# Patient Record
Sex: MALE | Race: WHITE | NOT HISPANIC OR LATINO | Employment: OTHER | URBAN - METROPOLITAN AREA
[De-identification: names, ages, dates, MRNs, and addresses within clinical notes are randomized per-mention and may not be internally consistent; named-entity substitution may affect disease eponyms.]

---

## 2017-04-15 ENCOUNTER — TELEPHONE (OUTPATIENT)
Dept: NURSING | Facility: CLINIC | Age: 82
End: 2017-04-15

## 2017-04-15 NOTE — TELEPHONE ENCOUNTER
Call Type: Triage Call    Presenting Problem: Caller states that he  has arecurrent rash on his  lower legs and arms that been recurring for several years during the  winter but at the present time it is worse than ever appearing on  both calfs and his arms;described as dry itchy scaly and red; seems  to sprread slowly; rash usually goe away in the summertime but this  time he is concerned;  Denies signs of infection or drainage from  rash.  Advised to be seen in clinic in 72 hrs and transferred to  .  Triage Note:  Guideline Title: Rash  Recommended Disposition: See Provider within 72 Hours  Original Inclination:  Override Disposition:  Intended Action:  Physician Contacted: No  Rash lasting more than 2 weeks and not previously evaluated ?  YES  Rash with target-like appearance ? NO  Pregnant AND possible exposure to rubella ? NO  Severe breathing problems ? NO  Breathing problems ? NO  Known or suspected exposure to Poison [place], [place] OR [place] ? NO  Multiple red, fluid-filled lesions AND symptoms of upper respiratory infection  (URI) ? NO  New or worsening signs and symptoms that may indicate shock ? NO  Previously confirmed diagnosis of athlete's foot and similar symptoms ? NO  Reddened, raw skin caused by or made worse by scratching ? NO  Known herpes zoster or undiagnosed blister-like rash on or near eye or on tip of  nose ? NO  Evaluated by provider AND symptoms worsening when following recommended treatment  plan ? NO  New rash/eruptions AND any temperature elevation in an immunocompromised  individual or frail elderly ? NO  Mild to moderate itching AND not responding to home care ? NO  Itching, stinging or burning with small blisters or cracking of skin appearing as  moist lesions between toes; may also involve soles or sides of feet ? NO  Painful cold sore-like blister on tip of finger ? NO  Signs/symptoms of anaphylaxis ? NO  Possible exposure to chickenpox or has rash that looks like chickenpox ?  NO  Burning or tingling feeling, itchiness or stabbing pain in a localized area on one  side of body followed by reddened fluid-filled blisters that break and crust ? NO  New onset of joint swelling or pain ? NO  Pregnant and rash ? NO  Traveled out of country in past 2 months ? NO  Known insect bite or sting ? NO  Small blisters on soles of feet or palm of hands, or along edges of the toes or  fingers causing intense itching ? NO  Skin changes that looks like hives (itchy welts or wheals) ? NO  Severe or persistent itching that interferes with ability to carry out usual  activities or sleep ? NO  Persistence of symptoms (bumps, itching, blisters, ulceration, scaling, etc.)  after completion of prescribed treatment OR not responding to home care ? NO  Itchy, burning, red pinpoint, patchy rash occurring within 48 hours of swimming in  a lake or pond ? NO  Jaundice (yellowish tint to skin or whites of eyes) that is worsening or not  previously evaluated. ? NO  History of severe reaction after previous similar exposure to known allergen ? NO  First occurrence of mild allergic reaction (rash; hives; itching; nasal  congestion; watery, red eyes) that occurs within minutes to several hours after  exposure to an allergen AND without any other signs or symptoms of anaphylaxis. ?  NO  Generalized rash AND not responding to 8 hours of home care ? NO  New onset of rash after beginning new prescribed, nonprescribed, or  alternative/complementary medication within last 10 days ? NO  Unexplained blood-colored (purple or red) flat pinpoint dots, spots or patches on  the skin ? NO  Foot or toe signs or symptoms complicated by diagnosed diabetes mellitus. ? NO  Any new OR worsening signs and symptoms of soft tissue infection ? NO  Physician Instructions:  Care Advice: Do not scratch or rub lesions to decrease the chance of  secondary infection.  See provider within 4 hours if having signs and symptoms of local infection  such as  redness or red streaks, tenderness, warmth, swelling, or purulent  drainage).  Call provider immediately if symptoms worsen before seeing provider.  SYMPTOM / CONDITION MANAGEMENT

## 2017-04-17 ENCOUNTER — OFFICE VISIT (OUTPATIENT)
Dept: FAMILY MEDICINE | Facility: CLINIC | Age: 82
End: 2017-04-17
Payer: COMMERCIAL

## 2017-04-17 VITALS
HEIGHT: 72 IN | TEMPERATURE: 97.7 F | HEART RATE: 55 BPM | WEIGHT: 165.6 LBS | BODY MASS INDEX: 22.43 KG/M2 | OXYGEN SATURATION: 99 % | SYSTOLIC BLOOD PRESSURE: 142 MMHG | DIASTOLIC BLOOD PRESSURE: 70 MMHG

## 2017-04-17 DIAGNOSIS — L20.9 ATOPIC DERMATITIS, UNSPECIFIED TYPE: Primary | ICD-10-CM

## 2017-04-17 PROCEDURE — 99213 OFFICE O/P EST LOW 20 MIN: CPT | Performed by: NURSE PRACTITIONER

## 2017-04-17 RX ORDER — DIAPER,BRIEF,INFANT-TODD,DISP
EACH MISCELLANEOUS 2 TIMES DAILY
COMMUNITY
End: 2017-04-17

## 2017-04-17 RX ORDER — BETAMETHASONE DIPROPIONATE 0.5 MG/G
CREAM TOPICAL
Qty: 50 G | Refills: 2 | Status: SHIPPED | OUTPATIENT
Start: 2017-04-17 | End: 2017-11-09

## 2017-04-17 RX ORDER — TRIAMCINOLONE ACETONIDE 1 MG/G
CREAM TOPICAL
Status: CANCELLED | OUTPATIENT
Start: 2017-04-17

## 2017-04-17 ASSESSMENT — PAIN SCALES - GENERAL: PAINLEVEL: NO PAIN (0)

## 2017-04-17 NOTE — PATIENT INSTRUCTIONS
Try using Dove Sensitive Skin soap.    Make sure your laundry detergent is unscented.    Use over the counter claritin 10 mg daily for itching.    Apply diprolene cream twice daily.  Once dry, use an unscented lotion such as Aveeno, Eucerin, or Lubriderm over the area twice daily.    Schedule an appointment with dermatology if not improving in 2 weeks.      [facility]    If you have any questions regarding to your visit please contact your care team:     Team Pink:   Clinic Hours Telephone Number   Internal Medicine:  Dr. Priyanka Aragon, NP       7am-7pm  Monday - Thursday   7am-5pm  Fridays  [phone]  (Appointment scheduling available 24/7)    Questions about your visit?  Team Line  [phone]   Urgent Care - [place] and [facility] - 11am-9pm Monday-Friday Saturday-Sunday- 9am-5pm   [place] - 5pm-9pm Monday-Friday Saturday-Sunday- 9am-5pm  [phone] - [facility]  [phone] - [place]       What options do I have for visits at the clinic other than the traditional office visit?  To expand how we care for you, many of our providers are utilizing electronic visits (e-visits) and telephone visits, when medically appropriate, for interactions with their patients rather than a visit in the clinic.   We also offer nurse visits for many medical concerns. Just like any other service, we will bill your insurance company for this type of visit based on time spent on the phone with your provider. Not all insurance companies cover these visits. Please check with your medical insurance if this type of visit is covered. You will be responsible for any charges that are not paid by your insurance.      E-visits via [employer]:  generally incur a $35.00 fee.  Telephone visits:  Time spent on the phone: *charged based on time that is spent on the phone in increments of 10 minutes. Estimated cost:   5-10 mins $30.00   11-20 mins. $59.00   21-30 mins. $85.00    Use "[employer] (secure email communication and access to your chart) to send your primary care provider a message or make an appointment. Ask someone on your Team how to sign up for "[employer].    For a Price Quote for your services, please call our Consumer Price Line at [phone].    As always, Thank you for trusting us with your health care needs!    Earnestine Jimenez, CMA

## 2017-04-17 NOTE — PROGRESS NOTES
SUBJECTIVE:                                                    Parish Driscoll is a 89 year old male who presents to clinic today for the following health issues:      Rash     Onset: ongoing for years    Description:   Location: Bilateral arms and legs  Character: round, raised, burning, red  Itching (Pruritis): YES    Progression of Symptoms:  worsening    Accompanying Signs & Symptoms:  Fever: no   Body aches or joint pain: no   Sore throat symptoms: no   Recent cold symptoms: no    History:   Previous similar rash: YES    Precipitating factors:   Exposure to similar rash: no   New exposures: None   Recent travel: no     Alleviating factors:  Creams     Therapies Tried and outcome: Creams- helps somewhat for itching but doesn't clear up the rash.    Patient has tried hydrocortisone, gold bond anti-itch and triamcinolone recently.  He denies any systemic symptoms.  He does admit to frequent pruritis and itches rash frequently.    Problem list and histories reviewed & adjusted, as indicated.  Additional history: as documented    Patient Active Problem List   Diagnosis     Hypothyroidism     Advanced directives, counseling/discussion     Hypertension goal BP (blood pressure) < 140/90     Hyperlipidemia with target LDL less than 130     Prostate cancer (H)     Hyponatremia     Edema     Right thigh pain     Inflammation of joint of right knee     Past Surgical History:   Procedure Laterality Date     CYSTECTOMY BRANCHIAL CLEFT  11/2015     CYSTOSCOPY  06/2003     TONSILLECTOMY      at age 12       Social History   Substance Use Topics     Smoking status: Former Smoker     Years: 15.00     Types: Cigarettes     Quit date: 1/1/1960     Smokeless tobacco: Never Used     Alcohol use No     Family History   Problem Relation Age of Onset     Hypertension Mother      Hypertension Father      Depression Father      Asthma Sister      Depression Sister      DIABETES Maternal Grandmother      CANCER Maternal Grandfather       Stomach     HEART DISEASE No family hx of          Current Outpatient Prescriptions   Medication Sig Dispense Refill     augmented betamethasone dipropionate (DIPROLENE-AF) 0.05 % cream Apply sparingly to affected area twice daily as needed.  Do not apply to face. 50 g 2     lisinopril-hydrochlorothiazide (PRINZIDE,ZESTORETIC) 20-25 MG per tablet TAKE 1 TABLET EVERY DAY 90 tablet 3     levothyroxine (SYNTHROID, LEVOTHROID) 88 MCG tablet TAKE 1 TABLET EVERY DAY 90 tablet 3     metoprolol (LOPRESSOR) 50 MG tablet Take 1 tablet (50 mg) by mouth 2 times daily 180 tablet 3     amLODIPine (NORVASC) 5 MG tablet TAKE 1 TABLET EVERY DAY 90 tablet 3     lisinopril (PRINIVIL,ZESTRIL) 20 MG tablet TAKE 1 TABLET EVERY DAY 90 tablet 3     aspirin 325 MG EC tablet Take 162.5 mg by mouth daily.       Allergies   Allergen Reactions     Mold      Nkda [No Known Drug Allergies]      Seasonal Allergies Other (See Comments)     Runny nose     Recent Labs   Lab Test  11/23/16   1130  11/07/16   1528  08/20/15   1026  02/04/15   1108   09/04/13   1026  09/04/12   1021  06/27/11   1248   05/28/10   1022   LDL   --    --    --   99   --    --   123   --    --   143*   HDL   --    --    --   74   --    --   63   --    --   52   TRIG   --    --    --   88   --    --   63   --    --   87   ALT   --    --    --    --    --   18   --   16   --    --    CR  1.21  1.15  1.05  1.04   < >  0.98  1.02  1.09   --   1.04   GFRESTIMATED  56*  60*  67  67   < >  73  69  64   --   68   GFRESTBLACK  68  72  81  81   < >  88  84  78   --   83   POTASSIUM  4.1  4.4  4.5  4.5   < >  5.0  4.8  4.7   --   4.7   TSH   --   2.09  2.74  3.11   --   2.97   --   0.56   < >   --     < > = values in this interval not displayed.      BP Readings from Last 3 Encounters:   04/17/17 142/70   11/07/16 138/70   11/11/15 141/71    Wt Readings from Last 3 Encounters:   04/17/17 165 lb 9.6 oz (75.1 kg)   11/07/16 164 lb 12.8 oz (74.8 kg)   11/11/15 158 lb (71.7 kg)     "              Labs reviewed in EPIC    Reviewed and updated as needed this visit by clinical staff  Tobacco  Allergies  Meds  Med Hx  Surg Hx  Fam Hx  Soc Hx      Reviewed and updated as needed this visit by Provider         ROS:  Constitutional, HEENT, cardiovascular, pulmonary, gi and gu systems are negative, except as otherwise noted.    OBJECTIVE:                                                    /70  Pulse 55  Temp 97.7  F (36.5  C) (Oral)  Ht 5' 11.5\" (1.816 m)  Wt 165 lb 9.6 oz (75.1 kg)  SpO2 99%  BMI 22.77 kg/m2  Body mass index is 22.77 kg/(m^2).  GENERAL: healthy, alert and no distress  RESP: lungs clear to auscultation - no rales, rhonchi or wheezes  CV: regular rate and rhythm, normal S1 S2, no S3 or S4, no murmur, click or rub, no peripheral edema and peripheral pulses strong  MS: no gross musculoskeletal defects noted, no edema  SKIN: erythematous macular papular lesions to bilateral forearms, bilateral calves with some overlying scale.    Diagnostic Test Results:  none      ASSESSMENT/PLAN:                                                        1. Atopic dermatitis, unspecified type  See patient instructions below.    - augmented betamethasone dipropionate (DIPROLENE-AF) 0.05 % cream; Apply sparingly to affected area twice daily as needed.  Do not apply to face.  Dispense: 50 g; Refill: 2  - DERMATOLOGY REFERRAL    Patient Instructions     Try using Dove Sensitive Skin soap.    Make sure your laundry detergent is unscented.    Use over the counter claritin 10 mg daily for itching.    Apply diprolene cream twice daily.  Once dry, use an unscented lotion such as Aveeno, Eucerin, or Lubriderm over the area twice daily.    Schedule an appointment with dermatology if not improving in 2 weeks.      [facility]    If you have any questions regarding to your visit please contact your care team:     Team Pink:   Clinic Hours Telephone Number   Internal Medicine:  Dr. Mathew " Bert Aragon, NP       7am-7pm  Monday - Thursday   7am-5pm  Fridays  [phone]  (Appointment scheduling available 24/7)    Questions about your visit?  Team Line  [phone]   Urgent Care - [place] and [facility] - 11am-9pm Monday-Friday Saturday-Sunday- 9am-5pm   [place] - 5pm-9pm Monday-Friday Saturday-Sunday- 9am-5pm  [phone] - [place]   [phone] - [place]       What options do I have for visits at the clinic other than the traditional office visit?  To expand how we care for you, many of our providers are utilizing electronic visits (e-visits) and telephone visits, when medically appropriate, for interactions with their patients rather than a visit in the clinic.   We also offer nurse visits for many medical concerns. Just like any other service, we will bill your insurance company for this type of visit based on time spent on the phone with your provider. Not all insurance companies cover these visits. Please check with your medical insurance if this type of visit is covered. You will be responsible for any charges that are not paid by your insurance.      E-visits via [employer]:  generally incur a $35.00 fee.  Telephone visits:  Time spent on the phone: *charged based on time that is spent on the phone in increments of 10 minutes. Estimated cost:   5-10 mins $30.00   11-20 mins. $59.00   21-30 mins. $85.00   Use [employer] (secure email communication and access to your chart) to send your primary care provider a message or make an appointment. Ask someone on your Team how to sign up for [employer]    For a Price Quote for your services, please call our Consumer Price Line at [phone].    As always, Thank you for trusting us with your health care needs!    Earnestine Jimenez, FANNY Aragon, APRN [facility]

## 2017-04-17 NOTE — MR AVS SNAPSHOT
After Visit Summary   4/17/2017    Parish Driscoll    MRN: [id number]           Patient Information     Date Of Birth          10/10/1926        Visit Information        Provider Department      4/17/2017 11:00 AM Inés Aragon APRN [facility]        Today's Diagnoses     Atopic dermatitis, unspecified type    -  1      Care Instructions    Try using Dove Sensitive Skin soap.    Make sure your laundry detergent is unscented.    Use over the counter claritin 10 mg daily for itching.    Apply diprolene cream twice daily.  Once dry, use an unscented lotion such as Aveeno, Eucerin, or Lubriderm over the area twice daily.    Schedule an appointment with dermatology if not improving in 2 weeks.      [facility]    If you have any questions regarding to your visit please contact your care team:     Team Pink:   Clinic Hours Telephone Number   Internal Medicine:  Dr. Priyanka Aragon, NP       7am-7pm  Monday - Thursday   7am-5pm  Fridays  [phone]  (Appointment scheduling available 24/7)    Questions about your visit?  Team Line  [phone]   Urgent Care - [place] and [place] - 11am-9pm Monday-Friday Saturday-Sunday- 9am-5pm   [place] - 5pm-9pm Monday-Friday Saturday-Sunday- 9am-5pm  [phone] - [place]   [phone] - [place]       What options do I have for visits at the clinic other than the traditional office visit?  To expand how we care for you, many of our providers are utilizing electronic visits (e-visits) and telephone visits, when medically appropriate, for interactions with their patients rather than a visit in the clinic.   We also offer nurse visits for many medical concerns. Just like any other service, we will bill your insurance company for this type of visit based on time spent on the phone with your provider. Not all insurance companies cover these visits. Please check with  your medical insurance if this type of visit is covered. You will be responsible for any charges that are not paid by your insurance.      E-visits via [employer]:  generally incur a $35.00 fee.  Telephone visits:  Time spent on the phone: *charged based on time that is spent on the phone in increments of 10 minutes. Estimated cost:   5-10 mins $30.00   11-20 mins. $59.00   21-30 mins. $85.00   Use [employer] (secure email communication and access to your chart) to send your primary care provider a message or make an appointment. Ask someone on your Team how to sign up for [employer].    For a Price Quote for your services, please call our [employer] Line at [phone].    As always, Thank you for trusting us with your health care needs!    Earnestine Jimenez, FANNY          Follow-ups after your visit        Additional Services     DERMATOLOGY REFERRAL       Your provider has referred you to: [facility] [place] (2 locations) [phone]   [email or web address]    Please be aware that coverage of these services is subject to the terms and limitations of your health insurance plan.  Call member services at your health plan with any benefit or coverage questions.      Please bring the following with you to your appointment:    (1) Any X-Rays, CTs or MRIs which have been performed.  Contact the facility where they were done to arrange for  prior to your scheduled appointment.    (2) List of current medications  (3) This referral request   (4) Any documents/labs given to you for this referral                  Who to contact     If you have questions or need follow up information about today's clinic visit or your schedule please contact [place] [facility] directly at [phone].  Normal or non-critical lab and imaging results will be communicated to you by MyChart, letter or phone within 4 business days after the clinic has received the results. If you do not hear from us  "within 7 days, please contact the clinic through Section 101 or phone. If you have a critical or abnormal lab result, we will notify you by phone as soon as possible.  Submit refill requests through Section 101 or call your pharmacy and they will forward the refill request to us. Please allow 3 business days for your refill to be completed.          Additional Information About Your Visit        Section 101 Information     Section 101 lets you send messages to your doctor, view your test results, renew your prescriptions, schedule appointments and more. To sign up, go to [email or web address] . Click on \"Log in\" on the left side of the screen, which will take you to the Welcome page. Then click on \"Sign up Now\" on the right side of the page.     You will be asked to enter the access code listed below, as well as some personal information. Please follow the directions to create your username and password.     Your access code is: [email or web address]  Expires: 2017 11:35 AM     Your access code will  in 90 days. If you need help or a new code, please call your [place] clinic or [phone].        Care EveryWhere ID     This is your Care EveryWhere ID. This could be used by other organizations to access your [place] medical records  [id number]        Your Vitals Were     Pulse Temperature Height Pulse Oximetry BMI (Body Mass Index)       55 97.7  F (36.5  C) (Oral) 5' 11.5\" (1.816 m) 99% 22.77 kg/m2        Blood Pressure from Last 3 Encounters:   17 142/70   16 138/70   11/11/15 141/71    Weight from Last 3 Encounters:   17 165 lb 9.6 oz (75.1 kg)   16 164 lb 12.8 oz (74.8 kg)   11/11/15 158 lb (71.7 kg)              We Performed the Following     DERMATOLOGY REFERRAL          Today's Medication Changes          These changes are accurate as of: 17 11:35 AM.  If you have any questions, ask your nurse or doctor.               Start taking these medicines.        Dose/Directions    augmented " betamethasone dipropionate 0.05 % cream   Commonly known as:  DIPROLENE-AF   Used for:  Atopic dermatitis, unspecified type   Started by:  Inés Aragon APRN CNP        Apply sparingly to affected area twice daily as needed.  Do not apply to face.   Quantity:  50 g   Refills:  2            Where to get your medicines      These medications were sent to [id number] - [place], [place] - [address]  [address] [address]     Phone:  [phone]     augmented betamethasone dipropionate 0.05 % cream                Primary Care Provider Office Phone # Fax #    Priyanka Noel [phone]       [address] [address]        Thank you!     Thank you for choosing [facility]  for your care. Our goal is always to provide you with excellent care. Hearing back from our patients is one way we can continue to improve our services. Please take a few minutes to complete the written survey that you may receive in the mail after your visit with us. Thank you!             Your Updated Medication List - Protect others around you: Learn how to safely use, store and throw away your medicines at www.disposemymeds.org.          This list is accurate as of: 4/17/17 11:35 AM.  Always use your most recent med list.                   Brand Name Dispense Instructions for use    amLODIPine 5 MG tablet    NORVASC    90 tablet    TAKE 1 TABLET EVERY DAY       aspirin 325 MG EC tablet      Take 162.5 mg by mouth daily.       augmented betamethasone dipropionate 0.05 % cream    DIPROLENE-AF    50 g    Apply sparingly to affected area twice daily as needed.  Do not apply to face.       levothyroxine 88 MCG tablet    SYNTHROID/LEVOTHROID    90 tablet    TAKE 1 TABLET EVERY DAY       lisinopril 20 MG tablet    PRINIVIL/ZESTRIL    90 tablet    TAKE 1 TABLET EVERY DAY       lisinopril-hydrochlorothiazide 20-25 MG per tablet     PRINZIDE/ZESTORETIC    90 tablet    TAKE 1 TABLET EVERY DAY       metoprolol 50 MG tablet    LOPRESSOR    180 tablet    Take 1 tablet (50 mg) by mouth 2 times daily

## 2017-04-18 ENCOUNTER — TELEPHONE (OUTPATIENT)
Dept: FAMILY MEDICINE | Facility: CLINIC | Age: 82
End: 2017-04-18

## 2017-04-18 NOTE — TELEPHONE ENCOUNTER
Patient requesting prescription Diprolene for rash.    Please advise.    Thank you.    Candice FONSECA

## 2017-06-28 ENCOUNTER — TRANSFERRED RECORDS (OUTPATIENT)
Dept: HEALTH INFORMATION MANAGEMENT | Facility: CLINIC | Age: 82
End: 2017-06-28

## 2017-08-11 DIAGNOSIS — E03.9 HYPOTHYROIDISM, UNSPECIFIED TYPE: ICD-10-CM

## 2017-08-11 NOTE — TELEPHONE ENCOUNTER
levothyroxine (SYNTHROID, LEVOTHROID) 88 MCG tablet    Last Written Prescription Date: 11/07/2016  Last Quantity: 90, # refills: 3  Last Office Visit with [facility], [facility] or [facility] prescribing provider: 04/17/2017        TSH   Date Value Ref Range Status   11/07/2016 2.09 0.40 - 4.00 mU/L Final     Loren Diallo MA

## 2017-08-14 RX ORDER — LEVOTHYROXINE SODIUM 88 UG/1
TABLET ORAL
Qty: 90 TABLET | Refills: 0 | Status: SHIPPED | OUTPATIENT
Start: 2017-08-14 | End: 2017-10-16

## 2017-08-21 DIAGNOSIS — I10 ESSENTIAL HYPERTENSION WITH GOAL BLOOD PRESSURE LESS THAN 140/90: ICD-10-CM

## 2017-08-22 NOTE — TELEPHONE ENCOUNTER
Metoprolol      Last Written Prescription Date: 11/7/16  Last Fill Quantity: 180, # refills: 3    Last [place] Visit with [facility] [facility] or  Health prescribing provider:  4/7/17   Future Office Visit:        BP Readings from Last 3 Encounters:   04/17/17 142/70   11/07/16 138/70   11/11/15 141/71     Amlodipine      Last Written Prescription Date: 11/7/16  Last Fill Quantity: 90, # refills: 3    Last Office Visit [place] [facility] [place] [facility] [facility] or  Health prescribing provider:  4/7/17   Future Office Visit:        BP Readings from Last 3 Encounters:   04/17/17 142/70   11/07/16 138/70   11/11/15 141/71     Lisinopril/HCTZ      Last Written Prescription Date: 11/7/16  Last Fill Quantity: 90, # refills: 3  Last Office Visit [place] [facility] [place] [facility] or [facility] prescribing provider: 4/7/17       Potassium   Date Value Ref Range Status   11/23/2016 4.1 3.4 - 5.3 mmol/L Final     Creatinine   Date Value Ref Range Status   11/23/2016 1.21 0.66 - 1.25 mg/dL Final     BP Readings from Last 3 Encounters:   04/17/17 142/70   11/07/16 138/70   11/11/15 141/71     Lisinopril      Last Written Prescription Date: 11/7/16  Last Fill Quantity: 60, # refills: 3  Last Office Visit with [facility] [facility] or  Health prescribing provider: 4/7/17       Potassium   Date Value Ref Range Status   11/23/2016 4.1 3.4 - 5.3 mmol/L Final     Creatinine   Date Value Ref Range Status   11/23/2016 1.21 0.66 - 1.25 mg/dL Final     BP Readings from Last 3 Encounters:   04/17/17 142/70   11/07/16 138/70   11/11/15 141/71

## 2017-08-23 RX ORDER — LISINOPRIL AND HYDROCHLOROTHIAZIDE 20; 25 MG/1; MG/1
TABLET ORAL
Qty: 90 TABLET | Refills: 0 | Status: SHIPPED | OUTPATIENT
Start: 2017-08-23 | End: 2017-11-02

## 2017-08-23 RX ORDER — AMLODIPINE BESYLATE 5 MG/1
TABLET ORAL
Qty: 90 TABLET | Refills: 0 | Status: SHIPPED | OUTPATIENT
Start: 2017-08-23 | End: 2017-11-02

## 2017-08-23 RX ORDER — METOPROLOL TARTRATE 50 MG
TABLET ORAL
Qty: 180 TABLET | Refills: 0 | Status: SHIPPED | OUTPATIENT
Start: 2017-08-23 | End: 2017-11-02

## 2017-08-23 RX ORDER — LISINOPRIL 20 MG/1
TABLET ORAL
Qty: 90 TABLET | Refills: 0 | Status: SHIPPED | OUTPATIENT
Start: 2017-08-23 | End: 2017-11-02

## 2017-08-23 NOTE — TELEPHONE ENCOUNTER
Medication is being filled for 1 time refill only due to:  Patient needs to be seen because last BP reading not within goal .     Signed Prescriptions:                        Disp   Refills    metoprolol (LOPRESSOR) 50 MG tablet        180 ta*0        Sig: TAKE 1 TABLET TWICE DAILY  Authorizing Provider: SIMEON JOSEPH  Ordering User: LEX HILL    amLODIPine (NORVASC) 5 MG tablet           90 tab*0        Sig: TAKE 1 TABLET EVERY DAY  Authorizing Provider: SIMEON JOSEPH  Ordering User: LEX HILL    lisinopril (PRINIVIL/ZESTRIL) 20 MG tablet 90 tab*0        Sig: TAKE 1 TABLET EVERY DAY  Authorizing Provider: SIMEON JOSEPH  Ordering User: LEX HILL    lisinopril-hydrochlorothiazide (PRINZIDE/Z*90 tab*0        Sig: TAKE 1 TABLET EVERY DAY  Authorizing Provider: SIMEON JOSEPH  Ordering User: LEX HILL RN, BSN

## 2017-10-16 DIAGNOSIS — E03.9 HYPOTHYROIDISM, UNSPECIFIED TYPE: ICD-10-CM

## 2017-10-16 RX ORDER — LEVOTHYROXINE SODIUM 88 UG/1
TABLET ORAL
Qty: 30 TABLET | Refills: 0 | Status: SHIPPED | OUTPATIENT
Start: 2017-10-16 | End: 2017-11-09

## 2017-10-16 NOTE — TELEPHONE ENCOUNTER
Medication is being filled for 1 time refill only due to:  Patient needs labs TSH.     Signed Prescriptions:                        Disp   Refills    levothyroxine (SYNTHROID/LEVOTHROID) 88 MC*30 tab*0        Sig: TAKE 1 TABLET EVERY DAY  Authorizing Provider: SIMEON JOSEPH  Ordering User: YNES SHIPMAN RN

## 2017-10-16 NOTE — TELEPHONE ENCOUNTER
levothyroxine (SYNTHROID/LEVOTHROID) 88 MCG tablet   Last Written Prescription Date: 08/14/2017  Last Quantity: 90, # refills: 0  Last Office Visit with [facility], [facility] or [facility] prescribing provider: 04/17/2017        TSH   Date Value Ref Range Status   11/07/2016 2.09 0.40 - 4.00 mU/L Final     Loren Diallo MA

## 2017-11-02 DIAGNOSIS — I10 ESSENTIAL HYPERTENSION WITH GOAL BLOOD PRESSURE LESS THAN 140/90: ICD-10-CM

## 2017-11-03 NOTE — TELEPHONE ENCOUNTER
Routing refill request to provider for review/approval because:  Dana given x1 and patient did not follow up, please advise  Elevated BP      Samantha Odom RN - BC

## 2017-11-06 NOTE — TELEPHONE ENCOUNTER
Okay for one 30 day refill once follow-up is scheduled for hypertension and annual blood work.    Inés Aragon, CNP

## 2017-11-08 RX ORDER — AMLODIPINE BESYLATE 5 MG/1
TABLET ORAL
Qty: 30 TABLET | Refills: 0 | Status: SHIPPED | OUTPATIENT
Start: 2017-11-08 | End: 2017-11-09

## 2017-11-08 RX ORDER — METOPROLOL TARTRATE 50 MG
TABLET ORAL
Qty: 60 TABLET | Refills: 0 | Status: SHIPPED | OUTPATIENT
Start: 2017-11-08 | End: 2017-11-09

## 2017-11-08 RX ORDER — LISINOPRIL 20 MG/1
TABLET ORAL
Qty: 30 TABLET | Refills: 0 | Status: SHIPPED | OUTPATIENT
Start: 2017-11-08 | End: 2017-11-09

## 2017-11-08 RX ORDER — LISINOPRIL AND HYDROCHLOROTHIAZIDE 20; 25 MG/1; MG/1
TABLET ORAL
Qty: 30 TABLET | Refills: 0 | Status: SHIPPED | OUTPATIENT
Start: 2017-11-08 | End: 2017-11-09

## 2017-11-08 NOTE — TELEPHONE ENCOUNTER
Signed Prescriptions:                        Disp   Refills    lisinopril (PRINIVIL/ZESTRIL) 20 MG tablet 30 tab*0        Sig: TAKE 1 TABLET EVERY DAY  (NEED FOLLOW UP IN CLINIC           BEFORE ANY FURTHER REFILLS)  Authorizing Provider: SIMEON JOSEPH User: DANIELLE FRAZIER    lisinopril-hydrochlorothiazide (PRINZIDE/Z*30 tab*0        Sig: TAKE 1 TABLET EVERY DAY  (NEED FOLLOW UP IN CLINIC           BEFORE ANY FURTHER REFILLS)  Authorizing Provider: SIMEON JOSEPH User: DANIELLE FRAZIER    metoprolol (LOPRESSOR) 50 MG tablet        60 tab*0        Sig: TAKE 1 TABLET TWICE DAILY  (NEED FOLLOW UP IN CLINIC           BEFORE ANY FURTHER REFILLS)  Authorizing Provider: SIMEON JOSEPH User: DANIELLE FRAZIER    amLODIPine (NORVASC) 5 MG tablet           30 tab*0        Sig: TAKE 1 TABLET EVERY DAY (NEED FOLLOW UP IN CLINIC           BEFORE ANY FURTHER REFILLS)  Authorizing Provider: SIMEON JOSEPH User: DANIELLE FRAZIER    Refilled per provider written order. Danielle Frazier RN ............   11/8/2017...1:03 PM

## 2017-11-08 NOTE — TELEPHONE ENCOUNTER
Informed patient of below message. Patient has appointment 11/9/2017.  Glenda LAU CMA ([facility])

## 2017-11-08 NOTE — PROGRESS NOTES
SUBJECTIVE:   Parish Driscoll is a 89 year old male who presents to clinic today for the following health issues:      Hypertension Follow-up      Outpatient blood pressures are not being checked.    Low Salt Diet: low salt        Amount of exercise or physical activity: None    Problems taking medications regularly: No    Medication side effects: none    Diet: regular (no restrictions)    Patient denies chest pain, palpitations.  He notes some dizziness on occasion with standing too quickly.    Patient denies any change in urinary symptoms or urinary retention.  He continues to have nocturia, but it is stable.    Patient notes some difficulty falling asleep at night.  He feels he can't shut his mind off.  He denies depression or anxiety.  He wonders what he can do.  He does not nap during the day.    Problem list and histories reviewed & adjusted, as indicated.  Additional history: as documented    Patient Active Problem List   Diagnosis     Hypothyroidism     Advanced directives, counseling/discussion     Hypertension goal BP (blood pressure) < 140/90     Hyperlipidemia with target LDL less than 130     Prostate cancer (H)     Hyponatremia     Edema     Right thigh pain     Inflammation of joint of right knee     Past Surgical History:   Procedure Laterality Date     CYSTECTOMY BRANCHIAL CLEFT  11/2015     CYSTOSCOPY  06/2003     TONSILLECTOMY      at age 12       Social History   Substance Use Topics     Smoking status: Former Smoker     Years: 15.00     Types: Cigarettes     Quit date: 1/1/1960     Smokeless tobacco: Never Used     Alcohol use No     Family History   Problem Relation Age of Onset     Hypertension Mother      Hypertension Father      Depression Father      Asthma Sister      Depression Sister      DIABETES Maternal Grandmother      CANCER Maternal Grandfather      Stomach     HEART DISEASE No family hx of          Current Outpatient Prescriptions   Medication Sig Dispense Refill      lisinopril-hydrochlorothiazide (PRINZIDE/ZESTORETIC) 20-25 MG per tablet TAKE 1 TABLET EVERY DAY 90 tablet 3     metoprolol (LOPRESSOR) 25 MG tablet TAKE 1 TABLET TWICE DAILY 180 tablet 3     amLODIPine (NORVASC) 5 MG tablet TAKE 1 TABLET EVERY DAY 90 tablet 3     levothyroxine (SYNTHROID/LEVOTHROID) 88 MCG tablet TAKE 1 TABLET EVERY DAY 90 tablet 3     augmented betamethasone dipropionate (DIPROLENE-AF) 0.05 % cream Apply sparingly to affected area twice daily as needed.  Do not apply to face. 50 g 2     aspirin 325 MG EC tablet Take 162.5 mg by mouth daily.       [DISCONTINUED] lisinopril-hydrochlorothiazide (PRINZIDE/ZESTORETIC) 20-25 MG per tablet TAKE 1 TABLET EVERY DAY  (NEED FOLLOW UP IN CLINIC BEFORE ANY FURTHER REFILLS) 30 tablet 0     [DISCONTINUED] metoprolol (LOPRESSOR) 50 MG tablet TAKE 1 TABLET TWICE DAILY  (NEED FOLLOW UP IN CLINIC BEFORE ANY FURTHER REFILLS) 60 tablet 0     [DISCONTINUED] amLODIPine (NORVASC) 5 MG tablet TAKE 1 TABLET EVERY DAY (NEED FOLLOW UP IN CLINIC BEFORE ANY FURTHER REFILLS) 30 tablet 0     [DISCONTINUED] levothyroxine (SYNTHROID/LEVOTHROID) 88 MCG tablet TAKE 1 TABLET EVERY DAY 30 tablet 0     Allergies   Allergen Reactions     Mold      Nkda [No Known Drug Allergies]      Seasonal Allergies Other (See Comments)     Runny nose     BP Readings from Last 3 Encounters:   11/09/17 130/60   04/17/17 142/70   11/07/16 138/70    Wt Readings from Last 3 Encounters:   11/09/17 160 lb (72.6 kg)   04/17/17 165 lb 9.6 oz (75.1 kg)   11/07/16 164 lb 12.8 oz (74.8 kg)                  Labs reviewed in EPIC        Reviewed and updated as needed this visit by clinical staff     Reviewed and updated as needed this visit by Provider         ROS:  Constitutional, HEENT, cardiovascular, pulmonary, gi and gu systems are negative, except as otherwise noted.      OBJECTIVE:   /60  Pulse (!) 45  Temp 97.5  F (36.4  C) (Oral)  Wt 160 lb (72.6 kg)  SpO2 99%  BMI 22 kg/m2  Body mass index is  22 kg/(m^2).  GENERAL: healthy, alert and no distress  EYES: Eyes grossly normal to inspection, PERRL and conjunctivae and sclerae normal  HENT: ear canals and TM's normal, nose and mouth without ulcers or lesions  NECK: no adenopathy, no asymmetry, masses, or scars and thyroid normal to palpation  RESP: lungs clear to auscultation - no rales, rhonchi or wheezes  CV: bradycardia, normal S1 S2, no S3 or S4, no murmur, click or rub, peripheral pulses strong and no peripheral edema  MS: no gross musculoskeletal defects noted, no edema    Diagnostic Test Results:  pending    ASSESSMENT/PLAN:     1. Essential hypertension with goal blood pressure less than 140/90  Stable.  Continue current treatment plan and medications.    - lisinopril-hydrochlorothiazide (PRINZIDE/ZESTORETIC) 20-25 MG per tablet; TAKE 1 TABLET EVERY DAY  Dispense: 90 tablet; Refill: 3  - metoprolol (LOPRESSOR) 25 MG tablet; TAKE 1 TABLET TWICE DAILY  Dispense: 180 tablet; Refill: 3  - amLODIPine (NORVASC) 5 MG tablet; TAKE 1 TABLET EVERY DAY  Dispense: 90 tablet; Refill: 3  - Basic metabolic panel  - Hemoglobin    2. Hypothyroidism, unspecified type  Stable.  Continue current treatment plan and medications.    - TSH WITH FREE T4 REFLEX  - levothyroxine (SYNTHROID/LEVOTHROID) 88 MCG tablet; TAKE 1 TABLET EVERY DAY  Dispense: 90 tablet; Refill: 3    3. Prostate cancer (H)  No new or worsening symptoms.  - PSA, tumor marker    4. Bradycardia  Decrease metoprolol as above.  Patient to follow-up for ancillary blood pressure check and pulse check in 2 weeks.    5. Atopic dermatitis, unspecified type    - augmented betamethasone dipropionate (DIPROLENE-AF) 0.05 % cream; Apply sparingly to affected area twice daily as needed.  Do not apply to face.  Dispense: 50 g; Refill: 2    6. At risk for falling      7. Persistent disorder of initiating or maintaining sleep  Patient to try over the counter melatonin.        FUTURE APPOINTMENTS:       - Follow-up for  annual visit or as needed    CHELI Hardwick CNP  [facility]

## 2017-11-09 ENCOUNTER — OFFICE VISIT (OUTPATIENT)
Dept: FAMILY MEDICINE | Facility: CLINIC | Age: 82
End: 2017-11-09
Payer: COMMERCIAL

## 2017-11-09 VITALS
HEART RATE: 45 BPM | WEIGHT: 160 LBS | SYSTOLIC BLOOD PRESSURE: 130 MMHG | TEMPERATURE: 97.5 F | DIASTOLIC BLOOD PRESSURE: 60 MMHG | BODY MASS INDEX: 22 KG/M2 | OXYGEN SATURATION: 99 %

## 2017-11-09 DIAGNOSIS — E03.9 HYPOTHYROIDISM, UNSPECIFIED TYPE: ICD-10-CM

## 2017-11-09 DIAGNOSIS — Z91.81 AT RISK FOR FALLING: ICD-10-CM

## 2017-11-09 DIAGNOSIS — L20.9 ATOPIC DERMATITIS, UNSPECIFIED TYPE: ICD-10-CM

## 2017-11-09 DIAGNOSIS — G47.00 PERSISTENT DISORDER OF INITIATING OR MAINTAINING SLEEP: ICD-10-CM

## 2017-11-09 DIAGNOSIS — E87.1 HYPONATREMIA: ICD-10-CM

## 2017-11-09 DIAGNOSIS — C61 PROSTATE CANCER (H): ICD-10-CM

## 2017-11-09 DIAGNOSIS — I10 ESSENTIAL HYPERTENSION WITH GOAL BLOOD PRESSURE LESS THAN 140/90: Primary | ICD-10-CM

## 2017-11-09 DIAGNOSIS — R00.1 BRADYCARDIA: ICD-10-CM

## 2017-11-09 LAB
ANION GAP SERPL CALCULATED.3IONS-SCNC: 9 MMOL/L (ref 3–14)
BUN SERPL-MCNC: 29 MG/DL (ref 7–30)
CALCIUM SERPL-MCNC: 9.2 MG/DL (ref 8.5–10.1)
CHLORIDE SERPL-SCNC: 92 MMOL/L (ref 94–109)
CO2 SERPL-SCNC: 29 MMOL/L (ref 20–32)
CREAT SERPL-MCNC: 1.26 MG/DL (ref 0.66–1.25)
GFR SERPL CREATININE-BSD FRML MDRD: 54 ML/MIN/1.7M2
GLUCOSE SERPL-MCNC: 88 MG/DL (ref 70–99)
HGB BLD-MCNC: 12.5 G/DL (ref 13.3–17.7)
POTASSIUM SERPL-SCNC: 4.2 MMOL/L (ref 3.4–5.3)
PSA SERPL-MCNC: 1.85 UG/L (ref 0–4)
SODIUM SERPL-SCNC: 130 MMOL/L (ref 133–144)
TSH SERPL DL<=0.005 MIU/L-ACNC: 3.72 MU/L (ref 0.4–4)

## 2017-11-09 PROCEDURE — 84443 ASSAY THYROID STIM HORMONE: CPT | Performed by: NURSE PRACTITIONER

## 2017-11-09 PROCEDURE — 99214 OFFICE O/P EST MOD 30 MIN: CPT | Performed by: NURSE PRACTITIONER

## 2017-11-09 PROCEDURE — 84153 ASSAY OF PSA TOTAL: CPT | Performed by: NURSE PRACTITIONER

## 2017-11-09 PROCEDURE — 36415 COLL VENOUS BLD VENIPUNCTURE: CPT | Performed by: NURSE PRACTITIONER

## 2017-11-09 PROCEDURE — 85018 HEMOGLOBIN: CPT | Performed by: NURSE PRACTITIONER

## 2017-11-09 PROCEDURE — 80048 BASIC METABOLIC PNL TOTAL CA: CPT | Performed by: NURSE PRACTITIONER

## 2017-11-09 RX ORDER — LEVOTHYROXINE SODIUM 88 UG/1
TABLET ORAL
Qty: 90 TABLET | Refills: 3 | Status: SHIPPED | OUTPATIENT
Start: 2017-11-09 | End: 2018-08-27

## 2017-11-09 RX ORDER — METOPROLOL TARTRATE 25 MG/1
TABLET, FILM COATED ORAL
Qty: 180 TABLET | Refills: 3 | Status: SHIPPED | OUTPATIENT
Start: 2017-11-09 | End: 2018-11-30

## 2017-11-09 RX ORDER — LISINOPRIL AND HYDROCHLOROTHIAZIDE 20; 25 MG/1; MG/1
TABLET ORAL
Qty: 90 TABLET | Refills: 3 | Status: SHIPPED | OUTPATIENT
Start: 2017-11-09 | End: 2017-11-13

## 2017-11-09 RX ORDER — AMLODIPINE BESYLATE 5 MG/1
TABLET ORAL
Qty: 90 TABLET | Refills: 3 | Status: SHIPPED | OUTPATIENT
Start: 2017-11-09 | End: 2018-08-27

## 2017-11-09 RX ORDER — BETAMETHASONE DIPROPIONATE 0.5 MG/G
CREAM TOPICAL
Qty: 50 G | Refills: 2 | Status: CANCELLED | OUTPATIENT
Start: 2017-11-09

## 2017-11-09 RX ORDER — BETAMETHASONE DIPROPIONATE 0.5 MG/G
CREAM TOPICAL
Qty: 50 G | Refills: 2 | Status: SHIPPED | OUTPATIENT
Start: 2017-11-09 | End: 2018-12-05

## 2017-11-09 ASSESSMENT — PATIENT HEALTH QUESTIONNAIRE - PHQ9
5. POOR APPETITE OR OVEREATING: NOT AT ALL
SUM OF ALL RESPONSES TO PHQ QUESTIONS 1-9: 4

## 2017-11-09 ASSESSMENT — ANXIETY QUESTIONNAIRES
7. FEELING AFRAID AS IF SOMETHING AWFUL MIGHT HAPPEN: NOT AT ALL
6. BECOMING EASILY ANNOYED OR IRRITABLE: NOT AT ALL
IF YOU CHECKED OFF ANY PROBLEMS ON THIS QUESTIONNAIRE, HOW DIFFICULT HAVE THESE PROBLEMS MADE IT FOR YOU TO DO YOUR WORK, TAKE CARE OF THINGS AT HOME, OR GET ALONG WITH OTHER PEOPLE: NOT DIFFICULT AT ALL
1. FEELING NERVOUS, ANXIOUS, OR ON EDGE: NOT AT ALL
5. BEING SO RESTLESS THAT IT IS HARD TO SIT STILL: NOT AT ALL
2. NOT BEING ABLE TO STOP OR CONTROL WORRYING: NOT AT ALL
3. WORRYING TOO MUCH ABOUT DIFFERENT THINGS: NOT AT ALL
GAD7 TOTAL SCORE: 0

## 2017-11-09 ASSESSMENT — PAIN SCALES - GENERAL: PAINLEVEL: NO PAIN (0)

## 2017-11-09 NOTE — NURSING NOTE
"Chief Complaint   Patient presents with     Hypertension     BP check and labs. Due for PHQ2     Flu Shot       Initial /60  Pulse (!) 45  Temp 97.5  F (36.4  C) (Oral)  Wt 160 lb (72.6 kg)  SpO2 99%  BMI 22 kg/m2 Estimated body mass index is 22 kg/(m^2) as calculated from the following:    Height as of 4/17/17: 5' 11.5\" (1.816 m).    Weight as of this encounter: 160 lb (72.6 kg).  Medication Reconciliation: complete   Caridad Emerson MA    "

## 2017-11-09 NOTE — PATIENT INSTRUCTIONS
Decrease metoprolol to 25 mg twice daily (1/2 50 mg pill).  Follow-up in 1-2 weeks for ancillary blood pressure and pulse check.  Consider trying melatonin 3 mg over the counter 2-3 hours before bedtime to help with sleep.    [facility]    If you have any questions regarding to your visit please contact your care team:     Team Pink:   Clinic Hours Telephone Number   Internal Medicine:  Dr. Priyanka Aragon, NP       7am-7pm  Monday - Thursday   7am-5pm  Fridays  [phone]  (Appointment scheduling available 24/7)    Questions about your visit?  Team Line  [phone]   Urgent Care - [place] and [place] - 11am-9pm Monday-Friday Saturday-Sunday- 9am-5pm   [place] - 5pm-9pm Monday-Friday Saturday-Sunday- 9am-5pm  [phone] - [place]   [phone] - [place]       What options do I have for visits at the clinic other than the traditional office visit?  To expand how we care for you, many of our providers are utilizing electronic visits (e-visits) and telephone visits, when medically appropriate, for interactions with their patients rather than a visit in the clinic.   We also offer nurse visits for many medical concerns. Just like any other service, we will bill your insurance company for this type of visit based on time spent on the phone with your provider. Not all insurance companies cover these visits. Please check with your medical insurance if this type of visit is covered. You will be responsible for any charges that are not paid by your insurance.      E-visits via [employer]:  generally incur a $35.00 fee.  Telephone visits:  Time spent on the phone: *charged based on time that is spent on the phone in increments of 10 minutes. Estimated cost:   5-10 mins $30.00   11-20 mins. $59.00   21-30 mins. $85.00   Use [employer] (secure email communication and access to your chart) to send your primary care provider a message or make an  appointment. Ask someone on your Team how to sign up for [employer].    For a Price Quote for your services, please call our Consumer Price Line at [phone].    As always, Thank you for trusting us with your health care needs!    Discharged By:  CHOCO SANCHEZ

## 2017-11-09 NOTE — MR AVS SNAPSHOT
After Visit Summary   11/9/2017    Parish Driscoll    MRN: [id number]           Patient Information     Date Of Birth          10/10/1926        Visit Information        Provider Department      11/9/2017 10:20 AM Inés Aragon APRN [facility]        Today's Diagnoses     Essential hypertension with goal blood pressure less than 140/90    -  1    Hypothyroidism, unspecified type        Prostate cancer (H)        Bradycardia        Atopic dermatitis, unspecified type        At risk for falling        Need for prophylactic vaccination and inoculation against influenza          Care Instructions    Decrease metoprolol to 25 mg twice daily (1/2 50 mg pill).  Follow-up in 1-2 weeks for ancillary blood pressure and pulse check.  Consider trying melatonin 3 mg over the counter 2-3 hours before bedtime to help with sleep.    [facility]    If you have any questions regarding to your visit please contact your care team:     Team Pink:   Clinic Hours Telephone Number   Internal Medicine:  Dr. Priyanka Aragon, NP       7am-7pm  Monday - Thursday   7am-5pm  Fridays  [phone]  (Appointment scheduling available 24/7)    Questions about your visit?  Team Line  [phone]   Urgent Care - [place] and [place] - 11am-9pm Monday-Friday Saturday-Sunday- 9am-5pm   [place] - 5pm-9pm Monday-Friday Saturday-Sunday- 9am-5pm  [phone] - [place]   [phone] - [place]       What options do I have for visits at the clinic other than the traditional office visit?  To expand how we care for you, many of our providers are utilizing electronic visits (e-visits) and telephone visits, when medically appropriate, for interactions with their patients rather than a visit in the clinic.   We also offer nurse visits for many medical concerns. Just like any other service, we will bill your insurance company for this type of  "visit based on time spent on the phone with your provider. Not all insurance companies cover these visits. Please check with your medical insurance if this type of visit is covered. You will be responsible for any charges that are not paid by your insurance.      E-visits via salgomedhart:  generally incur a $35.00 fee.  Telephone visits:  Time spent on the phone: *charged based on time that is spent on the phone in increments of 10 minutes. Estimated cost:   5-10 mins $30.00   11-20 mins. $59.00   21-30 mins. $85.00   Use [employer] (secure email communication and access to your chart) to send your primary care provider a message or make an appointment. Ask someone on your Team how to sign up for [employer].    For a Price Quote for your services, please call our [employer] Line at [phone].    As always, Thank you for trusting us with your health care needs!    Discharged By:  CHOCO SANCHEZ            Follow-ups after your visit        Who to contact     If you have questions or need follow up information about today's clinic visit or your schedule please contact [facility] directly at [phone].  Normal or non-critical lab and imaging results will be communicated to you by MyChart, letter or phone within 4 business days after the clinic has received the results. If you do not hear from us within 7 days, please contact the clinic through salgomedhart or phone. If you have a critical or abnormal lab result, we will notify you by phone as soon as possible.  Submit refill requests through [employer] or call your pharmacy and they will forward the refill request to us. Please allow 3 business days for your refill to be completed.          Additional Information About Your Visit        [employer] Information     [employer] lets you send messages to your doctor, view your test results, renew your prescriptions, schedule appointments and more. To sign up, go to [email or web address] . Click on \"Log in\" on the left side of " "the screen, which will take you to the Welcome page. Then click on \"Sign up Now\" on the right side of the page.     You will be asked to enter the access code listed below, as well as some personal information. Please follow the directions to create your username and password.     Your access code is: [email or web address]  Expires: 2018 10:52 AM     Your access code will  in 90 days. If you need help or a new code, please call your [facility] clinic or [phone].        Care EveryWhere ID     This is your Care EveryWhere ID. This could be used by other organizations to access your [facility] medical records  [id number]        Your Vitals Were     Pulse Temperature Pulse Oximetry BMI (Body Mass Index)          45 97.5  F (36.4  C) (Oral) 99% 22 kg/m2         Blood Pressure from Last 3 Encounters:   17 130/60   17 142/70   16 138/70    Weight from Last 3 Encounters:   17 160 lb (72.6 kg)   17 165 lb 9.6 oz (75.1 kg)   16 164 lb 12.8 oz (74.8 kg)              We Performed the Following     Basic metabolic panel     Hemoglobin     PSA, tumor marker     TSH WITH FREE T4 REFLEX          Today's Medication Changes          These changes are accurate as of: 17 10:52 AM.  If you have any questions, ask your nurse or doctor.               These medicines have changed or have updated prescriptions.        Dose/Directions    amLODIPine 5 MG tablet   Commonly known as:  NORVASC   This may have changed:  See the new instructions.   Used for:  Essential hypertension with goal blood pressure less than 140/90   Changed by:  Inés Aragon APRN CNP        TAKE 1 TABLET EVERY DAY   Quantity:  90 tablet   Refills:  3       levothyroxine 88 MCG tablet   Commonly known as:  SYNTHROID/LEVOTHROID   This may have changed:  See the new instructions.   Used for:  Hypothyroidism, unspecified type   Changed by:  Inés Aragon APRN CNP        TAKE 1 TABLET EVERY DAY   Quantity:  " 90 tablet   Refills:  3       lisinopril-hydrochlorothiazide 20-25 MG per tablet   Commonly known as:  PRINZIDE/ZESTORETIC   This may have changed:  See the new instructions.   Used for:  Essential hypertension with goal blood pressure less than 140/90   Changed by:  Inés Aragon APRN CNP        TAKE 1 TABLET EVERY DAY   Quantity:  90 tablet   Refills:  3       metoprolol 25 MG tablet   Commonly known as:  LOPRESSOR   This may have changed:  See the new instructions.   Used for:  Essential hypertension with goal blood pressure less than 140/90   Changed by:  Inés Aragon APRN CNP        TAKE 1 TABLET TWICE DAILY   Quantity:  180 tablet   Refills:  3            Where to get your medicines      These medications were sent to [facility] - [facility] [address]  [address], [facility] [address]     Phone:  [phone]     amLODIPine 5 MG tablet    levothyroxine 88 MCG tablet    lisinopril-hydrochlorothiazide 20-25 MG per tablet    metoprolol 25 MG tablet                Primary Care Provider Office Phone # Fax #    Priyanka Noel [phone] [address] [address]        Equal Access to Services     [facility] HospitalMARTHA AH: Hadii calin ku hadasho Soomaali, waaxda luqadaha, qaybta kaalmada adeegyada, franklyn elam. So [facility] [phone].    ATENCIÓN: Si habla español, tiene a ruiz disposición servicios gratuitos de asistencia lingüística. [facility] [phone].    We comply with applicable federal civil rights laws and [place] laws. We do not discriminate on the basis of race, color, national origin, age, disability, sex, sexual orientation, or gender identity.            Thank you!     Thank you for choosing [facility]  for your care. Our goal is always to provide you with excellent care. Hearing back from our patients is one way we can continue to improve our services. Please take a  few minutes to complete the written survey that you may receive in the mail after your visit with us. Thank you!             Your Updated Medication List - Protect others around you: Learn how to safely use, store and throw away your medicines at www.disposemymeds.org.          This list is accurate as of: 11/9/17 10:52 AM.  Always use your most recent med list.                   Brand Name Dispense Instructions for use Diagnosis    amLODIPine 5 MG tablet    NORVASC    90 tablet    TAKE 1 TABLET EVERY DAY    Essential hypertension with goal blood pressure less than 140/90       aspirin 325 MG EC tablet      Take 162.5 mg by mouth daily.        augmented betamethasone dipropionate 0.05 % cream    DIPROLENE-AF    50 g    Apply sparingly to affected area twice daily as needed.  Do not apply to face.    Atopic dermatitis, unspecified type       levothyroxine 88 MCG tablet    SYNTHROID/LEVOTHROID    90 tablet    TAKE 1 TABLET EVERY DAY    Hypothyroidism, unspecified type       lisinopril-hydrochlorothiazide 20-25 MG per tablet    PRINZIDE/ZESTORETIC    90 tablet    TAKE 1 TABLET EVERY DAY    Essential hypertension with goal blood pressure less than 140/90       metoprolol 25 MG tablet    LOPRESSOR    180 tablet    TAKE 1 TABLET TWICE DAILY    Essential hypertension with goal blood pressure less than 140/90

## 2017-11-10 ASSESSMENT — ANXIETY QUESTIONNAIRES: GAD7 TOTAL SCORE: 0

## 2017-11-13 RX ORDER — LISINOPRIL AND HYDROCHLOROTHIAZIDE 12.5; 2 MG/1; MG/1
1 TABLET ORAL DAILY
Qty: 90 TABLET | Refills: 0 | Status: SHIPPED | OUTPATIENT
Start: 2017-11-13 | End: 2018-01-30

## 2017-11-16 ENCOUNTER — TELEPHONE (OUTPATIENT)
Dept: INTERNAL MEDICINE | Facility: CLINIC | Age: 82
End: 2017-11-16

## 2017-11-16 NOTE — TELEPHONE ENCOUNTER
Per Pharmacy:  RX for Metoprolol Tart 50 mg tab, and patient also has a active RX for Metoprolol Tart 25 mg tab.  Please clarify if current therapy is Metoprolol Tart 50 mg tab or Metoprolol 25 mg Tab    Caller spoke with patient:  He states he has a 50 mg Tablet and has been cutting them in half and taking 1/2 in the Morning and 1/2 in the Evening    SIG: for 50 mg reads:  Take 1 tablet twice daily         Caridad Emerson MA

## 2017-11-24 ENCOUNTER — OFFICE VISIT (OUTPATIENT)
Dept: FAMILY MEDICINE | Facility: CLINIC | Age: 82
End: 2017-11-24
Payer: COMMERCIAL

## 2017-11-24 VITALS
TEMPERATURE: 98.6 F | DIASTOLIC BLOOD PRESSURE: 70 MMHG | HEART RATE: 61 BPM | SYSTOLIC BLOOD PRESSURE: 118 MMHG | BODY MASS INDEX: 21.67 KG/M2 | WEIGHT: 157.6 LBS | OXYGEN SATURATION: 97 %

## 2017-11-24 DIAGNOSIS — Z91.81 AT RISK FOR FALLING: ICD-10-CM

## 2017-11-24 DIAGNOSIS — R00.1 BRADYCARDIA: ICD-10-CM

## 2017-11-24 DIAGNOSIS — I10 ESSENTIAL HYPERTENSION WITH GOAL BLOOD PRESSURE LESS THAN 140/90: Primary | ICD-10-CM

## 2017-11-24 DIAGNOSIS — E87.1 HYPONATREMIA: ICD-10-CM

## 2017-11-24 LAB
ANION GAP SERPL CALCULATED.3IONS-SCNC: 9 MMOL/L (ref 3–14)
BUN SERPL-MCNC: 27 MG/DL (ref 7–30)
CALCIUM SERPL-MCNC: 8.8 MG/DL (ref 8.5–10.1)
CHLORIDE SERPL-SCNC: 94 MMOL/L (ref 94–109)
CO2 SERPL-SCNC: 28 MMOL/L (ref 20–32)
CREAT SERPL-MCNC: 1.21 MG/DL (ref 0.66–1.25)
GFR SERPL CREATININE-BSD FRML MDRD: 56 ML/MIN/1.7M2
GLUCOSE SERPL-MCNC: 65 MG/DL (ref 70–99)
POTASSIUM SERPL-SCNC: 4.2 MMOL/L (ref 3.4–5.3)
SODIUM SERPL-SCNC: 131 MMOL/L (ref 133–144)

## 2017-11-24 PROCEDURE — 36415 COLL VENOUS BLD VENIPUNCTURE: CPT | Performed by: NURSE PRACTITIONER

## 2017-11-24 PROCEDURE — 80048 BASIC METABOLIC PNL TOTAL CA: CPT | Performed by: NURSE PRACTITIONER

## 2017-11-24 PROCEDURE — 99213 OFFICE O/P EST LOW 20 MIN: CPT | Performed by: NURSE PRACTITIONER

## 2017-11-24 RX ORDER — LISINOPRIL AND HYDROCHLOROTHIAZIDE 12.5; 2 MG/1; MG/1
1 TABLET ORAL DAILY
Qty: 90 TABLET | Refills: 0 | Status: CANCELLED | OUTPATIENT
Start: 2017-11-24

## 2017-11-24 ASSESSMENT — PAIN SCALES - GENERAL: PAINLEVEL: NO PAIN (0)

## 2017-11-24 NOTE — NURSING NOTE
"Chief Complaint   Patient presents with     Health Maintenance     Fall Risk      Recheck Medication     due to pulse being low       Initial /70 (BP Location: Right arm, Cuff Size: Adult Regular)  Pulse 61  Temp 98.6  F (37  C) (Oral)  Wt 157 lb 9.6 oz (71.5 kg)  SpO2 97%  BMI 21.67 kg/m2 Estimated body mass index is 21.67 kg/(m^2) as calculated from the following:    Height as of 4/17/17: 5' 11.5\" (1.816 m).    Weight as of this encounter: 157 lb 9.6 oz (71.5 kg).  Medication Reconciliation: complete       Earnestine Jimenez CMA    "

## 2017-11-24 NOTE — PROGRESS NOTES
"SUBJECTIVE:   Parish Driscoll is a 89 year old male who presents for Preventive Visit.  Are you in the first 12 months of your Medicare coverage?  No    HPI    {AWV Cognitive Screenin}    Reviewed and updated as needed this visit by clinical staff         Reviewed and updated as needed this visit by Provider        Social History   Substance Use Topics     Smoking status: Former Smoker     Years: 15.00     Types: Cigarettes     Quit date: 1960     Smokeless tobacco: Never Used     Alcohol use No       {ETOH AUDIT:644539}    {Outside tests to abstract? :259847}    {additional problems to add (Optional):235858}    Today's PHQ-2 Score:   PHQ-2 (  [employer]) 2016   Q1: Little interest or pleasure in doing things 0   Q2: Feeling down, depressed or hopeless 1   PHQ-2 Score 1       Do you feel safe in your environment - {YES/NO/NA:023397}    Do you have a Health Care Directive?: {HEALTHCARE DIRECTIVE STATUS:397626}    Current providers sharing in care for this patient include:   Patient Care Team:  Priyanka Noel MD as PCP - General (Internal Medicine)      Hearing impairment: {NO/YES:982413}    Ability to successfully perform activities of daily living: {YES/NO (MEDICARE):726987::\"Yes, no assistance needed\"}     Fall risk:  {Document Fall Risk in the Assessments Section of the Navigator:228345}    Home safety:  {IPPE SAFETY CONCERNS:636820::\"none identified\"}  {If any of the above assessments are answered yes, consider ordering appropriate referrals (Optional):027001::\"click delete button to remove this line now\"}    The following health maintenance items are reviewed in Epic and correct as of today:  Health Maintenance   Topic Date Due     FALL RISK ASSESSMENT  05/15/2016     ADVANCE DIRECTIVE PLANNING Q5 YRS  2016     INFLUENZA VACCINE (SYSTEM ASSIGNED)  2017     TSH Q1 YEAR  2018     TETANUS IMMUNIZATION (SYSTEM ASSIGNED)  2021     PNEUMOCOCCAL  Completed " "    {Chronicprobdata (Optional):243778}    {Decision Support (Optional):523225}    Review of Systems  {ROS COMP:044508}    OBJECTIVE:   There were no vitals taken for this visit. Estimated body mass index is 22 kg/(m^2) as calculated from the following:    Height as of 17: 5' 11.5\" (1.816 m).    Weight as of 17: 160 lb (72.6 kg).  Physical Exam  {Exam :686101}    ASSESSMENT / PLAN:   {Diag Picklist:822761}    End of Life Planning:  Patient currently has an advanced directive: { :815171}    COUNSELING:  {Medicare Counselin}    {BP Counseling- Complete if BP >= 120/80  (Optional):844705}    Estimated body mass index is 22 kg/(m^2) as calculated from the following:    Height as of 17: 5' 11.5\" (1.816 m).    Weight as of 17: 160 lb (72.6 kg).  {Weight Management Plan -- Complete if patient has an abnormal BMI (Optional):440589}   reports that he quit smoking about 57 years ago. His smoking use included Cigarettes. He quit after 15.00 years of use. He has never used smokeless tobacco.  {Tobacco Cessation -- Complete if patient is a smoker (Optional):196879}    Appropriate preventive services were discussed with this patient, including applicable screening as appropriate for cardiovascular disease, diabetes, osteopenia/osteoporosis, and glaucoma.  As appropriate for age/gender, discussed screening for colorectal cancer, prostate cancer, breast cancer, and cervical cancer. Checklist reviewing preventive services available has been given to the patient.    Reviewed patients plan of care and provided an AVS. The {CarePlan:043701} for Parish meets the Care Plan requirement. This Care Plan has been established and reviewed with the {PATIENT, FAMILY MEMBER, CAREGIVER:423626}.    Counseling Resources:  ATP IV Guidelines  Pooled Cohorts Equation Calculator  Breast Cancer Risk Calculator  FRAX Risk Assessment  ICSI Preventive Guidelines  Dietary Guidelines for Americans,   USDA's MyPlate  ASA " Prophylaxis  Lung CA Screening    CHELI Hardwick CNP  [facility]

## 2017-11-24 NOTE — PROGRESS NOTES
SUBJECTIVE:   Parish Driscoll is a 89 year old male who presents to clinic today for the following health issues:      Medication Followup of BP meds    Taking Medication as prescribed: yes    Side Effects:  None    Medication Helping Symptoms:  yes     Patient has not yet gotten the reduced dose of lisinopril/hctz from his pharmacy.  He did reduce his metoprolol as instructed and his pulse has increased slightly.  He feels well overall.          Problem list and histories reviewed & adjusted, as indicated.  Additional history: as documented    Patient Active Problem List   Diagnosis     Hypothyroidism     Advanced directives, counseling/discussion     Hypertension goal BP (blood pressure) < 140/90     Hyperlipidemia with target LDL less than 130     Prostate cancer (H)     Hyponatremia     Edema     Right thigh pain     Inflammation of joint of right knee     Past Surgical History:   Procedure Laterality Date     CYSTECTOMY BRANCHIAL CLEFT  11/2015     CYSTOSCOPY  06/2003     TONSILLECTOMY      at age 12       Social History   Substance Use Topics     Smoking status: Former Smoker     Years: 15.00     Types: Cigarettes     Quit date: 1/1/1960     Smokeless tobacco: Never Used     Alcohol use No     Family History   Problem Relation Age of Onset     Hypertension Mother      Hypertension Father      Depression Father      Asthma Sister      Depression Sister      DIABETES Maternal Grandmother      CANCER Maternal Grandfather      Stomach     HEART DISEASE No family hx of          Current Outpatient Prescriptions   Medication Sig Dispense Refill     lisinopril-hydrochlorothiazide (PRINZIDE/ZESTORETIC) 20-12.5 MG per tablet Take 1 tablet by mouth daily 90 tablet 0     metoprolol (LOPRESSOR) 25 MG tablet TAKE 1 TABLET TWICE DAILY 180 tablet 3     amLODIPine (NORVASC) 5 MG tablet TAKE 1 TABLET EVERY DAY 90 tablet 3     levothyroxine (SYNTHROID/LEVOTHROID) 88 MCG tablet TAKE 1 TABLET EVERY DAY 90 tablet 3      augmented betamethasone dipropionate (DIPROLENE-AF) 0.05 % cream Apply sparingly to affected area twice daily as needed.  Do not apply to face. 50 g 2     aspirin 325 MG EC tablet Take 162.5 mg by mouth daily.       Allergies   Allergen Reactions     Mold      Nkda [No Known Drug Allergies]      Seasonal Allergies Other (See Comments)     Runny nose     BP Readings from Last 3 Encounters:   11/24/17 118/70   11/09/17 130/60   04/17/17 142/70    Wt Readings from Last 3 Encounters:   11/24/17 157 lb 9.6 oz (71.5 kg)   11/09/17 160 lb (72.6 kg)   04/17/17 165 lb 9.6 oz (75.1 kg)                  Labs reviewed in EPIC        Reviewed and updated as needed this visit by clinical staff       Reviewed and updated as needed this visit by Provider         ROS:  Constitutional, HEENT, cardiovascular, pulmonary, gi and gu systems are negative, except as otherwise noted.      OBJECTIVE:   /70 (BP Location: Right arm, Cuff Size: Adult Regular)  Pulse 61  Temp 98.6  F (37  C) (Oral)  Wt 157 lb 9.6 oz (71.5 kg)  SpO2 97%  BMI 21.67 kg/m2  Body mass index is 21.67 kg/(m^2).   GENERAL: healthy, alert and no distress  RESP: lungs clear to auscultation - no rales, rhonchi or wheezes  CV: regular rate and rhythm, normal S1 S2, no S3 or S4, no murmur, click or rub, no peripheral edema and peripheral pulses strong  MS: no gross musculoskeletal defects noted, no edema    Diagnostic Test Results:  pending    ASSESSMENT:       PLAN:   1. Essential hypertension with goal blood pressure less than 140/90  Blood pressure at goal.  Will await lab results for hyponatremia and may have patient return for blood pressure recheck for reduced dose of lisinopril/hctz pending lab results.    2. At risk for falling      3. Bradycardia  Improved with decreased dose of metoprolol.        FUTURE APPOINTMENTS:       - Follow-up for annual visit or as needed    CHELI Hardwick [facility]

## 2017-11-24 NOTE — MR AVS SNAPSHOT
After Visit Summary   11/24/2017    Parish Driscoll    MRN: [id number]           Patient Information     Date Of Birth          10/10/1926        Visit Information        Provider Department      11/24/2017 10:40 AM Inés Aragon APRN [facility]        Today's Diagnoses     Essential hypertension with goal blood pressure less than 140/90    -  1    At risk for falling        Bradycardia          Care Instructions    [facility]    If you have any questions regarding to your visit please contact your care team:     Team Pink:   Clinic Hours Telephone Number   Internal Medicine:  Dr. Priyanka Aragon, NP       7am-7pm  Monday - Thursday   7am-5pm  Fridays  [phone]  (Appointment scheduling available 24/7)    Questions about your visit?  Team Line  [phone]   Urgent Care - [place] [facility] - 11am-9pm Monday-Friday Saturday-Sunday- 9am-5pm   [place] - 5pm-9pm Monday-Friday Saturday-Sunday- 9am-5pm  [phone] - [facility]  [phone] - [place]       What options do I have for visits at the clinic other than the traditional office visit?  To expand how we care for you, many of our providers are utilizing electronic visits (e-visits) and telephone visits, when medically appropriate, for interactions with their patients rather than a visit in the clinic.   We also offer nurse visits for many medical concerns. Just like any other service, we will bill your insurance company for this type of visit based on time spent on the phone with your provider. Not all insurance companies cover these visits. Please check with your medical insurance if this type of visit is covered. You will be responsible for any charges that are not paid by your insurance.      E-visits via [employer]:  generally incur a $35.00 fee.  Telephone visits:  Time spent on the phone: *charged based on time that is spent on the phone  "in increments of 10 minutes. Estimated cost:   5-10 mins $30.00   11-20 mins. $59.00   21-30 mins. $85.00   Use [employer] V[employer] (secure email communication and access to your chart) to send your primary care provider a message or make an appointment. Ask someone on your Team how to sign up for [employer].    For a Price Quote for your services, please call our [employer] Line at [phone].    As always, Thank you for trusting us with your health care needs!    Earnestine Jimenez, [facility]          Follow-ups after your visit        Who to contact     If you have questions or need follow up information about today's clinic visit or your schedule please contact [facility] directly at [phone].  Normal or non-critical lab and imaging results will be communicated to you by V[employer] Viohart, letter or phone within 4 business days after the clinic has received the results. If you do not hear from us within 7 days, please contact the clinic through [employer] or phone. If you have a critical or abnormal lab result, we will notify you by phone as soon as possible.  Submit refill requests through [employer] or call your pharmacy and they will forward the refill request to us. Please allow 3 business days for your refill to be completed.          Additional Information About Your Visit        Vital VioharWibiData Information     [employer] lets you send messages to your doctor, view your test results, renew your prescriptions, schedule appointments and more. To sign up, go to [email or web address] . Click on \"Log in\" on the left side of the screen, which will take you to the Welcome page. Then click on \"Sign up Now\" on the right side of the page.     You will be asked to enter the access code listed below, as well as some personal information. Please follow the directions to create your username and password.     Your access code is: [email or web address]  Expires: 2018 10:52 AM     Your access code will  in 90 days. If you need help or a new code, " please call your [facility] clinic or [phone].        Care EveryWhere ID     This is your Care EveryWhere ID. This could be used by other organizations to access your [facility] medical records  [id number]        Your Vitals Were     Pulse Temperature Pulse Oximetry BMI (Body Mass Index)          61 98.6  F (37  C) (Oral) 97% 21.67 kg/m2         Blood Pressure from Last 3 Encounters:   11/24/17 118/70   11/09/17 130/60   04/17/17 142/70    Weight from Last 3 Encounters:   11/24/17 157 lb 9.6 oz (71.5 kg)   11/09/17 160 lb (72.6 kg)   04/17/17 165 lb 9.6 oz (75.1 kg)              Today, you had the following     No orders found for display       Primary Care Provider Office Phone # Fax #    Priyanka Belgica Noel [phone]       [address] [facility] [address]        Equal Access to Services     [facility] : Hadii aad ku hadasho Soomaali, waaxda luqadaha, qaybta kaalmada adeegyada, waxay idiin hayaan deanneg kharabarron la'amayan . So [facility] [phone].    ATENCIÓN: Si habla español, tiene a ruiz disposición servicios gratuitos de asistencia lingüística. Llame al [phone].    We comply with applicable federal civil rights laws and [place] laws. We do not discriminate on the basis of race, color, national origin, age, disability, sex, sexual orientation, or gender identity.            Thank you!     Thank you for choosing [facility]  for your care. Our goal is always to provide you with excellent care. Hearing back from our patients is one way we can continue to improve our services. Please take a few minutes to complete the written survey that you may receive in the mail after your visit with us. Thank you!             Your Updated Medication List - Protect others around you: Learn how to safely use, store and throw away your medicines at www.disposemymeds.org.          This list is accurate as of: 11/24/17 11:04 AM.  Always use your most recent med list.                   Brand  Name Dispense Instructions for use Diagnosis    amLODIPine 5 MG tablet    NORVASC    90 tablet    TAKE 1 TABLET EVERY DAY    Essential hypertension with goal blood pressure less than 140/90       aspirin 325 MG EC tablet      Take 162.5 mg by mouth daily.        augmented betamethasone dipropionate 0.05 % cream    DIPROLENE-AF    50 g    Apply sparingly to affected area twice daily as needed.  Do not apply to face.    Atopic dermatitis, unspecified type       levothyroxine 88 MCG tablet    SYNTHROID/LEVOTHROID    90 tablet    TAKE 1 TABLET EVERY DAY    Hypothyroidism, unspecified type       lisinopril-hydrochlorothiazide 20-12.5 MG per tablet    PRINZIDE/ZESTORETIC    90 tablet    Take 1 tablet by mouth daily    Essential hypertension with goal blood pressure less than 140/90       metoprolol 25 MG tablet    LOPRESSOR    180 tablet    TAKE 1 TABLET TWICE DAILY    Essential hypertension with goal blood pressure less than 140/90

## 2017-11-24 NOTE — PATIENT INSTRUCTIONS
[facility]    If you have any questions regarding to your visit please contact your care team:     Team Pink:   Clinic Hours Telephone Number   Internal Medicine:  Dr. Priyanka Aragon NP       7am-7pm  Monday - Thursday   7am-5pm  Fridays  [phone]  (Appointment scheduling available 24/7)    Questions about your visit?  Team Line  [phone]   Ur[facility] - [place] [facility] - 11am-9pm Monday-Friday Saturday-Sunday- 9am-5pm   [place] - 5pm-9pm Monday-Friday Saturday-Sunday- 9am-5pm  [phone] - [place]   [phone] - [place]       What options do I have for visits at the clinic other than the traditional office visit?  To expand how we care for you, many of our providers are utilizing electronic visits (e-visits) and telephone visits, when medically appropriate, for interactions with their patients rather than a visit in the clinic.   We also offer nurse visits for many medical concerns. Just like any other service, we will bill your insurance company for this type of visit based on time spent on the phone with your provider. Not all insurance companies cover these visits. Please check with your medical insurance if this type of visit is covered. You will be responsible for any charges that are not paid by your insurance.      E-visits via [employer]:  generally incur a $35.00 fee.  Telephone visits:  Time spent on the phone: *charged based on time that is spent on the phone in increments of 10 minutes. Estimated cost:   5-10 mins $30.00   11-20 mins. $59.00   21-30 mins. $85.00   Use [employer] (secure email communication and access to your chart) to send your primary care provider a message or make an appointment. Ask someone on your Team how to sign up for [employer].    For a Price Quote for your services, please call our Consumer Price Line at [phone].    As always, Thank you for trusting us with your health care  needs!    Earnestine Jimenez, CMA

## 2017-11-29 DIAGNOSIS — I10 ESSENTIAL HYPERTENSION WITH GOAL BLOOD PRESSURE LESS THAN 140/90: ICD-10-CM

## 2017-11-29 RX ORDER — LISINOPRIL 20 MG/1
TABLET ORAL
Qty: 30 TABLET | Refills: 0 | COMMUNITY
Start: 2017-11-29 | End: 2018-06-04

## 2017-12-06 ENCOUNTER — ALLIED HEALTH/NURSE VISIT (OUTPATIENT)
Dept: NURSING | Facility: CLINIC | Age: 82
End: 2017-12-06
Payer: COMMERCIAL

## 2017-12-06 VITALS — SYSTOLIC BLOOD PRESSURE: 134 MMHG | DIASTOLIC BLOOD PRESSURE: 62 MMHG

## 2017-12-06 DIAGNOSIS — I10 HYPERTENSION GOAL BP (BLOOD PRESSURE) < 140/90: Primary | ICD-10-CM

## 2017-12-06 DIAGNOSIS — E87.1 HYPONATREMIA: ICD-10-CM

## 2017-12-06 LAB
ANION GAP SERPL CALCULATED.3IONS-SCNC: 7 MMOL/L (ref 3–14)
BUN SERPL-MCNC: 27 MG/DL (ref 7–30)
CALCIUM SERPL-MCNC: 9.1 MG/DL (ref 8.5–10.1)
CHLORIDE SERPL-SCNC: 95 MMOL/L (ref 94–109)
CO2 SERPL-SCNC: 30 MMOL/L (ref 20–32)
CREAT SERPL-MCNC: 1.23 MG/DL (ref 0.66–1.25)
GFR SERPL CREATININE-BSD FRML MDRD: 55 ML/MIN/1.7M2
GLUCOSE SERPL-MCNC: 84 MG/DL (ref 70–99)
POTASSIUM SERPL-SCNC: 4.4 MMOL/L (ref 3.4–5.3)
SODIUM SERPL-SCNC: 132 MMOL/L (ref 133–144)

## 2017-12-06 PROCEDURE — 36415 COLL VENOUS BLD VENIPUNCTURE: CPT | Performed by: NURSE PRACTITIONER

## 2017-12-06 PROCEDURE — 80048 BASIC METABOLIC PNL TOTAL CA: CPT | Performed by: NURSE PRACTITIONER

## 2017-12-06 PROCEDURE — 99207 ZZC NO CHARGE NURSE ONLY: CPT

## 2017-12-06 NOTE — NURSING NOTE
Parish Driscoll is a 89 year old male who comes in today for a Blood Pressure check because of medication change.    *Document pulse and BP  *Use new set of vitals button for multiple readings.  *Use extended vitals for orthostatic    Vitals as recorded, a regular cuff was used.    Patient is taking medication as prescribed  Patient is tolerating medications well.  Patient is not monitoring Blood Pressure at home.      Current complaints: none    Disposition: patient to continue with the same medication    Diana Casarez MA

## 2017-12-06 NOTE — MR AVS SNAPSHOT
"              After Visit Summary   2017    Parish Driscoll    MRN: [id number]           Patient Information     Date Of Birth          10/10/1926        Visit Information        Provider Department      2017 10:30 AM [facility]        Today's Diagnoses     Hypertension goal BP (blood pressure) < 140/90    -  1       Follow-ups after your visit        Who to contact     If you have questions or need follow up information about today's clinic visit or your schedule please contact [facility] directly at [phone].  Normal or non-critical lab and imaging results will be communicated to you by [employer], letter or phone within 4 business days after the clinic has received the results. If you do not hear from us within 7 days, please contact the clinic through [employer] or phone. If you have a critical or abnormal lab result, we will notify you by phone as soon as possible.  Submit refill requests through [email or web address] or call your pharmacy and they will forward the refill request to us. Please allow 3 business days for your refill to be completed.          Additional Information About Your Visit        MyChart Information     [email or web address] lets you send messages to your doctor, view your test results, renew your prescriptions, schedule appointments and more. To sign up, go to [email or web address] . Click on \"Log in\" on the left side of the screen, which will take you to the Welcome page. Then click on \"Sign up Now\" on the right side of the page.     You will be asked to enter the access code listed below, as well as some personal information. Please follow the directions to create your username and password.     Your access code is: [email or web address]  Expires: 2018 10:52 AM     Your access code will  in 90 days. If you need help or a new code, please call your [facility] or [phone].        Care EveryWhere ID     This is your Care EveryWhere ID. This could be used " by other organizations to access your [place] medical records  [id number]         Blood Pressure from Last 3 Encounters:   12/06/17 134/62   11/24/17 118/70   11/09/17 130/60    Weight from Last 3 Encounters:   11/24/17 157 lb 9.6 oz (71.5 kg)   11/09/17 160 lb (72.6 kg)   04/17/17 165 lb 9.6 oz (75.1 kg)              Today, you had the following     No orders found for display       Primary Care Provider Office Phone # Fax #    Priyanka Noel [phone]       [address] [facility] [address]        Equal Access to Services     KAE BROWN : Hadii calin johnson Socarlos, waaxda biju, qaybta kaalmada dana, franklyn elam. So [facility] [phone].    ATENCIÓN: Si habla español, tiene a ruiz disposición servicios gratuitos de asistencia lingüística. Llame al [phone].    We comply with applicable federal civil rights laws and [place] laws. We do not discriminate on the basis of race, color, national origin, age, disability, sex, sexual orientation, or gender identity.            Thank you!     Thank you for choosing [facility]  for your care. Our goal is always to provide you with excellent care. Hearing back from our patients is one way we can continue to improve our services. Please take a few minutes to complete the written survey that you may receive in the mail after your visit with us. Thank you!             Your Updated Medication List - Protect others around you: Learn how to safely use, store and throw away your medicines at www.disposemymeds.org.          This list is accurate as of: 12/6/17 11:47 AM.  Always use your most recent med list.                   Brand Name Dispense Instructions for use Diagnosis    amLODIPine 5 MG tablet    NORVASC    90 tablet    TAKE 1 TABLET EVERY DAY    Essential hypertension with goal blood pressure less than 140/90       aspirin 325 MG EC tablet      Take 162.5 mg by mouth daily.         augmented betamethasone dipropionate 0.05 % cream    DIPROLENE-AF    50 g    Apply sparingly to affected area twice daily as needed.  Do not apply to face.    Atopic dermatitis, unspecified type       levothyroxine 88 MCG tablet    SYNTHROID/LEVOTHROID    90 tablet    TAKE 1 TABLET EVERY DAY    Hypothyroidism, unspecified type       lisinopril 20 MG tablet    PRINIVIL/ZESTRIL    30 tablet    TAKE 1 TABLET EVERY DAY  (NEED FOLLOW UP IN CLINIC BEFORE ANY FURTHER REFILLS)    Essential hypertension with goal blood pressure less than 140/90       lisinopril-hydrochlorothiazide 20-12.5 MG per tablet    PRINZIDE/ZESTORETIC    90 tablet    Take 1 tablet by mouth daily    Essential hypertension with goal blood pressure less than 140/90       metoprolol 25 MG tablet    LOPRESSOR    180 tablet    TAKE 1 TABLET TWICE DAILY    Essential hypertension with goal blood pressure less than 140/90

## 2018-01-30 DIAGNOSIS — I10 ESSENTIAL HYPERTENSION WITH GOAL BLOOD PRESSURE LESS THAN 140/90: ICD-10-CM

## 2018-01-31 RX ORDER — LISINOPRIL AND HYDROCHLOROTHIAZIDE 12.5; 2 MG/1; MG/1
1 TABLET ORAL DAILY
Qty: 90 TABLET | Refills: 0 | Status: SHIPPED | OUTPATIENT
Start: 2018-01-31 | End: 2018-04-04

## 2018-05-29 NOTE — PROGRESS NOTES
SUBJECTIVE:   Parish Driscoll is a 89 year old male who presents to clinic today for the following health issues:      Hypertension Follow-up      Outpatient blood pressures are not being checked.    Low Salt Diet: no added salt      Amount of exercise or physical activity: None    Problems taking medications regularly: No    Medication side effects: none    Diet: regular (no restrictions)    Patient denies any significant dizziness, chest pain, shortness of breath, edema.  He feels well overall.      Problem list and histories reviewed & adjusted, as indicated.  Additional history: as documented    Patient Active Problem List   Diagnosis     Hypothyroidism     Advanced directives, counseling/discussion     Hypertension goal BP (blood pressure) < 140/90     Hyperlipidemia with target LDL less than 130     Prostate cancer (H)     Hyponatremia     Edema     Right thigh pain     Inflammation of joint of right knee     Past Surgical History:   Procedure Laterality Date     CYSTECTOMY BRANCHIAL CLEFT  11/2015     CYSTOSCOPY  06/2003     TONSILLECTOMY      at age 12       Social History   Substance Use Topics     Smoking status: Former Smoker     Years: 15.00     Types: Cigarettes     Quit date: 1/1/1960     Smokeless tobacco: Never Used     Alcohol use No     Family History   Problem Relation Age of Onset     Hypertension Mother      Hypertension Father      Depression Father      Asthma Sister      Depression Sister      DIABETES Maternal Grandmother      CANCER Maternal Grandfather      Stomach     HEART DISEASE No family hx of          Current Outpatient Prescriptions   Medication Sig Dispense Refill     amLODIPine (NORVASC) 5 MG tablet TAKE 1 TABLET EVERY DAY 90 tablet 3     aspirin 81 MG EC tablet Take 1 tablet (81 mg) by mouth daily 90 tablet 3     augmented betamethasone dipropionate (DIPROLENE-AF) 0.05 % cream Apply sparingly to affected area twice daily as needed.  Do not apply to face. 50 g 2      "levothyroxine (SYNTHROID/LEVOTHROID) 88 MCG tablet TAKE 1 TABLET EVERY DAY 90 tablet 3     lisinopril (PRINIVIL/ZESTRIL) 20 MG tablet TAKE 1 TABLET EVERY DAY IN ADDITION TO LISINOPRIL/HCTZ 20-12.5 MG. 90 tablet 1     lisinopril-hydrochlorothiazide (PRINZIDE/ZESTORETIC) 20-12.5 MG per tablet TAKE 1 TABLET EVERY DAY IN ADDITION TO LISINOPRIL 20 MG DAILY. 90 tablet 1     metoprolol (LOPRESSOR) 25 MG tablet TAKE 1 TABLET TWICE DAILY 180 tablet 3     [DISCONTINUED] lisinopril (PRINIVIL/ZESTRIL) 20 MG tablet TAKE 1 TABLET EVERY DAY  (NEED FOLLOW UP IN CLINIC BEFORE ANY FURTHER REFILLS) 30 tablet 0     [DISCONTINUED] lisinopril-hydrochlorothiazide (PRINZIDE/ZESTORETIC) 20-12.5 MG per tablet TAKE 1 TABLET EVERY DAY 90 tablet 0     Allergies   Allergen Reactions     Mold      Nkda [No Known Drug Allergies]      Seasonal Allergies Other (See Comments)     Runny nose     BP Readings from Last 3 Encounters:   06/04/18 120/60   12/06/17 134/62   11/24/17 118/70    Wt Readings from Last 3 Encounters:   06/04/18 159 lb 6.4 oz (72.3 kg)   11/24/17 157 lb 9.6 oz (71.5 kg)   11/09/17 160 lb (72.6 kg)                  Labs reviewed in EPIC    Reviewed and updated as needed this visit by clinical staff       Reviewed and updated as needed this visit by Provider         ROS:  Constitutional, HEENT, cardiovascular, pulmonary, gi and gu systems are negative, except as otherwise noted.    OBJECTIVE:     /60  Pulse 59  Temp 97  F (36.1  C) (Oral)  Resp 16  Ht 5' 11\" (1.803 m)  Wt 159 lb 6.4 oz (72.3 kg)  SpO2 97%  BMI 22.23 kg/m2  Body mass index is 22.23 kg/(m^2).  GENERAL: healthy, alert and no distress  RESP: lungs clear to auscultation - no rales, rhonchi or wheezes  CV: regular rate and rhythm, normal S1 S2, no S3 or S4, no murmur, click or rub, no peripheral edema and peripheral pulses strong  MS: no gross musculoskeletal defects noted, no edema    Diagnostic Test Results:  pending     ASSESSMENT/PLAN:     1. Essential " hypertension with goal blood pressure less than 140/90  Patient to decrease aspirin to 81 mg daily.  Consider stopping aspirin completely in the future.  Blood pressure is stable.  Continue current dose of medications.  - lisinopril (PRINIVIL/ZESTRIL) 20 MG tablet; TAKE 1 TABLET EVERY DAY IN ADDITION TO LISINOPRIL/HCTZ 20-12.5 MG.  Dispense: 90 tablet; Refill: 1  - lisinopril-hydrochlorothiazide (PRINZIDE/ZESTORETIC) 20-12.5 MG per tablet; TAKE 1 TABLET EVERY DAY IN ADDITION TO LISINOPRIL 20 MG DAILY.  Dispense: 90 tablet; Refill: 1  - Basic metabolic panel  - Hemoglobin  - aspirin 81 MG EC tablet; Take 1 tablet (81 mg) by mouth daily  Dispense: 90 tablet; Refill: 3    FUTURE APPOINTMENTS:       - Follow-up visit in 6 months.    CHELI Hardwick [facility]

## 2018-06-04 ENCOUNTER — OFFICE VISIT (OUTPATIENT)
Dept: FAMILY MEDICINE | Facility: CLINIC | Age: 83
End: 2018-06-04
Payer: COMMERCIAL

## 2018-06-04 VITALS
WEIGHT: 159.4 LBS | RESPIRATION RATE: 16 BRPM | DIASTOLIC BLOOD PRESSURE: 60 MMHG | SYSTOLIC BLOOD PRESSURE: 120 MMHG | HEIGHT: 71 IN | TEMPERATURE: 97 F | BODY MASS INDEX: 22.31 KG/M2 | OXYGEN SATURATION: 97 % | HEART RATE: 59 BPM

## 2018-06-04 DIAGNOSIS — I10 ESSENTIAL HYPERTENSION WITH GOAL BLOOD PRESSURE LESS THAN 140/90: Primary | ICD-10-CM

## 2018-06-04 LAB
ANION GAP SERPL CALCULATED.3IONS-SCNC: 9 MMOL/L (ref 3–14)
BUN SERPL-MCNC: 25 MG/DL (ref 7–30)
CALCIUM SERPL-MCNC: 9.2 MG/DL (ref 8.5–10.1)
CHLORIDE SERPL-SCNC: 96 MMOL/L (ref 94–109)
CO2 SERPL-SCNC: 29 MMOL/L (ref 20–32)
CREAT SERPL-MCNC: 1.06 MG/DL (ref 0.66–1.25)
GFR SERPL CREATININE-BSD FRML MDRD: 65 ML/MIN/1.7M2
GLUCOSE SERPL-MCNC: 82 MG/DL (ref 70–99)
HGB BLD-MCNC: 12.4 G/DL (ref 13.3–17.7)
POTASSIUM SERPL-SCNC: 4.4 MMOL/L (ref 3.4–5.3)
SODIUM SERPL-SCNC: 134 MMOL/L (ref 133–144)

## 2018-06-04 PROCEDURE — 36415 COLL VENOUS BLD VENIPUNCTURE: CPT | Performed by: NURSE PRACTITIONER

## 2018-06-04 PROCEDURE — 85018 HEMOGLOBIN: CPT | Performed by: NURSE PRACTITIONER

## 2018-06-04 PROCEDURE — 99213 OFFICE O/P EST LOW 20 MIN: CPT | Performed by: NURSE PRACTITIONER

## 2018-06-04 PROCEDURE — 99207 C PAF COMPLETED  NO CHARGE: CPT | Performed by: NURSE PRACTITIONER

## 2018-06-04 PROCEDURE — 80048 BASIC METABOLIC PNL TOTAL CA: CPT | Performed by: NURSE PRACTITIONER

## 2018-06-04 RX ORDER — LISINOPRIL AND HYDROCHLOROTHIAZIDE 12.5; 2 MG/1; MG/1
TABLET ORAL
Qty: 90 TABLET | Refills: 1 | Status: SHIPPED | OUTPATIENT
Start: 2018-06-04 | End: 2018-10-24

## 2018-06-04 RX ORDER — BETAMETHASONE DIPROPIONATE 0.5 MG/G
CREAM TOPICAL
Qty: 50 G | Refills: 2 | Status: CANCELLED | OUTPATIENT
Start: 2018-06-04

## 2018-06-04 RX ORDER — LISINOPRIL 20 MG/1
TABLET ORAL
Qty: 90 TABLET | Refills: 1 | Status: SHIPPED | OUTPATIENT
Start: 2018-06-04 | End: 2018-10-24

## 2018-06-04 ASSESSMENT — PAIN SCALES - GENERAL: PAINLEVEL: NO PAIN (0)

## 2018-06-04 NOTE — PATIENT INSTRUCTIONS
Check with insurance to see if Shingrix (shingles) vaccine is better covered at the clinic or at the pharmacy.  If it's better covered at the pharmacy you may walk in at any time and get vaccine.  If it's better covered at clinic, schedule a nurse visit for injection.    Decrease aspirin to 81 mg daily.    [facility]    If you have any questions regarding to your visit please contact your care team:     Team Pink:   Clinic Hours Telephone Number   Internal Medicine:  Dr. Priyanka Aragon, NP       7am-7pm  Monday - Thursday   7am-5pm  Fridays  [phone]  (Appointment scheduling available 24/7)    Questions about your recent visit?  Team Line  [phone]   Urgent Care - [place] and [facility] - 11am-9pm Monday-Friday Saturday-Sunday- 9am-5pm   [place] - 5pm-9pm Monday-Friday Saturday-Sunday- 9am-5pm  [phone] - [place]  [phone] - [place]       What options do I have for a visit other than an office visit? We offer electronic visits (e-visits) and telephone visits, when medically appropriate.  Please check with your medical insurance to see if these types of visits are covered, as you will be responsible for any charges that are not paid by your insurance.      You can use Don[employer] (secure electronic communication) to access to your chart, send your primary care provider a message, or make an appointment. Ask a team member how to get started.     For a price quote for your services, please call our Consumer Price Line at [phone] or our Imaging Cost estimation line at [phone] (for imaging tests).    Earnestine Jimenez, CMA

## 2018-06-04 NOTE — PROGRESS NOTES
Dear Parish,    Your recent test results are attached.      Stable anemia.  Normal kidney function and electrolytes.      If you have any questions please feel free to contact [phone] or myself via [employer].    Sincerely,  Inés Aragon, CNP

## 2018-06-04 NOTE — LETTER
[address] [place] [address]    June 5, 2018    Parish Driscoll  [address] [place] [facility] [place] [address]          Dear Parish,  Stable anemia.   Normal kidney function and electrolytes.   Enclosed is a copy of your results.     Results for orders placed or performed in visit on 06/04/18   Basic metabolic panel   Result Value Ref Range    Sodium 134 133 - 144 mmol/L    Potassium 4.4 3.4 - 5.3 mmol/L    Chloride 96 94 - 109 mmol/L    Carbon Dioxide 29 20 - 32 mmol/L    Anion Gap 9 3 - 14 mmol/L    Glucose 82 70 - 99 mg/dL    Urea Nitrogen 25 7 - 30 mg/dL    Creatinine 1.06 0.66 - 1.25 mg/dL    GFR Estimate 65 >60 mL/min/1.7m2    GFR Estimate If Black 79 >60 mL/min/1.7m2    Calcium 9.2 8.5 - 10.1 mg/dL   Hemoglobin   Result Value Ref Range    Hemoglobin 12.4 (L) 13.3 - 17.7 g/dL       If you have any questions or concerns, please call myself or my nurse at [phone].    Sincerely,    Inés Aragon CNP/pb

## 2018-06-04 NOTE — MR AVS SNAPSHOT
After Visit Summary   6/4/2018    Parish Driscoll    MRN: [id number]           Patient Information     Date Of Birth          10/10/1926        Visit Information        Provider Department      6/4/2018 10:00 AM Inés Aragon APRN [facility]        Today's Diagnoses     Essential hypertension with goal blood pressure less than 140/90    -  1      Care Instructions    Check with insurance to see if Shingrix (shingles) vaccine is better covered at the clinic or at the pharmacy.  If it's better covered at the pharmacy you may walk in at any time and get vaccine.  If it's better covered at clinic, schedule a nurse visit for injection.    Decrease aspirin to 81 mg daily.    [facility]    If you have any questions regarding to your visit please contact your care team:     Team Pink:   Clinic Hours Telephone Number   Internal Medicine:  Dr. Priyanka Aragon, NP       7am-7pm  Monday - Thursday   7am-5pm  Fridays  [phone]  (Appointment scheduling available 24/7)    Questions about your recent visit?  Team Line  [phone]   Urgent Care - [place] and [facility] - 11am-9pm Monday-Friday Saturday-Sunday- 9am-5pm   [place] - 5pm-9pm Monday-Friday Saturday-Sunday- 9am-5pm  [phone] - [place]  [phone] - [place]       What options do I have for a visit other than an office visit? We offer electronic visits (e-visits) and telephone visits, when medically appropriate.  Please check with your medical insurance to see if these types of visits are covered, as you will be responsible for any charges that are not paid by your insurance.      You can use CellScape (secure electronic communication) to access to your chart, send your primary care provider a message, or make an appointment. Ask a team member how to get started.     For a price quote for your services, please call our Consumer Price Line at  [phone] or our Imaging Cost estimation line at [phone] (for imaging tests).    Earnestine Jimenez [facility]          Follow-ups after your visit        Follow-up notes from your care team     Return in about 6 months (around 12/4/2018) for Medication Recheck.      Who to contact     If you have questions or need follow up information about today's clinic visit or your schedule please contact [facility] directly at [phone].  Normal or non-critical lab and imaging results will be communicated to you by MyChart, letter or phone within 4 business days after the clinic has received the results. If you do not hear from us within 7 days, please contact the clinic through MyChart or phone. If you have a critical or abnormal lab result, we will notify you by phone as soon as possible.  Submit refill requests through [employer] or call your pharmacy and they will forward the refill request to us. Please allow 3 business days for your refill to be completed.          Additional Information About Your Visit        Care EveryWhere ID     This is your Care EveryWhere ID. This could be used by other organizations to access your [place] medical records  [id number]        Your Vitals Were     Pulse Temperature Respirations Height Pulse Oximetry BMI (Body Mass Index)    59 97  F (36.1  C) (Oral) 16 5' 11\" (1.803 m) 97% 22.23 kg/m2       Blood Pressure from Last 3 Encounters:   06/04/18 120/60   12/06/17 134/62   11/24/17 118/70    Weight from Last 3 Encounters:   06/04/18 159 lb 6.4 oz (72.3 kg)   11/24/17 157 lb 9.6 oz (71.5 kg)   11/09/17 160 lb (72.6 kg)              We Performed the Following     Basic metabolic panel     Hemoglobin     PAF COMPLETED          Today's Medication Changes          These changes are accurate as of 6/4/18 10:41 AM.  If you have any questions, ask your nurse or doctor.               These medicines have changed or have updated prescriptions.        Dose/Directions    aspirin 81 MG EC " tablet   This may have changed:    - medication strength  - See the new instructions.   Used for:  Essential hypertension with goal blood pressure less than 140/90   Changed by:  Inés Aragon APRN CNP        Dose:  81 mg   Take 1 tablet (81 mg) by mouth daily   Quantity:  90 tablet   Refills:  3       lisinopril 20 MG tablet   Commonly known as:  PRINIVIL/ZESTRIL   This may have changed:  additional instructions   Used for:  Essential hypertension with goal blood pressure less than 140/90   Changed by:  Inés Aragon APRN CNP        TAKE 1 TABLET EVERY DAY IN ADDITION TO LISINOPRIL/HCTZ 20-12.5 MG.   Quantity:  90 tablet   Refills:  1       lisinopril-hydrochlorothiazide 20-12.5 MG per tablet   Commonly known as:  PRINZIDE/ZESTORETIC   This may have changed:  See the new instructions.   Used for:  Essential hypertension with goal blood pressure less than 140/90   Changed by:  Inés Aragon APRN CNP        TAKE 1 TABLET EVERY DAY IN ADDITION TO LISINOPRIL 20 MG DAILY.   Quantity:  90 tablet   Refills:  1            Where to get your medicines      These medications were sent to [facility] - [facility] [address]  [address] [facility] [address]     Phone:  [phone]     aspirin 81 MG EC tablet    lisinopril 20 MG tablet    lisinopril-hydrochlorothiazide 20-12.5 MG per tablet                Primary Care Provider Office Phone # Fax #    Priyanka Noel [phone] [address] [address]        Equal Access to Services     [facility]: Hadii aad ku hadasho Soomaali, waaxda luqadaha, qaybta kaalmada adeegyada, waxay saurav elam. So [facility] [phone].    ATENCIÓN: Si habla español, tiene a ruiz disposición servicios gratuitos de asistencia lingüística. Llame al [phone].    We comply with applicable federal civil rights laws and [place] laws. We do not discriminate on the  basis of race, color, national origin, age, disability, sex, sexual orientation, or gender identity.            Thank you!     Thank you for choosing [facility]  for your care. Our goal is always to provide you with excellent care. Hearing back from our patients is one way we can continue to improve our services. Please take a few minutes to complete the written survey that you may receive in the mail after your visit with us. Thank you!             Your Updated Medication List - Protect others around you: Learn how to safely use, store and throw away your medicines at www.disposemymeds.org.          This list is accurate as of 6/4/18 10:41 AM.  Always use your most recent med list.                   Brand Name Dispense Instructions for use Diagnosis    amLODIPine 5 MG tablet    NORVASC    90 tablet    TAKE 1 TABLET EVERY DAY    Essential hypertension with goal blood pressure less than 140/90       aspirin 81 MG EC tablet     90 tablet    Take 1 tablet (81 mg) by mouth daily    Essential hypertension with goal blood pressure less than 140/90       augmented betamethasone dipropionate 0.05 % cream    DIPROLENE-AF    50 g    Apply sparingly to affected area twice daily as needed.  Do not apply to face.    Atopic dermatitis, unspecified type       levothyroxine 88 MCG tablet    SYNTHROID/LEVOTHROID    90 tablet    TAKE 1 TABLET EVERY DAY    Hypothyroidism, unspecified type       lisinopril 20 MG tablet    PRINIVIL/ZESTRIL    90 tablet    TAKE 1 TABLET EVERY DAY IN ADDITION TO LISINOPRIL/HCTZ 20-12.5 MG.    Essential hypertension with goal blood pressure less than 140/90       lisinopril-hydrochlorothiazide 20-12.5 MG per tablet    PRINZIDE/ZESTORETIC    90 tablet    TAKE 1 TABLET EVERY DAY IN ADDITION TO LISINOPRIL 20 MG DAILY.    Essential hypertension with goal blood pressure less than 140/90       metoprolol tartrate 25 MG tablet    LOPRESSOR    180 tablet    TAKE 1 TABLET TWICE DAILY    Essential  hypertension with goal blood pressure less than 140/90

## 2018-07-18 NOTE — TELEPHONE ENCOUNTER
Updated patient that this was sent yesterday.  Patient explained that he went to the pharmacy and asked them to help him find it OTC and was told that he needed a prescription.  He did not provider the pharmacy with his information so they were not aware that he had a prescription there.  Advised him to contact the pharmacy for his medication and provide his name/info so they can look him up in their system   He agreed    Checked with the pharmacy and they did confirm that they have the prescription there    Sharri Botello RN     Hypercholesteremia    Hypertension    Obesity (BMI 30-39.9)    Primary osteoarthritis of left knee

## 2018-08-27 DIAGNOSIS — I10 ESSENTIAL HYPERTENSION WITH GOAL BLOOD PRESSURE LESS THAN 140/90: ICD-10-CM

## 2018-08-27 DIAGNOSIS — E03.9 HYPOTHYROIDISM, UNSPECIFIED TYPE: ICD-10-CM

## 2018-08-29 RX ORDER — LEVOTHYROXINE SODIUM 88 UG/1
TABLET ORAL
Qty: 90 TABLET | Refills: 0 | Status: SHIPPED | OUTPATIENT
Start: 2018-08-29 | End: 2018-12-05

## 2018-08-29 RX ORDER — AMLODIPINE BESYLATE 5 MG/1
TABLET ORAL
Qty: 90 TABLET | Refills: 2 | Status: SHIPPED | OUTPATIENT
Start: 2018-08-29 | End: 2019-06-05

## 2018-10-24 DIAGNOSIS — I10 ESSENTIAL HYPERTENSION WITH GOAL BLOOD PRESSURE LESS THAN 140/90: ICD-10-CM

## 2018-10-25 RX ORDER — LISINOPRIL AND HYDROCHLOROTHIAZIDE 12.5; 2 MG/1; MG/1
TABLET ORAL
Qty: 90 TABLET | Refills: 1 | Status: SHIPPED | OUTPATIENT
Start: 2018-10-25 | End: 2019-03-13

## 2018-10-25 RX ORDER — LISINOPRIL 20 MG/1
TABLET ORAL
Qty: 90 TABLET | Refills: 1 | Status: SHIPPED | OUTPATIENT
Start: 2018-10-25 | End: 2019-03-13

## 2018-11-30 DIAGNOSIS — I10 ESSENTIAL HYPERTENSION WITH GOAL BLOOD PRESSURE LESS THAN 140/90: ICD-10-CM

## 2018-11-30 RX ORDER — METOPROLOL TARTRATE 25 MG/1
TABLET, FILM COATED ORAL
Qty: 180 TABLET | Refills: 1 | Status: SHIPPED | OUTPATIENT
Start: 2018-11-30 | End: 2019-06-05

## 2018-12-05 ENCOUNTER — OFFICE VISIT (OUTPATIENT)
Dept: FAMILY MEDICINE | Facility: CLINIC | Age: 83
End: 2018-12-05
Payer: COMMERCIAL

## 2018-12-05 VITALS
BODY MASS INDEX: 22.12 KG/M2 | WEIGHT: 158 LBS | HEIGHT: 71 IN | SYSTOLIC BLOOD PRESSURE: 126 MMHG | RESPIRATION RATE: 22 BRPM | HEART RATE: 85 BPM | TEMPERATURE: 96.7 F | OXYGEN SATURATION: 94 % | DIASTOLIC BLOOD PRESSURE: 72 MMHG

## 2018-12-05 DIAGNOSIS — C61 PROSTATE CANCER (H): ICD-10-CM

## 2018-12-05 DIAGNOSIS — E03.9 HYPOTHYROIDISM, UNSPECIFIED TYPE: ICD-10-CM

## 2018-12-05 DIAGNOSIS — Z23 NEED FOR PROPHYLACTIC VACCINATION AND INOCULATION AGAINST INFLUENZA: ICD-10-CM

## 2018-12-05 DIAGNOSIS — I10 HYPERTENSION GOAL BP (BLOOD PRESSURE) < 140/90: ICD-10-CM

## 2018-12-05 DIAGNOSIS — E87.1 HYPONATREMIA: ICD-10-CM

## 2018-12-05 DIAGNOSIS — L20.9 ATOPIC DERMATITIS, UNSPECIFIED TYPE: Primary | ICD-10-CM

## 2018-12-05 LAB
ANION GAP SERPL CALCULATED.3IONS-SCNC: 9 MMOL/L (ref 3–14)
BUN SERPL-MCNC: 26 MG/DL (ref 7–30)
CALCIUM SERPL-MCNC: 8.9 MG/DL (ref 8.5–10.1)
CHLORIDE SERPL-SCNC: 95 MMOL/L (ref 94–109)
CO2 SERPL-SCNC: 29 MMOL/L (ref 20–32)
CREAT SERPL-MCNC: 1.09 MG/DL (ref 0.66–1.25)
GFR SERPL CREATININE-BSD FRML MDRD: 63 ML/MIN/1.7M2
GLUCOSE SERPL-MCNC: 95 MG/DL (ref 70–99)
HGB BLD-MCNC: 12.6 G/DL (ref 13.3–17.7)
POTASSIUM SERPL-SCNC: 4.1 MMOL/L (ref 3.4–5.3)
PSA SERPL-MCNC: 3.23 UG/L (ref 0–4)
SODIUM SERPL-SCNC: 133 MMOL/L (ref 133–144)
TSH SERPL DL<=0.005 MIU/L-ACNC: 3.64 MU/L (ref 0.4–4)

## 2018-12-05 PROCEDURE — 99214 OFFICE O/P EST MOD 30 MIN: CPT | Mod: 25 | Performed by: NURSE PRACTITIONER

## 2018-12-05 PROCEDURE — 84443 ASSAY THYROID STIM HORMONE: CPT | Performed by: NURSE PRACTITIONER

## 2018-12-05 PROCEDURE — 85018 HEMOGLOBIN: CPT | Performed by: NURSE PRACTITIONER

## 2018-12-05 PROCEDURE — 90662 IIV NO PRSV INCREASED AG IM: CPT | Performed by: NURSE PRACTITIONER

## 2018-12-05 PROCEDURE — G0008 ADMIN INFLUENZA VIRUS VAC: HCPCS | Performed by: NURSE PRACTITIONER

## 2018-12-05 PROCEDURE — 80048 BASIC METABOLIC PNL TOTAL CA: CPT | Performed by: NURSE PRACTITIONER

## 2018-12-05 PROCEDURE — 36415 COLL VENOUS BLD VENIPUNCTURE: CPT | Performed by: NURSE PRACTITIONER

## 2018-12-05 PROCEDURE — 84153 ASSAY OF PSA TOTAL: CPT | Performed by: NURSE PRACTITIONER

## 2018-12-05 RX ORDER — LEVOTHYROXINE SODIUM 88 UG/1
TABLET ORAL
Qty: 90 TABLET | Refills: 3 | Status: SHIPPED | OUTPATIENT
Start: 2018-12-05 | End: 2019-06-05

## 2018-12-05 RX ORDER — BETAMETHASONE DIPROPIONATE 0.5 MG/G
CREAM TOPICAL
Qty: 50 G | Refills: 2 | Status: SHIPPED | OUTPATIENT
Start: 2018-12-05 | End: 2020-01-06

## 2018-12-05 ASSESSMENT — PAIN SCALES - GENERAL: PAINLEVEL: NO PAIN (0)

## 2018-12-05 NOTE — MR AVS SNAPSHOT
After Visit Summary   12/5/2018    Parish Driscoll    MRN: [id number]           Patient Information     Date Of Birth          10/10/1926        Visit Information        Provider Department      12/5/2018 10:00 AM Inés Aragon APRN [facility]        Today's Diagnoses     Atopic dermatitis, unspecified type    -  1    Hypothyroidism, unspecified type        Hyponatremia        Prostate cancer (H)        Hypertension goal BP (blood pressure) < 140/90        Need for prophylactic vaccination and inoculation against influenza          Care Instructions    Consider getting the Shingrix vaccine to prevent shingles.  Please check with your insurance to see if this is best covered at the pharmacy or at a clinic visit.  You can walk into any pharmacy and get the shot without a prescription.  You may also schedule a nurse only visit to have the shot given at the clinic.    [facility]    If you have any questions regarding to your visit please contact your care team:     Team Pink:   Clinic Hours Telephone Number   Internal Medicine:  Dr. Priyanka Aragon, NP 7am-7pm  Monday - Thursday   7am-5pm  Fridays  [phone]  (Appointment scheduling available 24/7)   Urgent Care - [place] and [place] - 11am-9pm Monday-Friday Saturday-Sunday- 9am-5pm   [place] - 5pm-9pm Monday-Friday Saturday-Sunday- 9am-5pm  [phone] - [place]  [phone] - [place]       What options do I have for a visit other than an office visit? We offer electronic visits (e-visits) and telephone visits, when medically appropriate.  Please check with your medical insurance to see if these types of visits are covered, as you will be responsible for any charges that are not paid by your insurance.      You can use Clean[employer]ate (secure electronic communication) to access to your chart, send your primary care provider a message, or make an  "appointment. Ask a team member how to get started.     For a price quote for your services, please call our Consumer Price Line at [phone] or our Imaging Cost estimation line at [phone] (for imaging tests).            Follow-ups after your visit        Follow-up notes from your care team     Return in about 6 months (around 6/5/2019) for Medication Recheck.      Who to contact     If you have questions or need follow up information about today's clinic visit or your schedule please contact [facility] directly at [phone].  Normal or non-critical lab and imaging results will be communicated to you by MyChart, letter or phone within 4 business days after the clinic has received the results. If you do not hear from us within 7 days, please contact the clinic through MyChart or phone. If you have a critical or abnormal lab result, we will notify you by phone as soon as possible.  Submit refill requests through [employer] or call your pharmacy and they will forward the refill request to us. Please allow 3 business days for your refill to be completed.          Additional Information About Your Visit        Care EveryWhere ID     This is your Care EveryWhere ID. This could be used by other organizations to access your [place] medical records  [id number]        Your Vitals Were     Pulse Temperature Respirations Height Pulse Oximetry BMI (Body Mass Index)    85 96.7  F (35.9  C) (Oral) 22 5' 11\" (1.803 m) 94% 22.04 kg/m2       Blood Pressure from Last 3 Encounters:   12/05/18 126/72   06/04/18 120/60   12/06/17 134/62    Weight from Last 3 Encounters:   12/05/18 158 lb (71.7 kg)   06/04/18 159 lb 6.4 oz (72.3 kg)   11/24/17 157 lb 9.6 oz (71.5 kg)              We Performed the Following     Basic metabolic panel     Hemoglobin     PSA, tumor marker     TSH WITH FREE T4 REFLEX          Where to get your medicines      These medications were sent to [id number] - [place], [place] - [address]  [address] [address]     Phone:  [phone]     augmented betamethasone dipropionate 0.05 % external cream    levothyroxine 88 MCG tablet          Primary Care Provider Office Phone # Fax #    Priyanka Noel [phone] [address] [address]        Equal Access to Services     [facility] : Hadii aad ku hadasho Soomaali, waaxda luqadaha, qaybta kaalmada adeegyada, waxay idiin hayaan adeeg kharash la'aan . So [facility] [phone].    ATENCIÓN: Si habla español, tiene a ruiz disposición servicios gratuitos de asistencia lingüística. Llame al [phone].    We comply with applicable federal civil rights laws and [place] laws. We do not discriminate on the basis of race, color, national origin, age, disability, sex, sexual orientation, or gender identity.            Thank you!     Thank you for choosing [facility]  for your care. Our goal is always to provide you with excellent care. Hearing back from our patients is one way we can continue to improve our services. Please take a few minutes to complete the written survey that you may receive in the mail after your visit with us. Thank you!             Your Updated Medication List - Protect others around you: Learn how to safely use, store and throw away your medicines at www.disposemymeds.org.          This list is accurate as of 12/5/18 10:24 AM.  Always use your most recent med list.                   Brand Name Dispense Instructions for use Diagnosis    amLODIPine 5 MG tablet    NORVASC    90 tablet    TAKE 1 TABLET EVERY DAY    Essential hypertension with goal blood pressure less than 140/90       aspirin 81 MG EC tablet    ASA    90 tablet    Take 1 tablet (81 mg) by mouth daily    Essential hypertension with goal blood pressure less than 140/90       augmented betamethasone dipropionate 0.05 % external cream    DIPROLENE-AF    50 g    Apply sparingly to  affected area twice daily as needed.  Do not apply to face.    Atopic dermatitis, unspecified type       levothyroxine 88 MCG tablet    SYNTHROID/LEVOTHROID    90 tablet    TAKE 1 TABLET EVERY DAY    Hypothyroidism, unspecified type       lisinopril 20 MG tablet    PRINIVIL/ZESTRIL    90 tablet    TAKE 1 TABLET EVERY DAY IN ADDITION TO LISINOPRIL/HCTZ 20-12.5 MG.    Essential hypertension with goal blood pressure less than 140/90       lisinopril-hydrochlorothiazide 20-12.5 MG tablet    PRINZIDE/ZESTORETIC    90 tablet    TAKE 1 TABLET EVERY DAY IN ADDITION TO LISINOPRIL 20 MG DAILY.    Essential hypertension with goal blood pressure less than 140/90       metoprolol tartrate 25 MG tablet    LOPRESSOR    180 tablet    TAKE 1 TABLET TWICE DAILY    Essential hypertension with goal blood pressure less than 140/90

## 2018-12-05 NOTE — PROGRESS NOTES
SUBJECTIVE:   Parish Driscoll is a 89 year old male who presents to clinic today for the following health issues:      Medication Followup     Taking Medication as prescribed: yes    Side Effects:  None    Medication Helping Symptoms:  yes       Hypertension Follow-up      Outpatient blood pressures are not being checked.    Low Salt Diet: no added salt    Hypothyroidism Follow-up      Since last visit, patient describes the following symptoms: Weight stable, no hair loss, no skin changes, no constipation, no loose stools      Problem list and histories reviewed & adjusted, as indicated.  Additional history: as documented    Patient Active Problem List   Diagnosis     Hypothyroidism     Advanced directives, counseling/discussion     Hypertension goal BP (blood pressure) < 140/90     Hyperlipidemia with target LDL less than 130     Prostate cancer (H)     Hyponatremia     Edema     Right thigh pain     Inflammation of joint of right knee     Past Surgical History:   Procedure Laterality Date     CYSTECTOMY BRANCHIAL CLEFT  11/2015     CYSTOSCOPY  06/2003     TONSILLECTOMY      at age 12       Social History   Substance Use Topics     Smoking status: Former Smoker     Years: 15.00     Types: Cigarettes     Quit date: 1/1/1960     Smokeless tobacco: Never Used     Alcohol use No     Family History   Problem Relation Age of Onset     Hypertension Mother      Hypertension Father      Depression Father      Asthma Sister      Depression Sister      Diabetes Maternal Grandmother      Cancer Maternal Grandfather      Stomach     Heart Disease No family hx of          Current Outpatient Prescriptions   Medication Sig Dispense Refill     amLODIPine (NORVASC) 5 MG tablet TAKE 1 TABLET EVERY DAY 90 tablet 2     aspirin 81 MG EC tablet Take 1 tablet (81 mg) by mouth daily 90 tablet 3     augmented betamethasone dipropionate (DIPROLENE-AF) 0.05 % external cream Apply sparingly to affected area twice daily as needed.  Do not  "apply to face. 50 g 2     levothyroxine (SYNTHROID/LEVOTHROID) 88 MCG tablet TAKE 1 TABLET EVERY DAY 90 tablet 3     lisinopril (PRINIVIL/ZESTRIL) 20 MG tablet TAKE 1 TABLET EVERY DAY IN ADDITION TO LISINOPRIL/HCTZ 20-12.5 MG. 90 tablet 1     lisinopril-hydrochlorothiazide (PRINZIDE/ZESTORETIC) 20-12.5 MG per tablet TAKE 1 TABLET EVERY DAY IN ADDITION TO LISINOPRIL 20 MG DAILY. 90 tablet 1     metoprolol tartrate (LOPRESSOR) 25 MG tablet TAKE 1 TABLET TWICE DAILY 180 tablet 1     [DISCONTINUED] levothyroxine (SYNTHROID/LEVOTHROID) 88 MCG tablet TAKE 1 TABLET EVERY DAY 90 tablet 0     Allergies   Allergen Reactions     Mold      Nkda [No Known Drug Allergies]      Seasonal Allergies Other (See Comments)     Runny nose     BP Readings from Last 3 Encounters:   12/05/18 126/72   06/04/18 120/60   12/06/17 134/62    Wt Readings from Last 3 Encounters:   12/05/18 158 lb (71.7 kg)   06/04/18 159 lb 6.4 oz (72.3 kg)   11/24/17 157 lb 9.6 oz (71.5 kg)                  Labs reviewed in EPIC    Reviewed and updated as needed this visit by clinical staff       Reviewed and updated as needed this visit by Provider         ROS:  Constitutional, HEENT, cardiovascular, pulmonary, gi and gu systems are negative, except as otherwise noted.    OBJECTIVE:     /72  Pulse 85  Temp 96.7  F (35.9  C) (Oral)  Resp 22  Ht 5' 11\" (1.803 m)  Wt 158 lb (71.7 kg)  SpO2 94%  BMI 22.04 kg/m2  Body mass index is 22.04 kg/(m^2).  GENERAL: healthy, alert and no distress  EYES: Eyes grossly normal to inspection, PERRL and conjunctivae and sclerae normal  HENT: ear canals and TM's normal, nose and mouth without ulcers or lesions  NECK: no adenopathy, no asymmetry, masses, or scars and thyroid normal to palpation  RESP: lungs clear to auscultation - no rales, rhonchi or wheezes  CV: regular rate and rhythm, normal S1 S2, no S3 or S4, no murmur, click or rub, no peripheral edema and peripheral pulses strong  MS: no gross musculoskeletal " defects noted, no edema    Diagnostic Test Results:  pending    ASSESSMENT/PLAN:     1. Atopic dermatitis, unspecified type  Refill requested.  - augmented betamethasone dipropionate (DIPROLENE-AF) 0.05 % external cream; Apply sparingly to affected area twice daily as needed.  Do not apply to face.  Dispense: 50 g; Refill: 2    2. Hypothyroidism, unspecified type  Stable.  Continue current treatment plan and medications.    - TSH WITH FREE T4 REFLEX  - levothyroxine (SYNTHROID/LEVOTHROID) 88 MCG tablet; TAKE 1 TABLET EVERY DAY  Dispense: 90 tablet; Refill: 3    3. Hyponatremia  Recheck today.    4. Prostate cancer (H)  No new symptoms.  - PSA, tumor marker    5. Hypertension goal BP (blood pressure) < 140/90  Stable.  Continue current treatment plan and medications.    - Basic metabolic panel  - Hemoglobin    6. Need for prophylactic vaccination and inoculation against influenza    - FLU VACCINE, INCREASED ANTIGEN, PRESV FREE, AGE 65+ [18253]  - Vaccine Administration, Initial [82261]        FUTURE APPOINTMENTS:       - Follow-up visit in 6 months.    CHELI Hardwick [facility]

## 2018-12-05 NOTE — LETTER
[facility]  [address] [place] [address]. [place] [address]    December 6, 2018    Parish Driscoll  [address] [address]    Dear Parish,    Your labs are stable.     If you have any questions please feel free to contact [phone] or myself via [email or web address].     Enclosed is a copy of your results.     Results for orders placed or performed in visit on 12/05/18   TSH WITH FREE T4 REFLEX   Result Value Ref Range    TSH 3.64 0.40 - 4.00 mU/L   Basic metabolic panel   Result Value Ref Range    Sodium 133 133 - 144 mmol/L    Potassium 4.1 3.4 - 5.3 mmol/L    Chloride 95 94 - 109 mmol/L    Carbon Dioxide 29 20 - 32 mmol/L    Anion Gap 9 3 - 14 mmol/L    Glucose 95 70 - 99 mg/dL    Urea Nitrogen 26 7 - 30 mg/dL    Creatinine 1.09 0.66 - 1.25 mg/dL    GFR Estimate 63 >60 mL/min/1.7m2    GFR Estimate If Black 77 >60 mL/min/1.7m2    Calcium 8.9 8.5 - 10.1 mg/dL   Hemoglobin   Result Value Ref Range    Hemoglobin 12.6 (L) 13.3 - 17.7 g/dL   PSA, tumor marker   Result Value Ref Range    PSA 3.23 0 - 4 ug/L   If you have any questions or concerns, please call myself or my nurse at [phone].      Sincerely,      Inés Aragon CNP/garcia

## 2018-12-05 NOTE — PROGRESS NOTES
Dear Parish,    Your recent test results are attached.      Your labs are stable.    If you have any questions please feel free to contact [phone] or myself via [email or web address].    Sincerely,  Inés Aragon, CNP

## 2018-12-05 NOTE — PROGRESS NOTES

## 2018-12-05 NOTE — PATIENT INSTRUCTIONS
Consider getting the Shingrix vaccine to prevent shingles.  Please check with your insurance to see if this is best covered at the pharmacy or at a clinic visit.  You can walk into any pharmacy and get the shot without a prescription.  You may also schedule a nurse only visit to have the shot given at the clinic.    [facility]    If you have any questions regarding to your visit please contact your care team:     Team Pink:   Clinic Hours Telephone Number   Internal Medicine:  Dr. Priyanka Aragon, NP 7am-7pm  Monday - Thursday   7am-5pm  Fridays  [phone]  (Appointment scheduling available 24/7)   Urgent Care - [place] and [facility] - 11am-9pm Monday-Friday Saturday-Sunday- 9am-5pm   [place] - 5pm-9pm Monday-Friday Saturday-Sunday- 9am-5pm  [phone] - [place]  [phone] - [place]       What options do I have for a visit other than an office visit? We offer electronic visits (e-visits) and telephone visits, when medically appropriate.  Please check with your medical insurance to see if these types of visits are covered, as you will be responsible for any charges that are not paid by your insurance.      You can use Gnzo (secure electronic communication) to access to your chart, send your primary care provider a message, or make an appointment. Ask a team member how to get started.     For a price quote for your services, please call our Consumer Price Line at [phone] or our Imaging Cost estimation line at [phone] (for imaging tests).     [facility]

## 2019-01-04 ENCOUNTER — TRANSFERRED RECORDS (OUTPATIENT)
Dept: HEALTH INFORMATION MANAGEMENT | Facility: CLINIC | Age: 84
End: 2019-01-04

## 2019-03-13 DIAGNOSIS — I10 ESSENTIAL HYPERTENSION WITH GOAL BLOOD PRESSURE LESS THAN 140/90: ICD-10-CM

## 2019-03-15 RX ORDER — LISINOPRIL 20 MG/1
TABLET ORAL
Qty: 90 TABLET | Refills: 0 | Status: SHIPPED | OUTPATIENT
Start: 2019-03-15 | End: 2019-06-05

## 2019-03-15 RX ORDER — LISINOPRIL AND HYDROCHLOROTHIAZIDE 12.5; 2 MG/1; MG/1
TABLET ORAL
Qty: 90 TABLET | Refills: 0 | Status: SHIPPED | OUTPATIENT
Start: 2019-03-15 | End: 2019-06-05

## 2019-04-02 ENCOUNTER — OFFICE VISIT (OUTPATIENT)
Dept: FAMILY MEDICINE | Facility: CLINIC | Age: 84
End: 2019-04-02
Payer: COMMERCIAL

## 2019-04-02 ENCOUNTER — ANCILLARY PROCEDURE (OUTPATIENT)
Dept: GENERAL RADIOLOGY | Facility: CLINIC | Age: 84
End: 2019-04-02
Attending: NURSE PRACTITIONER
Payer: COMMERCIAL

## 2019-04-02 VITALS
SYSTOLIC BLOOD PRESSURE: 118 MMHG | OXYGEN SATURATION: 97 % | TEMPERATURE: 97 F | WEIGHT: 157 LBS | BODY MASS INDEX: 21.9 KG/M2 | RESPIRATION RATE: 14 BRPM | DIASTOLIC BLOOD PRESSURE: 70 MMHG | HEART RATE: 63 BPM
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
## 2019-04-02 DIAGNOSIS — M54.50 ACUTE MIDLINE LOW BACK PAIN WITHOUT SCIATICA: ICD-10-CM

## 2019-04-02 PROCEDURE — 72100 X-RAY EXAM L-S SPINE 2/3 VWS: CPT

## 2019-04-02 PROCEDURE — 99213 OFFICE O/P EST LOW 20 MIN: CPT | Performed by: NURSE PRACTITIONER

## 2019-04-02 ASSESSMENT — PAIN SCALES - GENERAL: PAINLEVEL: NO PAIN (0)

## 2019-04-02 NOTE — PROGRESS NOTES
SUBJECTIVE:   Parish Driscoll is a 89 year old male who presents to clinic today for the following health issues:      Musculoskeletal problem/pain      Duration: 2 weeks    Description  Location: lower back pain    Intensity:  moderate    Accompanying signs and symptoms: none    History  Previous similar problem: no   Previous evaluation:  none    Precipitating or alleviating factors:  Trauma or overuse: YES- slipped on ice two weeks ago  Aggravating factors include: getting up sitting or laying position    Therapies tried and outcome: pain cream but did not help    Patient did not hit his head.  He notes pain with position changes.  Patient denies saddle anesthesia, fever, bowel/bladder dysfunction, leg weakness.      Problem list and histories reviewed & adjusted, as indicated.  Additional history: as documented    Patient Active Problem List   Diagnosis     Hypothyroidism     Advanced directives, counseling/discussion     Hypertension goal BP (blood pressure) < 140/90     Hyperlipidemia with target LDL less than 130     Prostate cancer (H)     Hyponatremia     Edema     Right thigh pain     Inflammation of joint of right knee     Past Surgical History:   Procedure Laterality Date     CYSTECTOMY BRANCHIAL CLEFT  2015     CYSTOSCOPY  2003     TONSILLECTOMY      at age 12       Social History     Tobacco Use     Smoking status: Former Smoker     Years: 15.00     Types: Cigarettes     Last attempt to quit: 1960     Years since quittin.2     Smokeless tobacco: Never Used   Substance Use Topics     Alcohol use: No     Family History   Problem Relation Age of Onset     Hypertension Mother      Hypertension Father      Depression Father      Asthma Sister      Depression Sister      Diabetes Maternal Grandmother      Cancer Maternal Grandfather         Stomach     Heart Disease No family hx of          Current Outpatient Medications   Medication Sig Dispense Refill     amLODIPine (NORVASC) 5 MG tablet  TAKE 1 TABLET EVERY DAY 90 tablet 2     aspirin 81 MG EC tablet Take 1 tablet (81 mg) by mouth daily 90 tablet 3     augmented betamethasone dipropionate (DIPROLENE-AF) 0.05 % external cream Apply sparingly to affected area twice daily as needed.  Do not apply to face. 50 g 2     levothyroxine (SYNTHROID/LEVOTHROID) 88 MCG tablet TAKE 1 TABLET EVERY DAY 90 tablet 3     lisinopril (PRINIVIL/ZESTRIL) 20 MG tablet TAKE 1 TABLET EVERY DAY IN ADDITION TO    LISINOPRIL HCTZ 20/12.5 MG. 90 tablet 0     lisinopril-hydrochlorothiazide (PRINZIDE/ZESTORETIC) 20-12.5 MG tablet TAKE 1 TABLET EVERY DAY IN ADDITION TO LISINOPRIL 20 MG DAILY. 90 tablet 0     metoprolol tartrate (LOPRESSOR) 25 MG tablet TAKE 1 TABLET TWICE DAILY 180 tablet 1     Allergies   Allergen Reactions     Mold      Nkda [No Known Drug Allergies]      Seasonal Allergies Other (See Comments)     Runny nose     BP Readings from Last 3 Encounters:   04/02/19 118/70   12/05/18 126/72   06/04/18 120/60    Wt Readings from Last 3 Encounters:   04/02/19 71.2 kg (157 lb)   12/05/18 71.7 kg (158 lb)   06/04/18 72.3 kg (159 lb 6.4 oz)                  Labs reviewed in EPIC    Reviewed and updated as needed this visit by clinical staff  Allergies  Meds       Reviewed and updated as needed this visit by Provider         ROS:  Constitutional, HEENT, cardiovascular, pulmonary, gi and gu systems are negative, except as otherwise noted.    OBJECTIVE:     /70   Pulse 63   Temp 97  F (36.1  C) (Oral)   Resp 14   Wt 71.2 kg (157 lb)   SpO2 97%   BMI 21.90 kg/m    Body mass index is 21.9 kg/m .  GENERAL: healthy, alert and no distress  RESP: lungs clear to auscultation - no rales, rhonchi or wheezes  CV: regular rate and rhythm, normal S1 S2, no S3 or S4, no murmur, click or rub, no peripheral edema and peripheral pulses strong  MS: no gross musculoskeletal defects noted, no edema  Comprehensive back pain exam:  No tenderness, Pain limits the following motions:  extension, Lower extremity strength functional and equal on both sides, Lower extremity sensation normal and equal on both sides and Straight leg raise negative bilaterally    Diagnostic Test Results:  none     ASSESSMENT/PLAN:     1. Fall due to slipping on ice or snow, initial encounter      2. Acute midline low back pain without sciatica  Patient to use tylenol for pain.  Consider physical therapy pending x-ray results.  - XR Lumbar Spine 2/3 Views; Future    FUTURE APPOINTMENTS:       - Follow-up visit in 2 months.    CHELI Hardwick CNP  [facility]

## 2019-06-05 ENCOUNTER — OFFICE VISIT (OUTPATIENT)
Dept: INTERNAL MEDICINE | Facility: CLINIC | Age: 84
End: 2019-06-05
Payer: COMMERCIAL

## 2019-06-05 VITALS
RESPIRATION RATE: 14 BRPM | SYSTOLIC BLOOD PRESSURE: 118 MMHG | HEIGHT: 69 IN | BODY MASS INDEX: 21.89 KG/M2 | OXYGEN SATURATION: 98 % | DIASTOLIC BLOOD PRESSURE: 74 MMHG | WEIGHT: 147.8 LBS | TEMPERATURE: 98.2 F | HEART RATE: 59 BPM

## 2019-06-05 DIAGNOSIS — E43 SEVERE PROTEIN-CALORIE MALNUTRITION (H): ICD-10-CM

## 2019-06-05 DIAGNOSIS — S32.030A CLOSED COMPRESSION FRACTURE OF THIRD LUMBAR VERTEBRA, INITIAL ENCOUNTER: ICD-10-CM

## 2019-06-05 DIAGNOSIS — C61 PROSTATE CANCER (H): ICD-10-CM

## 2019-06-05 DIAGNOSIS — M54.50 LUMBAR PAIN: Primary | ICD-10-CM

## 2019-06-05 DIAGNOSIS — R63.4 UNINTENTIONAL WEIGHT LOSS: ICD-10-CM

## 2019-06-05 DIAGNOSIS — K59.04 CHRONIC IDIOPATHIC CONSTIPATION: ICD-10-CM

## 2019-06-05 DIAGNOSIS — R39.15 URINARY URGENCY: ICD-10-CM

## 2019-06-05 DIAGNOSIS — E03.9 HYPOTHYROIDISM, UNSPECIFIED TYPE: ICD-10-CM

## 2019-06-05 DIAGNOSIS — I10 ESSENTIAL HYPERTENSION WITH GOAL BLOOD PRESSURE LESS THAN 140/90: ICD-10-CM

## 2019-06-05 DIAGNOSIS — S32.040G CLOSED COMPRESSION FRACTURE OF L4 LUMBAR VERTEBRA WITH DELAYED HEALING, SUBSEQUENT ENCOUNTER: ICD-10-CM

## 2019-06-05 LAB
ALBUMIN UR-MCNC: NEGATIVE MG/DL
APPEARANCE UR: CLEAR
BILIRUB UR QL STRIP: NEGATIVE
COLOR UR AUTO: YELLOW
GLUCOSE UR STRIP-MCNC: NEGATIVE MG/DL
HGB UR QL STRIP: NEGATIVE
KETONES UR STRIP-MCNC: NEGATIVE MG/DL
LEUKOCYTE ESTERASE UR QL STRIP: NEGATIVE
NITRATE UR QL: NEGATIVE
PH UR STRIP: 7.5 PH (ref 5–7)
SOURCE: ABNORMAL
SP GR UR STRIP: 1.01 (ref 1–1.03)
UROBILINOGEN UR STRIP-ACNC: 1 EU/DL (ref 0.2–1)

## 2019-06-05 PROCEDURE — 99214 OFFICE O/P EST MOD 30 MIN: CPT | Performed by: INTERNAL MEDICINE

## 2019-06-05 PROCEDURE — 81003 URINALYSIS AUTO W/O SCOPE: CPT | Performed by: INTERNAL MEDICINE

## 2019-06-05 RX ORDER — AMLODIPINE BESYLATE 5 MG/1
TABLET ORAL
Qty: 90 TABLET | Refills: 3 | Status: SHIPPED | OUTPATIENT
Start: 2019-06-05 | End: 2020-04-19

## 2019-06-05 RX ORDER — LEVOTHYROXINE SODIUM 88 UG/1
TABLET ORAL
Qty: 90 TABLET | Refills: 3 | Status: SHIPPED | OUTPATIENT
Start: 2019-06-05 | End: 2020-03-10 | Stop reason: DRUGHIGH

## 2019-06-05 RX ORDER — LISINOPRIL AND HYDROCHLOROTHIAZIDE 12.5; 2 MG/1; MG/1
TABLET ORAL
Qty: 90 TABLET | Refills: 3 | Status: SHIPPED | OUTPATIENT
Start: 2019-06-05 | End: 2019-11-14 | Stop reason: ALTCHOICE

## 2019-06-05 RX ORDER — LISINOPRIL 20 MG/1
TABLET ORAL
Qty: 90 TABLET | Refills: 3 | Status: SHIPPED | OUTPATIENT
Start: 2019-06-05 | End: 2019-11-14

## 2019-06-05 RX ORDER — METOPROLOL TARTRATE 25 MG/1
25 TABLET, FILM COATED ORAL 2 TIMES DAILY
Qty: 180 TABLET | Refills: 3 | Status: SHIPPED | OUTPATIENT
Start: 2019-06-05 | End: 2020-04-19

## 2019-06-05 ASSESSMENT — MIFFLIN-ST. JEOR: SCORE: 1315.41

## 2019-06-05 ASSESSMENT — PAIN SCALES - GENERAL: PAINLEVEL: MILD PAIN (3)

## 2019-06-05 NOTE — PATIENT INSTRUCTIONS
Consider Lidocaine patches on your back and physical therapy for your back if you continue to have problems.      Try a glass of prune juice daily for your bowel movements.     Schedule an MRI of your back.

## 2019-06-05 NOTE — LETTER
[facility]  [address] [place] [address]    June 6, 2019    Parish Driscoll  [address] [address]        Dear Parish,    Roselyn bladder infection.     Enclosed is a copy of your results.     Results for orders placed or performed in visit on 06/05/19   *UA reflex to Microscopic and Culture ([place] and [facility] (except [place] and [place])   Result Value Ref Range    Color Urine Yellow     Appearance Urine Clear     Glucose Urine Negative NEG^Negative mg/dL    Bilirubin Urine Negative NEG^Negative    Ketones Urine Negative NEG^Negative mg/dL    Specific Gravity Urine 1.010 1.003 - 1.035    Blood Urine Negative NEG^Negative    pH Urine 7.5 (H) 5.0 - 7.0 pH    Protein Albumin Urine Negative NEG^Negative mg/dL    Urobilinogen Urine 1.0 0.2 - 1.0 EU/dL    Nitrite Urine Negative NEG^Negative    Leukocyte Esterase Urine Negative NEG^Negative    Source Midstream Urine        If you have any questions or concerns, please call myself or my nurse at [phone].      Sincerely,        Priyanka Noel MD/radha

## 2019-06-05 NOTE — PROGRESS NOTES
Subjective     Parish Driscoll is a 89 year old male who presents to clinic today for the following health issues:    History of Present Illness     Hyperlipidemia:  He presents for follow up of hyperlipidemia.  He reports shortness of breath (Patient states he gets SOB occasionally ). He is not taking medication to lower cholesterol. He is not having myalgia or other side effects to statin medications.    Hypertension: He presents for follow up of hypertension.  He does not check blood pressure  regularly outside of the clinic. Outpatient blood pressures have not been over 140/90. He follows a low salt diet.     Hypothyroidism:     Since last visit, patient describes the following symptoms::  Depression, Constipation and Fatigue    He eats 0-1 (Patient states he has a banana some days, orange juice every morning and some veggies but not much ) servings of fruits and vegetables daily.He consumes 1 (Patient states no soda at all and maybe a cup of juice a day ) sweetened beverage(s) daily.  He is taking medications regularly.     He isn't hungry and doesn't eat or drink enough.  He continues to have ongoing pain in the back and hurts when he carla.  This Is residual pain from his fall.  He hasn't had any falls since then.  He doesn't feel like his balance is off - his first slip was on the ice.  He continues to have pain in the left side.  He did have an xray that revealed an age indeterminate compression fracture of the L4 region.  He has seen a chiropractor and an osteopath in the past but not with this pain.  He is using pain relievers over the counter - tylenol.  It will get bad throughout the day.  Sitting makes it better. If he works and carla to  things, then it will worsen.    He has some constipation but doesn't take laxatives for this.  It is very irregular.  He has been this way since childhood.      He is up every 1-2 hours to urinate.  This has been since the fall. He has to get to the  bathroom or else there will be leakage but        Patient Active Problem List   Diagnosis     Hypothyroidism     Advanced directives, counseling/discussion     Essential hypertension with goal blood pressure less than 140/90     Hyperlipidemia with target LDL less than 130     Prostate cancer (H)     Hyponatremia     Edema     Right thigh pain     Inflammation of joint of right knee     Closed compression fracture of L4 lumbar vertebra with delayed healing, subsequent encounter     Chronic idiopathic constipation     Urinary urgency     Past Surgical History:   Procedure Laterality Date     CYSTECTOMY BRANCHIAL CLEFT  2015     CYSTOSCOPY  2003     TONSILLECTOMY      at age 12       Social History     Tobacco Use     Smoking status: Former Smoker     Years: 15.00     Types: Cigarettes     Last attempt to quit: 1960     Years since quittin.4     Smokeless tobacco: Never Used   Substance Use Topics     Alcohol use: No     Family History   Problem Relation Age of Onset     Hypertension Mother      Hypertension Father      Depression Father      Asthma Sister      Depression Sister      Diabetes Maternal Grandmother      Cancer Maternal Grandfather         Stomach     Heart Disease No family hx of          Current Outpatient Medications   Medication Sig Dispense Refill     amLODIPine (NORVASC) 5 MG tablet TAKE 1 TABLET EVERY DAY 90 tablet 3     aspirin 81 MG EC tablet Take 1 tablet (81 mg) by mouth daily 90 tablet 3     augmented betamethasone dipropionate (DIPROLENE-AF) 0.05 % external cream Apply sparingly to affected area twice daily as needed.  Do not apply to face. 50 g 2     levothyroxine (SYNTHROID/LEVOTHROID) 88 MCG tablet TAKE 1 TABLET EVERY DAY 90 tablet 3     lisinopril (PRINIVIL/ZESTRIL) 20 MG tablet TAKE 1 TABLET EVERY DAY IN ADDITION TO    LISINOPRIL HCTZ 20/12.5 MG. 90 tablet 3     lisinopril-hydrochlorothiazide (PRINZIDE/ZESTORETIC) 20-12.5 MG tablet TAKE 1 TABLET EVERY DAY IN ADDITION TO  "LISINOPRIL 20 MG DAILY. 90 tablet 3     metoprolol tartrate (LOPRESSOR) 25 MG tablet Take 1 tablet (25 mg) by mouth 2 times daily 180 tablet 3     Allergies   Allergen Reactions     Mold      Nkda [No Known Drug Allergies]      Seasonal Allergies Other (See Comments)     Runny nose       Reviewed and updated as needed this visit by Provider  Tobacco  Allergies  Meds  Problems  Med Hx  Surg Hx  Fam Hx         Review of Systems    ROS: 10 point ROS neg other than the symptoms noted above in the HPI.       Objective    /74 (BP Location: Left arm, Cuff Size: Adult Regular)   Pulse 59   Temp 98.2  F (36.8  C) (Oral)   Resp 14   Ht 1.76 m (5' 9.29\")   Wt 67 kg (147 lb 12.8 oz)   SpO2 98%   BMI 21.64 kg/m    Body mass index is 21.64 kg/m .  Physical Exam   GENERAL APPEARANCE: healthy, alert and no distress  NECK: no adenopathy, no asymmetry, masses, or scars and thyroid normal to palpation  RESP: lungs clear to auscultation - no rales, rhonchi or wheezes  CV: regular rates and rhythm and normal S1 S2, no S3 or S4  ABDOMEN:  soft, nontender, no HSM or masses and bowel sounds normal  SKIN: no suspicious lesions or rashes  PSYCH: mentation appears normal. and affect normal/bright  1+ ankle edema bilateral   No pain to palpation along the lumbar spine or the sacroiliac joints bilateral     Diagnostic Test Results:  Labs reviewed in Epic  Results for orders placed or performed in visit on 06/05/19   *UA reflex to Microscopic and Culture ([place] and [facility] (except [place] and [place])   Result Value Ref Range    Color Urine Yellow     Appearance Urine Clear     Glucose Urine Negative NEG^Negative mg/dL    Bilirubin Urine Negative NEG^Negative    Ketones Urine Negative NEG^Negative mg/dL    Specific Gravity Urine 1.010 1.003 - 1.035    Blood Urine Negative NEG^Negative    pH Urine 7.5 (H) 5.0 - 7.0 pH    Protein Albumin Urine Negative NEG^Negative mg/dL    Urobilinogen Urine 1.0 0.2 - 1.0 EU/dL    " Nitrite Urine Negative NEG^Negative    Leukocyte Esterase Urine Negative NEG^Negative    Source Midstream Urine            Assessment & Plan     1. Lumbar pain  Isn't much better since his initial fall and now he is experiencing urinary urgency and leakage that is new for him.  This is concerning given history of prostate cancer as well as his compression fracture.      2. Essential hypertension with goal blood pressure less than 140/90  Well controlled with medications without side effects.   - lisinopril-hydrochlorothiazide (PRINZIDE/ZESTORETIC) 20-12.5 MG tablet; TAKE 1 TABLET EVERY DAY IN ADDITION TO LISINOPRIL 20 MG DAILY. Dispense: 90 tablet; Refill: 3  - amLODIPine (NORVASC) 5 MG tablet; TAKE 1 TABLET EVERY DAY  Dispense: 90 tablet; Refill: 3  - lisinopril (PRINIVIL/ZESTRIL) 20 MG tablet; TAKE 1 TABLET EVERY DAY IN ADDITION TO    LISINOPRIL HCTZ 20/12.5 MG.  Dispense: 90 tablet; Refill: 3  - metoprolol tartrate (LOPRESSOR) 25 MG tablet; Take 1 tablet (25 mg) by mouth 2 times daily  Dispense: 180 tablet; Refill: 3  - Basic metabolic panel  (Ca, Cl, CO2, Creat, Gluc, K, Na, BUN); Future    3. Prostate cancer (H)  Recent PSA was normal within the last year     4. Hypothyroidism, unspecified type  Well controlled with medications without side effects.   - levothyroxine (SYNTHROID/LEVOTHROID) 88 MCG tablet; TAKE 1 TABLET EVERY DAY  Dispense: 90 tablet; Refill: 3    5. Unintentional weight loss  Patient reports lack of appetite which might be contributing     6. Urinary urgency  New for him since his back pain so needs further evaluation.    - *UA reflex to Microscopic and Culture ([place] and [facility] (except [place] and [place])    7. Closed compression fracture of L4 lumbar vertebra with delayed healing, subsequent encounter    - MR Lumbar Spine w/o Contrast; Future    8. Chronic idiopathic constipation  Per patient instructions.          Patient Instructions   Consider Lidocaine patches on your back  and physical therapy for your back if you continue to have problems.      Try a glass of prune juice daily for your bowel movements.     Schedule an MRI of your back.      Return in about 3 months (around 9/5/2019) for back pain.    Priyanka Noel MD  [facility]

## 2019-06-17 ENCOUNTER — ANCILLARY PROCEDURE (OUTPATIENT)
Dept: MRI IMAGING | Facility: CLINIC | Age: 84
End: 2019-06-17
Attending: INTERNAL MEDICINE
Payer: COMMERCIAL

## 2019-06-17 ENCOUNTER — ANCILLARY PROCEDURE (OUTPATIENT)
Dept: GENERAL RADIOLOGY | Facility: CLINIC | Age: 84
End: 2019-06-17
Attending: INTERNAL MEDICINE
Payer: COMMERCIAL

## 2019-06-17 DIAGNOSIS — S32.040G CLOSED COMPRESSION FRACTURE OF L4 LUMBAR VERTEBRA WITH DELAYED HEALING, SUBSEQUENT ENCOUNTER: ICD-10-CM

## 2019-06-17 DIAGNOSIS — Z98.890 HISTORY OF METAL REMOVED FROM EYE: ICD-10-CM

## 2019-06-17 PROCEDURE — 70030 X-RAY EYE FOR FOREIGN BODY: CPT | Mod: 50

## 2019-06-17 PROCEDURE — 72148 MRI LUMBAR SPINE W/O DYE: CPT | Mod: TC

## 2019-06-17 NOTE — LETTER
[address] [place] [address]    June 17, 2019    Parish Driscoll  [address] [address]          Dear Marcell Lugo xray   Enclosed is a copy of your results.     Results for orders placed or performed in visit on 06/17/19   XR Eye Foreign Body    Narrative    EYE FOREIGN BODY June 17, 2019 10:42 AM     HISTORY: History of metal removed from eye.    COMPARISON: None.      Impression    IMPRESSION: No radiopaque foreign object is seen over the orbits on  this single provided image.    YONIS DICKEY MD       If you have any questions or concerns, please call myself or my nurse at [phone].      Sincerely,        Priyanka Noel MD/susan

## 2019-06-17 NOTE — RESULT ENCOUNTER NOTE
I called.  Patient will schedule lab and bone density to find out why he has so many compression fractures with this fall.

## 2019-06-26 ENCOUNTER — ANCILLARY PROCEDURE (OUTPATIENT)
Dept: BONE DENSITY | Facility: CLINIC | Age: 84
End: 2019-06-26
Attending: INTERNAL MEDICINE
Payer: COMMERCIAL

## 2019-06-26 DIAGNOSIS — R63.4 UNINTENTIONAL WEIGHT LOSS: ICD-10-CM

## 2019-06-26 DIAGNOSIS — E43 SEVERE PROTEIN-CALORIE MALNUTRITION (H): ICD-10-CM

## 2019-06-26 DIAGNOSIS — S32.030A CLOSED COMPRESSION FRACTURE OF THIRD LUMBAR VERTEBRA, INITIAL ENCOUNTER: ICD-10-CM

## 2019-06-26 DIAGNOSIS — M54.50 LUMBAR PAIN: ICD-10-CM

## 2019-06-26 LAB
ALBUMIN SERPL-MCNC: 3.7 G/DL (ref 3.4–5)
ALP SERPL-CCNC: 105 U/L (ref 40–150)
ALT SERPL W P-5'-P-CCNC: 17 U/L (ref 0–70)
ANION GAP SERPL CALCULATED.3IONS-SCNC: 8 MMOL/L (ref 3–14)
AST SERPL W P-5'-P-CCNC: 18 U/L (ref 0–45)
BILIRUB SERPL-MCNC: 0.3 MG/DL (ref 0.2–1.3)
BUN SERPL-MCNC: 22 MG/DL (ref 7–30)
CALCIUM SERPL-MCNC: 9.1 MG/DL (ref 8.5–10.1)
CHLORIDE SERPL-SCNC: 96 MMOL/L (ref 94–109)
CO2 SERPL-SCNC: 26 MMOL/L (ref 20–32)
COLLECT DURATION TIME UR: NORMAL H
CREAT SERPL-MCNC: 1.1 MG/DL (ref 0.66–1.25)
ERYTHROCYTE [DISTWIDTH] IN BLOOD BY AUTOMATED COUNT: 13.3 % (ref 10–15)
GFR SERPL CREATININE-BSD FRML MDRD: 58 ML/MIN/{1.73_M2}
GLUCOSE SERPL-MCNC: 103 MG/DL (ref 70–99)
HCT VFR BLD AUTO: 36.5 % (ref 40–53)
HGB BLD-MCNC: 12.3 G/DL (ref 13.3–17.7)
MCH RBC QN AUTO: 32.7 PG (ref 26.5–33)
MCHC RBC AUTO-ENTMCNC: 33.7 G/DL (ref 31.5–36.5)
MCV RBC AUTO: 97 FL (ref 78–100)
MISCELLANEOUS TEST: NORMAL
PLATELET # BLD AUTO: 233 10E9/L (ref 150–450)
POTASSIUM SERPL-SCNC: 4 MMOL/L (ref 3.4–5.3)
PROT SERPL-MCNC: 7.6 G/DL (ref 6.8–8.8)
RBC # BLD AUTO: 3.76 10E12/L (ref 4.4–5.9)
SODIUM SERPL-SCNC: 130 MMOL/L (ref 133–144)
SPECIMEN VOL UR: NORMAL ML
TSH SERPL DL<=0.005 MIU/L-ACNC: 2.96 MU/L (ref 0.4–4)
WBC # BLD AUTO: 3.9 10E9/L (ref 4–11)

## 2019-06-26 PROCEDURE — 85027 COMPLETE CBC AUTOMATED: CPT | Performed by: INTERNAL MEDICINE

## 2019-06-26 PROCEDURE — 86334 IMMUNOFIX E-PHORESIS SERUM: CPT | Performed by: INTERNAL MEDICINE

## 2019-06-26 PROCEDURE — 36415 COLL VENOUS BLD VENIPUNCTURE: CPT | Performed by: INTERNAL MEDICINE

## 2019-06-26 PROCEDURE — 77085 DXA BONE DENSITY AXL VRT FX: CPT | Performed by: INTERNAL MEDICINE

## 2019-06-26 PROCEDURE — 82784 ASSAY IGA/IGD/IGG/IGM EACH: CPT | Performed by: INTERNAL MEDICINE

## 2019-06-26 PROCEDURE — 82306 VITAMIN D 25 HYDROXY: CPT | Performed by: INTERNAL MEDICINE

## 2019-06-26 PROCEDURE — 81050 URINALYSIS VOLUME MEASURE: CPT | Performed by: INTERNAL MEDICINE

## 2019-06-26 PROCEDURE — 86335 IMMUNFIX E-PHORSIS/URINE/CSF: CPT | Performed by: INTERNAL MEDICINE

## 2019-06-26 PROCEDURE — 00000402 ZZHCL STATISTIC TOTAL PROTEIN: Performed by: INTERNAL MEDICINE

## 2019-06-26 PROCEDURE — 84166 PROTEIN E-PHORESIS/URINE/CSF: CPT | Performed by: INTERNAL MEDICINE

## 2019-06-26 PROCEDURE — 84165 PROTEIN E-PHORESIS SERUM: CPT | Performed by: INTERNAL MEDICINE

## 2019-06-26 PROCEDURE — 84443 ASSAY THYROID STIM HORMONE: CPT | Performed by: INTERNAL MEDICINE

## 2019-06-26 PROCEDURE — 80053 COMPREHEN METABOLIC PANEL: CPT | Performed by: INTERNAL MEDICINE

## 2019-06-26 NOTE — LETTER
[address] [place] [address]    June 27, 2019    Parish Driscoll  [address] [facility]   [facility] [address]          Dear Lee Lugo is a copy of your results.Please schedule follow up to discuss test results.      Results for orders placed or performed in visit on 06/26/19   Elp random UR reflex UIEP   Result Value Ref Range    ELP Interpretation Urine       Only trace albumin and trace globulins. No obvious monoclonal protein seen. We recommend a   first morning voided urine to detect clinically significant proteinuria.  A random   specimen is not optimal for detecting all proteins.  The specific gravity of this specimen   was only 1.010. Pathological significance requires clinical correlation.  RICHIE Kwok M.D., Ph.D., Pathologist ()      Protein electrophoresis   Result Value Ref Range    Albumin Fraction 4.0 3.7 - 5.1 g/dL    Alpha 1 Fraction 0.4 0.2 - 0.4 g/dL    Alpha 2 Fraction 0.9 0.5 - 0.9 g/dL    Beta Fraction 0.8 0.6 - 1.0 g/dL    Gamma Fraction 1.3 0.7 - 1.6 g/dL    Monoclonal Peak 0.0 0.0 g/dL    ELP Interpretation:       Essentially normal electrophoretic pattern. No monoclonal protein seen. Pathologic   significance requires clinical correlation. RICHIE Kwok M.D., Ph.D., Pathologist ([phone]).      Protein Immunofixation Serum   Result Value Ref Range    Immunofixation ELP       No monoclonal protein seen on immunofixation.  Pathological significance requires clinical   correlation.      IGG 1,260 695 - 1,620 mg/dL     70 - 380 mg/dL    IGM 33 (L) 60 - 265 mg/dL   **TSH with free T4 reflex FUTURE anytime   Result Value Ref Range    TSH 2.96 0.40 - 4.00 mU/L   **Vitamin D Deficiency FUTURE anytime   Result Value Ref Range    Vitamin D Deficiency screening 12 (L) 20 - 75 ug/L   **CBC with platelets FUTURE anytime   Result Value Ref Range    WBC 3.9  (L) 4.0 - 11.0 10e9/L    RBC Count 3.76 (L) 4.4 - 5.9 10e12/L    Hemoglobin 12.3 (L) 13.3 - 17.7 g/dL    Hematocrit 36.5 (L) 40.0 - 53.0 %    MCV 97 78 - 100 fl    MCH 32.7 26.5 - 33.0 pg    MCHC 33.7 31.5 - 36.5 g/dL    RDW 13.3 10.0 - 15.0 %    Platelet Count 233 150 - 450 10e9/L   **Comprehensive metabolic panel FUTURE anytime   Result Value Ref Range    Sodium 130 (L) 133 - 144 mmol/L    Potassium 4.0 3.4 - 5.3 mmol/L    Chloride 96 94 - 109 mmol/L    Carbon Dioxide 26 20 - 32 mmol/L    Anion Gap 8 3 - 14 mmol/L    Glucose 103 (H) 70 - 99 mg/dL    Urea Nitrogen 22 7 - 30 mg/dL    Creatinine 1.10 0.66 - 1.25 mg/dL    GFR Estimate 58 (L) >60 mL/min/[1.73_m2]    GFR Estimate If Black 67 >60 mL/min/[1.73_m2]    Calcium 9.1 8.5 - 10.1 mg/dL    Bilirubin Total 0.3 0.2 - 1.3 mg/dL    Albumin 3.7 3.4 - 5.0 g/dL    Protein Total 7.6 6.8 - 8.8 g/dL    Alkaline Phosphatase 105 40 - 150 U/L    ALT 17 0 - 70 U/L    AST 18 0 - 45 U/L   Urinalysis collection volume & duration   Result Value Ref Range    Volume in mL RANDOM URINE mL    Duration in hours RANDOM URINE h   KAPPA LAMBDA QUANTITATIVE FREE LIGHT CHAINS AND RATIO: Laboratory Miscellaneous Order   Result Value Ref Range    Miscellaneous Test         Specimen Received, Reordered and sent to Performing laboratory - Report to follow upon   completion.     [facility] Miscellaneous Test   Result Value Ref Range    Result PENDING     Test Name       KAPPA LAMBDA QUANTITATIVE FREE LIGHT CHAINS AND RATIO    Send Outs Misc Test Code 13194     Send Outs Misc Test Specimen BLOOD        If you have any questions or concerns, please me or my clinic team at [phone].      Sincerely,        Malaika Mckay/linda

## 2019-06-26 NOTE — LETTER
[address]  [place] [address] [phone]          July 1, 2019    Parish Driscoll                                                                                                             [address] [facility]   [facility] [address]            Dear Parish,    Enclosed is a copy of your recent lab results.    No abnormal proteins seen in your blood or urine. Vitamin D level is very low.  Please start on Vitamin D 2000 international unit(s) daily over the counter. Normal thyroid. Stable, but low sodium level. Stable kidney function. Normal liver blood test. Stable anemia.    Component      Latest Ref Rng & Units 6/26/2019   Sodium      133 - 144 mmol/L 130 (L)   Potassium      3.4 - 5.3 mmol/L 4.0   Chloride      94 - 109 mmol/L 96   Carbon Dioxide      20 - 32 mmol/L 26   Anion Gap      3 - 14 mmol/L 8   Glucose      70 - 99 mg/dL 103 (H)   Urea Nitrogen      7 - 30 mg/dL 22   Creatinine      0.66 - 1.25 mg/dL 1.10   GFR Estimate      >60 mL/min/1.73:m2 58 (L)   GFR Estimate If Black      >60 mL/min/1.73:m2 67   Calcium      8.5 - 10.1 mg/dL 9.1   Bilirubin Total      0.2 - 1.3 mg/dL 0.3   Albumin      3.4 - 5.0 g/dL 3.7   Protein Total      6.8 - 8.8 g/dL 7.6   Alkaline Phosphatase      40 - 150 U/L 105   ALT      0 - 70 U/L 17   AST      0 - 45 U/L 18   WBC      4.0 - 11.0 10e9/L 3.9 (L)   RBC Count      4.4 - 5.9 10e12/L 3.76 (L)   Hemoglobin      13.3 - 17.7 g/dL 12.3 (L)   Hematocrit      40.0 - 53.0 % 36.5 (L)   MCV      78 - 100 fl 97   MCH      26.5 - 33.0 pg 32.7   MCHC      31.5 - 36.5 g/dL 33.7   RDW      10.0 - 15.0 % 13.3   Platelet Count      150 - 450 10e9/L 233   Albumin Fraction      3.7 - 5.1 g/dL 4.0   Alpha 1 Fraction      0.2 - 0.4 g/dL 0.4   Alpha 2 Fraction      0.5 - 0.9 g/dL 0.9   Beta Fraction      0.6 - 1.0 g/dL 0.8   Gamma Fraction      0.7 - 1.6 g/dL 1.3   Monoclonal Peak      0.0 g/dL 0.0   ELP Interpretation:        Essentially normal electrophoretic pattern. No monoclonal protein seen. Pathologic . . .   Immunofixation ELP       No monoclonal protein seen on immunofixation.  Pathological significance requires clinical . . .   IGG      695 - 1,620 mg/dL 1,260   IGA      70 - 380 mg/dL 261   IGM      60 - 265 mg/dL 33 (L)   Volume in mL      mL RANDOM URINE   Duration in hours      h RANDOM URINE   ELP Interpretation Urine       Only trace albumin and trace globulins. No obvious monoclonal protein seen. We recommend a . . .   TSH      0.40 - 4.00 mU/L 2.96   Vitamin D Deficiency screening      20 - 75 ug/L 12 (L)     Please contact myself or my nurse at [phone], with any questions or concerns.    Sincerely,         Priyanka Noel MD/bk

## 2019-06-26 NOTE — LETTER
[address] [place] [address]    2019    Parish Westson  [address] [address]          Dear Lee Lugo is a copy of your bone density results that were discussed with the triage nurse.    Osteoporosis is present.  Let's start Fosamax 70 mg weekly. A prescription has been faxed to [facility].  This will help prevent more fractures.  This must be taken on an empty stomach, with a full glass of water and staying upright for 30 minutes afterwards.    Results for orders placed or performed in visit on 19   DX Hip/Pelvis/Spine w Lat Fraction Judith    Narrative    BONE DENSITOMETRY  [address]  [place] [address]  2019      PATIENT: Parish Driscoll  CHART: [id number]   :  10/10/1926  AGE:  89 year old  SEX:  male   REFERRING PROVIDER:  Priyanka Noel MD     PROCEDURE:  Bone density scanning was performed using DXA technology of   the lumbar spine and hip.  Scanning was performed on a Lunar Prodigy   scanner.  Reporting is completed in the form of a T-score.  The T-score   represents the standard deviation from peak bone mass based on a young   healthy adult.     REFERENCE T-SCORES:       Normal                -1.0 and greater                                 Osteopenia         Between -1.0 and -2.5                                             Osteoporosis     -2.5 and less                                       RISK FACTORS:  Male > 70 years old, Height loss of 3.5 inches, multiple   vertebral fractures    CURRENT TREATMENT:  none listed     FINDINGS:               Lumbar Spine (L1-L4)      T-score:  -2.3, degenerative   changes and changes from fractures present               Left Femoral Neck            T-score:  -3.4               Right Femoral Neck          T-score:  -2.8                          Lumbar (L1-L4) BMD:  "0.939                             Total Hip Mean BMD: 0.772       LATERAL VERTEBRAL ASSESSMENT  Procedure:  Vertebral fracture assessment was performed in the lateral   decubitus position using a Lunar Prodigy  densitometer.  Indications for VFA: T-score of -1.0 or worse, age (male >79), height loss   > 1.5\" and history of vertebral fracture  Confounding factors for VFA: None.  The LVA scan is interpretable from T7   to L4.    VFA Findings: Using the semi-quantitative analysis of Alyssa there was   evidence of spinal deformity as follows:  moderate (grade 2) wedge   fracture of T8, moderate (grade 2) biconcave fracture of L1 , moderate   (grade 2) crush fracture of L2 , and severe (grade 3) wedge fracture of T4  VFA Impression: Parish Driscoll has 4 vertebral fractures identified on   the LVA.  If alternative etiologies for the presence of vertebral   fractures are excluded, the diagnosis is consistent with severe   osteoporosis.     IMPRESSION  Severe osteoporosis on the basis of multiple vertebral fractures.   Degenerative changes and changes due to fractures may falsely elevate   results in the lumbar spine.     Recommendations include ensuring adequate Calcium and Vitamin D.    The current NOF Guidelines recommend treatment for patients with prior hip   or vertebral fracture, T-score -2.5 or below, or 10 year risk of any major   osteoporotic fracture >20% or 10 year risk of hip fracture >3%, as   calculated using the FRAX calculator (www.shef.ac.uk/FRAX or you can   google \"FRAX\").      Based on these guidelines, treatment (in addition to calcium and vitamin   D) is recommended for this patient, after ruling out other causes of   osteoporosis.  This is meant as an aid to clinical decision making; one must still use   clinical judgement.      Follow up can be considered in 2 years.   ___________________  Fransisca Burger M.D.  Electronically signed         If you have any questions or concerns, please call myself " or my nurse at [phone].    Sincerely,        Priyanka Noel MD/rk

## 2019-06-27 LAB
ALBUMIN SERPL ELPH-MCNC: 4 G/DL (ref 3.7–5.1)
ALPHA1 GLOB SERPL ELPH-MCNC: 0.4 G/DL (ref 0.2–0.4)
ALPHA2 GLOB SERPL ELPH-MCNC: 0.9 G/DL (ref 0.5–0.9)
B-GLOBULIN SERPL ELPH-MCNC: 0.8 G/DL (ref 0.6–1)
DEPRECATED CALCIDIOL+CALCIFEROL SERPL-MC: 12 UG/L (ref 20–75)
GAMMA GLOB SERPL ELPH-MCNC: 1.3 G/DL (ref 0.7–1.6)
IGA SERPL-MCNC: 261 MG/DL (ref 70–380)
IGG SERPL-MCNC: 1260 MG/DL (ref 695–1620)
IGM SERPL-MCNC: 33 MG/DL (ref 60–265)
M PROTEIN SERPL ELPH-MCNC: 0 G/DL
PROT PATTERN SERPL ELPH-IMP: NORMAL
PROT PATTERN SERPL IFE-IMP: ABNORMAL
PROT PATTERN UR ELPH-IMP: NORMAL

## 2019-06-28 DIAGNOSIS — E55.9 HYPOVITAMINOSIS D: Primary | ICD-10-CM

## 2019-06-28 DIAGNOSIS — M81.0 OSTEOPOROSIS: ICD-10-CM

## 2019-06-28 LAB
RESULT: ABNORMAL
SEND OUTS MISC TEST CODE: ABNORMAL
SEND OUTS MISC TEST SPECIMEN: ABNORMAL
TEST NAME: ABNORMAL

## 2019-06-28 RX ORDER — ALENDRONATE SODIUM 70 MG/1
70 TABLET ORAL
Qty: 12 TABLET | Refills: 3 | Status: SHIPPED | OUTPATIENT
Start: 2019-06-28 | End: 2020-04-14

## 2019-06-28 RX ORDER — CHOLECALCIFEROL (VITAMIN D3) 50 MCG
1 TABLET ORAL DAILY
COMMUNITY
Start: 2019-06-28

## 2019-06-28 NOTE — RESULT ENCOUNTER NOTE
Call-  No abnormal proteins seen in your blood or urine. Vitamin D level is very low.  Please start on Vitamin D 2000 international unit(s) daily over the counter. Normal thyroid. Stable but low sodium level. Stable kidney function. Normal liver blood test. Stable anemia

## 2019-06-28 NOTE — RESULT ENCOUNTER NOTE
Call  Osteoporosis is present.  Let's start Fosamax 70 mg weekly. This will help prevent more fractures.  ok for 3 month supply with 3 refills. This must be taken on an empty stomach, with a full glass of water and staying upright for 30 minutes afterwards.

## 2019-07-01 PROBLEM — D80.8 LIGHT CHAIN DISEASE (H): Status: ACTIVE | Noted: 2019-07-01

## 2019-07-01 NOTE — RESULT ENCOUNTER NOTE
Call-  The last test demonstrated some abnormal proteins in the blood.  I would like for him to see hematology for further evaluation.  This may be connected with his compression fractures in his back and his weight loss.    Dr. Noel  Heme/onc referral indication - light chain disease- any location

## 2019-07-02 DIAGNOSIS — D80.8 LIGHT CHAIN DISEASE (H): Primary | ICD-10-CM

## 2019-07-03 ENCOUNTER — TELEPHONE (OUTPATIENT)
Dept: ONCOLOGY | Facility: CLINIC | Age: 84
End: 2019-07-03

## 2019-07-03 NOTE — TELEPHONE ENCOUNTER
ONCOLOGY INTAKE: Records Information      APPT INFORMATION:  Referring provider:  Dr. 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 No    ADDITIONAL INFORMATION:  Left VM with hours and phone. Will try again on [id number] if Pt has not been scheduled. Referral in [facility].

## 2019-07-08 ENCOUNTER — TELEPHONE (OUTPATIENT)
Dept: FAMILY MEDICINE | Facility: CLINIC | Age: 84
End: 2019-07-08

## 2019-07-08 DIAGNOSIS — D80.8 LIGHT CHAIN DISEASE (H): Primary | ICD-10-CM

## 2019-07-08 NOTE — TELEPHONE ENCOUNTER
Sister calling wants to discuss referral [facility] Sister states she is elderly and so is he want to see a haematologist closer to home at [facility] [address]. Unable to get transportation down to  please call to discuss.   Caller informed that calls received after 3pm may not be returned same day.

## 2019-07-09 NOTE — TELEPHONE ENCOUNTER
Referral faxed to [facility] at [phone] and (new patient's [phone]).  Patient called and informed.  Fariba Douglass,

## 2019-07-09 NOTE — TELEPHONE ENCOUNTER
Per Dr. Noel: ok for referral to [facility] (same as [facility] [address] [place])    Referral placed  Please fax and notify patient    Sharri Botello RN

## 2019-08-05 ENCOUNTER — TRANSFERRED RECORDS (OUTPATIENT)
Dept: HEALTH INFORMATION MANAGEMENT | Facility: CLINIC | Age: 84
End: 2019-08-05

## 2019-09-06 ENCOUNTER — TELEPHONE (OUTPATIENT)
Dept: FAMILY MEDICINE | Facility: CLINIC | Age: 84
End: 2019-09-06

## 2019-09-06 NOTE — TELEPHONE ENCOUNTER
Reason for Call:  Other home health insurance     Detailed comments: Patient's sister calling about how the patient is paying out of pocket for home health care and to start making payments they need a signature from the doctor. States going to bring the paperwork with on Monday's appointment. Wondering if could discuss with patient on Monday. Please call to advise.     Phone Number Patient can be reached at: Home number on file [phone] (home)    Best Time: Any     Can we leave a detailed message on this number? YES    Call taken on 9/6/2019 at 11:57 AM by Chrissie Brooke

## 2019-09-06 NOTE — TELEPHONE ENCOUNTER
"Called number above for Cori but it does not ring, goes straight to  and is asked to \"enter your number please\"  Tried to enter clinic's number and also tried Cori's number but neither worked    Called patient and he provided Cori's correct phone number  He gave verbal consent to speak with Cori    Called Cori  Patient has been getting HC services through [employer] once every 2 weeks to help with showers and a nurse that does a phone visit once a month  Patient is getting stronger and attitude is much better, much happier  Patient would like Dr. Noel to consider discontinuing HC   Patient should be bringing in forms/questionnaire for Dr. Noel to review and complete    Sharri Botello RN  "

## 2019-09-09 ENCOUNTER — OFFICE VISIT (OUTPATIENT)
Dept: INTERNAL MEDICINE | Facility: CLINIC | Age: 84
End: 2019-09-09
Payer: COMMERCIAL

## 2019-09-09 VITALS
RESPIRATION RATE: 13 BRPM | BODY MASS INDEX: 21 KG/M2 | WEIGHT: 141.8 LBS | HEART RATE: 62 BPM | OXYGEN SATURATION: 98 % | HEIGHT: 69 IN | SYSTOLIC BLOOD PRESSURE: 132 MMHG | DIASTOLIC BLOOD PRESSURE: 68 MMHG

## 2019-09-09 DIAGNOSIS — R60.0 PERIPHERAL EDEMA: ICD-10-CM

## 2019-09-09 DIAGNOSIS — E44.1 MILD PROTEIN-CALORIE MALNUTRITION (H): ICD-10-CM

## 2019-09-09 DIAGNOSIS — E55.9 VITAMIN D DEFICIENCY: ICD-10-CM

## 2019-09-09 DIAGNOSIS — S32.000S LUMBAR COMPRESSION FRACTURE, SEQUELA: Primary | ICD-10-CM

## 2019-09-09 DIAGNOSIS — D80.8 LIGHT CHAIN DISEASE (H): ICD-10-CM

## 2019-09-09 DIAGNOSIS — N18.30 CKD (CHRONIC KIDNEY DISEASE) STAGE 3, GFR 30-59 ML/MIN (H): ICD-10-CM

## 2019-09-09 DIAGNOSIS — Z85.46 HISTORY OF PROSTATE CANCER: ICD-10-CM

## 2019-09-09 PROBLEM — E46 PROTEIN-CALORIE MALNUTRITION (H): Status: ACTIVE | Noted: 2019-09-09

## 2019-09-09 PROCEDURE — 99207 C PAF COMPLETED  NO CHARGE: CPT | Performed by: INTERNAL MEDICINE

## 2019-09-09 PROCEDURE — 99214 OFFICE O/P EST MOD 30 MIN: CPT | Performed by: INTERNAL MEDICINE

## 2019-09-09 ASSESSMENT — MIFFLIN-ST. JEOR: SCORE: 1288.19

## 2019-09-09 ASSESSMENT — PAIN SCALES - GENERAL: PAINLEVEL: MILD PAIN (2)

## 2019-09-09 NOTE — PATIENT INSTRUCTIONS
- Continue taking Vitamin D tablets.  - Beginning of November we will recheck your Vitamin D labs.  Schedule a lab appointment   - I would like you to try eating more.

## 2019-09-09 NOTE — PROGRESS NOTES
Subjective     Parish Driscoll is a 89 year old male who presents to clinic today for the following health issues:      Patient Instructions on Last Visit 6/5/2019:  Lumbar pain - Isn't much better since his initial fall and now he is experiencing urinary urgency and leakage that is new for him. This is concerning given history of prostate cancer as well as his compression fracture.   Patient Instructions   Consider Lidocaine patches on your back and physical therapy for your back if you continue to have problems.    Try a glass of prune juice daily for your bowel movements.   Schedule an MRI of your back.    Return in about 3 months (around 9/5/2019) for back pain.      HPI   Fall and Osteoporosis  Overall. Patient feels good. Patient notes that he can still feel the residual pain from the fall her had and relates that the pain is in his lower back. He is not using his pain patch for his back pain. Oncologist didn't find any abnormalities. He takes Fosamax for osteoporosis. MRI with multiple compression fractures.  bone density determined that he has osteoporosis. He relates that he tends to hunch his back a lot more now.     Wellness - He hasn't been eating as much as he should- his weight has dropped. He takes vitamin D one tablet every day, which he's been on for two months.    Forms from [employer] - Patient came in today with home care forms that was given to him by [facility]. He is unsure what he should do with the forms.      Patient Active Problem List   Diagnosis     Hypothyroidism     Advanced directives, counseling/discussion     Essential hypertension with goal blood pressure less than 140/90     Hyperlipidemia with target LDL less than 130     History of prostate cancer     Hyponatremia     Edema     Right thigh pain     Inflammation of joint of right knee     Closed compression fracture of L4 lumbar vertebra with delayed healing, subsequent encounter     Chronic idiopathic constipation      Urinary urgency     Light chain disease (H)     CKD (chronic kidney disease) stage 3, GFR 30-59 ml/min (H)     Protein-calorie malnutrition (H)     Vitamin D deficiency     Peripheral edema     Past Surgical History:   Procedure Laterality Date     CYSTECTOMY BRANCHIAL CLEFT  2015     CYSTOSCOPY  2003     TONSILLECTOMY      at age 12       Social History     Tobacco Use     Smoking status: Former Smoker     Years: 15.00     Types: Cigarettes     Last attempt to quit: 1960     Years since quittin.7     Smokeless tobacco: Never Used   Substance Use Topics     Alcohol use: No     Family History   Problem Relation Age of Onset     Hypertension Mother      Hypertension Father      Depression Father      Asthma Sister      Depression Sister      Diabetes Maternal Grandmother      Cancer Maternal Grandfather         Stomach     Heart Disease No family hx of          Current Outpatient Medications   Medication Sig Dispense Refill     alendronate (FOSAMAX) 70 MG tablet Take 1 tablet (70 mg) by mouth every 7 days (take on empty stomach with a full glass of water. Stay upright and do not eat or take other meds for 30 minutes afterwards) 12 tablet 3     amLODIPine (NORVASC) 5 MG tablet TAKE 1 TABLET EVERY DAY 90 tablet 3     aspirin 81 MG EC tablet Take 1 tablet (81 mg) by mouth daily 90 tablet 3     augmented betamethasone dipropionate (DIPROLENE-AF) 0.05 % external cream Apply sparingly to affected area twice daily as needed.  Do not apply to face. 50 g 2     levothyroxine (SYNTHROID/LEVOTHROID) 88 MCG tablet TAKE 1 TABLET EVERY DAY 90 tablet 3     lisinopril (PRINIVIL/ZESTRIL) 20 MG tablet TAKE 1 TABLET EVERY DAY IN ADDITION TO    LISINOPRIL HCTZ 20/12.5 MG. 90 tablet 3     lisinopril-hydrochlorothiazide (PRINZIDE/ZESTORETIC) 20-12.5 MG tablet TAKE 1 TABLET EVERY DAY IN ADDITION TO LISINOPRIL 20 MG DAILY. 90 tablet 3     metoprolol tartrate (LOPRESSOR) 25 MG tablet Take 1 tablet (25 mg) by mouth 2 times daily  "180 tablet 3     vitamin D3 (CHOLECALCIFEROL) 2000 units (50 mcg) tablet Take 1 tablet (2,000 Units) by mouth daily       Allergies   Allergen Reactions     Mold      Nkda [No Known Drug Allergies]      Seasonal Allergies Other (See Comments)     Runny nose       Reviewed and updated as needed this visit by Provider  Tobacco  Allergies  Meds  Problems  Med Hx  Surg Hx  Fam Hx         Review of Systems   ROS COMP: Constitutional, HEENT, cardiovascular, pulmonary, GI, , musculoskeletal, neuro, skin, endocrine and psych systems are negative, except as otherwise noted.    This document serves as a record of the services and decisions personally performed and made by Priyanka Noel MD. It was created on her behalf by Jemma Kay, a trained medical scribe. The creation of this document is based on the provider's statements to the medical scribe.  Jemma Kay 1:51 PM September 9, 2019      Objective    /68 (BP Location: Left arm, Cuff Size: Adult Regular)   Pulse 62   Resp 13   Ht 1.76 m (5' 9.29\")   Wt 64.3 kg (141 lb 12.8 oz)   SpO2 98%   BMI 20.76 kg/m    Body mass index is 20.76 kg/m .  Physical Exam   GENERAL: healthy, alert and no distress  RESP: lungs clear to auscultation - no rales, rhonchi or wheezes  CV: regular rate and rhythm, normal S1 S2, no S3 or S4, no murmur, click or rub, no peripheral edema and peripheral pulses strong  MS: no gross musculoskeletal defects noted, 1+ ankle edema on left and trace on the right  PSYCH: mentation appears normal, affect normal/bright    Diagnostic Test Results:  Labs reviewed in Epic  No results found for this or any previous visit (from the past 24 hour(s)).        Assessment & Plan     1. Lumbar compression fracture, sequela  Not much improvement since initial fall. He can still feel the residual pain from the fall  and relates that the pain is in his lower back. He is not using his pain patch for his back pain.     2. Light chain disease (H)  Will continue " to monitor.    3. CKD (chronic kidney disease) stage 3, GFR 30-59 ml/min (H)  Stable. Metabolic panel ordered today.  Will recheck labs next time.  - **Basic metabolic panel FUTURE anytime; Future    4. Mild protein-calorie malnutrition (H)  Stable. Will recheck labs next time.    5. History of prostate cancer  Stable. Will continue to monitor.  Oncologist didn't find any abnormalities.    6. Vitamin D deficiency  Per patient instructions.  Will recheck Vitamin D in beginning of November.  - **Vitamin D Deficiency FUTURE anytime; Future    7. Peripheral edema  Exam shows 1+ ankle edema on the left and trace on the right.  Chronic and unchanged.             Patient Instructions   - Continue taking Vitamin D tablets.  - Beginning of November we will recheck your Vitamin D labs. Schedule a lab appointment   - I would like you to try eating more.      The information in this document, created by the medical scribe for me, accurately reflects the services I personally performed and the decisions made by me. I have reviewed and approved this document for accuracy prior to leaving the patient care area.  September 9, 2019 1:52 PM    I spent 18 minutes of time with the patient and >50% of it was in education and counseling regarding health maintenance and medication recheck.  In: 1:51 PM  Out: 2:09 PM    Priyanka Noel MD  [facility]

## 2019-11-11 DIAGNOSIS — E55.9 VITAMIN D DEFICIENCY: ICD-10-CM

## 2019-11-11 DIAGNOSIS — N18.30 CKD (CHRONIC KIDNEY DISEASE) STAGE 3, GFR 30-59 ML/MIN (H): ICD-10-CM

## 2019-11-11 LAB
ANION GAP SERPL CALCULATED.3IONS-SCNC: 4 MMOL/L (ref 3–14)
BUN SERPL-MCNC: 33 MG/DL (ref 7–30)
CALCIUM SERPL-MCNC: 9.5 MG/DL (ref 8.5–10.1)
CHLORIDE SERPL-SCNC: 96 MMOL/L (ref 94–109)
CO2 SERPL-SCNC: 30 MMOL/L (ref 20–32)
CREAT SERPL-MCNC: 1.22 MG/DL (ref 0.66–1.25)
DEPRECATED CALCIDIOL+CALCIFEROL SERPL-MC: 44 UG/L (ref 20–75)
GFR SERPL CREATININE-BSD FRML MDRD: 51 ML/MIN/{1.73_M2}
GLUCOSE SERPL-MCNC: 63 MG/DL (ref 70–99)
POTASSIUM SERPL-SCNC: 4.1 MMOL/L (ref 3.4–5.3)
SODIUM SERPL-SCNC: 130 MMOL/L (ref 133–144)

## 2019-11-11 PROCEDURE — 36415 COLL VENOUS BLD VENIPUNCTURE: CPT | Performed by: INTERNAL MEDICINE

## 2019-11-11 PROCEDURE — 80048 BASIC METABOLIC PNL TOTAL CA: CPT | Performed by: INTERNAL MEDICINE

## 2019-11-11 PROCEDURE — 82306 VITAMIN D 25 HYDROXY: CPT | Performed by: INTERNAL MEDICINE

## 2019-11-14 DIAGNOSIS — I10 ESSENTIAL HYPERTENSION WITH GOAL BLOOD PRESSURE LESS THAN 140/90: ICD-10-CM

## 2019-11-14 RX ORDER — LISINOPRIL 40 MG/1
40 TABLET ORAL DAILY
Qty: 90 TABLET | Refills: 1 | Status: SHIPPED | OUTPATIENT
Start: 2019-11-14 | End: 2020-03-31

## 2019-11-14 NOTE — RESULT ENCOUNTER NOTE
Normal Vitamin D. Stable sodium level but the kidney function is worse.  Let's have you stop the hydrochlorothiazide and take only Lisinopril 40 mg daily.  3 month supply with 1 refill. This should help the sodium level and kidney function.  Follow up with myself or Inés Aragon (15 minute appointment) in 2 weeks to assess blood pressure and leg swelling after these changes.

## 2020-01-03 DIAGNOSIS — L20.9 ATOPIC DERMATITIS, UNSPECIFIED TYPE: ICD-10-CM

## 2020-01-06 RX ORDER — BETAMETHASONE DIPROPIONATE 0.5 MG/G
CREAM TOPICAL
Qty: 15 G | Refills: 1 | Status: SHIPPED | OUTPATIENT
Start: 2020-01-06 | End: 2020-01-21

## 2020-01-06 NOTE — TELEPHONE ENCOUNTER
"Prescription approved per [facility] Refill Protocol.    Requested Prescriptions   Pending Prescriptions Disp Refills     augmented betamethasone dipropionate (DIPROLENE-AF) 0.05 % external cream [Pharmacy Med Name: Betamethasone Dipropionate Aug External Cream 0.05 %] 15 g 1     Sig: APPLY SPARINGLY TO AFFECTED AREA TWICE DAILY AS NEEDED.  DO NOT APPLY TO FACE.       Topical Steroids and Nonsteroidals Protocol Passed - 1/6/2020  8:38 AM        Passed - Patient is age 6 or older        Passed - Authorizing prescriber's most recent note related to this medication read.     If refill request is for ophthalmic use, please forward request to provider for approval.          Passed - High potency steroid not ordered        Passed - Recent (12 mo) or future (30 days) visit within the authorizing provider's specialty     Patient has had an office visit with the authorizing provider or a provider within the authorizing providers department within the previous 12 mos or has a future within next 30 days. See \"Patient Info\" tab in inbasket, or \"Choose Columns\" in Meds & Orders section of the refill encounter.              Passed - Medication is active on med list        "

## 2020-01-21 ENCOUNTER — TELEPHONE (OUTPATIENT)
Dept: INTERNAL MEDICINE | Facility: CLINIC | Age: 85
End: 2020-01-21

## 2020-01-21 DIAGNOSIS — L20.9 ATOPIC DERMATITIS, UNSPECIFIED TYPE: ICD-10-CM

## 2020-01-21 RX ORDER — BETAMETHASONE DIPROPIONATE 0.5 MG/G
CREAM TOPICAL
Qty: 15 G | Refills: 1 | Status: SHIPPED | OUTPATIENT
Start: 2020-01-21

## 2020-01-21 NOTE — TELEPHONE ENCOUNTER
Received fax from [facility] stating we need a Days supply foraugmented betamethasone dipropionate (DIPROLENE-AF) 0.05 % external cream. Please advise and send back to pharmacy.     Thais KEMP CMA ([facility])

## 2020-03-09 ENCOUNTER — OFFICE VISIT (OUTPATIENT)
Dept: INTERNAL MEDICINE | Facility: CLINIC | Age: 85
End: 2020-03-09
Payer: COMMERCIAL

## 2020-03-09 VITALS
DIASTOLIC BLOOD PRESSURE: 60 MMHG | OXYGEN SATURATION: 98 % | RESPIRATION RATE: 15 BRPM | TEMPERATURE: 98.5 F | BODY MASS INDEX: 22.44 KG/M2 | HEIGHT: 69 IN | SYSTOLIC BLOOD PRESSURE: 140 MMHG | WEIGHT: 151.5 LBS | HEART RATE: 54 BPM

## 2020-03-09 DIAGNOSIS — C61 PROSTATE CANCER (H): ICD-10-CM

## 2020-03-09 DIAGNOSIS — D64.9 ANEMIA, UNSPECIFIED TYPE: ICD-10-CM

## 2020-03-09 DIAGNOSIS — R60.0 PERIPHERAL EDEMA: ICD-10-CM

## 2020-03-09 DIAGNOSIS — E44.1 MILD PROTEIN-CALORIE MALNUTRITION (H): ICD-10-CM

## 2020-03-09 DIAGNOSIS — N18.30 CKD (CHRONIC KIDNEY DISEASE) STAGE 3, GFR 30-59 ML/MIN (H): ICD-10-CM

## 2020-03-09 DIAGNOSIS — M79.671 RIGHT FOOT PAIN: ICD-10-CM

## 2020-03-09 DIAGNOSIS — I10 ESSENTIAL HYPERTENSION WITH GOAL BLOOD PRESSURE LESS THAN 140/90: ICD-10-CM

## 2020-03-09 DIAGNOSIS — E87.1 HYPONATREMIA: ICD-10-CM

## 2020-03-09 DIAGNOSIS — D80.8 LIGHT CHAIN DISEASE (H): Primary | ICD-10-CM

## 2020-03-09 LAB
ALBUMIN SERPL-MCNC: 3.7 G/DL (ref 3.4–5)
ALP SERPL-CCNC: 50 U/L (ref 40–150)
ALT SERPL W P-5'-P-CCNC: 20 U/L (ref 0–70)
ANION GAP SERPL CALCULATED.3IONS-SCNC: 3 MMOL/L (ref 3–14)
AST SERPL W P-5'-P-CCNC: 19 U/L (ref 0–45)
BASOPHILS # BLD AUTO: 0 10E9/L (ref 0–0.2)
BASOPHILS NFR BLD AUTO: 0.5 %
BILIRUB SERPL-MCNC: 0.3 MG/DL (ref 0.2–1.3)
BUN SERPL-MCNC: 30 MG/DL (ref 7–30)
CALCIUM SERPL-MCNC: 9.1 MG/DL (ref 8.5–10.1)
CHLORIDE SERPL-SCNC: 101 MMOL/L (ref 94–109)
CO2 SERPL-SCNC: 30 MMOL/L (ref 20–32)
CREAT SERPL-MCNC: 1.18 MG/DL (ref 0.66–1.25)
DIFFERENTIAL METHOD BLD: ABNORMAL
EOSINOPHIL # BLD AUTO: 0.1 10E9/L (ref 0–0.7)
EOSINOPHIL NFR BLD AUTO: 2.6 %
ERYTHROCYTE [DISTWIDTH] IN BLOOD BY AUTOMATED COUNT: 13.9 % (ref 10–15)
GFR SERPL CREATININE-BSD FRML MDRD: 53 ML/MIN/{1.73_M2}
GLUCOSE SERPL-MCNC: 93 MG/DL (ref 70–99)
HCT VFR BLD AUTO: 35.9 % (ref 40–53)
HGB BLD-MCNC: 11.7 G/DL (ref 13.3–17.7)
LYMPHOCYTES # BLD AUTO: 1 10E9/L (ref 0.8–5.3)
LYMPHOCYTES NFR BLD AUTO: 25.6 %
MCH RBC QN AUTO: 32.6 PG (ref 26.5–33)
MCHC RBC AUTO-ENTMCNC: 32.6 G/DL (ref 31.5–36.5)
MCV RBC AUTO: 100 FL (ref 78–100)
MONOCYTES # BLD AUTO: 0.6 10E9/L (ref 0–1.3)
MONOCYTES NFR BLD AUTO: 16.1 %
NEUTROPHILS # BLD AUTO: 2.1 10E9/L (ref 1.6–8.3)
NEUTROPHILS NFR BLD AUTO: 55.2 %
PLATELET # BLD AUTO: 171 10E9/L (ref 150–450)
POTASSIUM SERPL-SCNC: 4.8 MMOL/L (ref 3.4–5.3)
PROT SERPL-MCNC: 7.3 G/DL (ref 6.8–8.8)
RBC # BLD AUTO: 3.59 10E12/L (ref 4.4–5.9)
SODIUM SERPL-SCNC: 134 MMOL/L (ref 133–144)
T4 FREE SERPL-MCNC: 1.12 NG/DL (ref 0.76–1.46)
TSH SERPL DL<=0.005 MIU/L-ACNC: 21.84 MU/L (ref 0.4–4)
WBC # BLD AUTO: 3.8 10E9/L (ref 4–11)

## 2020-03-09 PROCEDURE — 99214 OFFICE O/P EST MOD 30 MIN: CPT | Performed by: INTERNAL MEDICINE

## 2020-03-09 PROCEDURE — 36415 COLL VENOUS BLD VENIPUNCTURE: CPT | Performed by: INTERNAL MEDICINE

## 2020-03-09 PROCEDURE — 84443 ASSAY THYROID STIM HORMONE: CPT | Performed by: INTERNAL MEDICINE

## 2020-03-09 PROCEDURE — 84439 ASSAY OF FREE THYROXINE: CPT | Performed by: INTERNAL MEDICINE

## 2020-03-09 PROCEDURE — 85025 COMPLETE CBC W/AUTO DIFF WBC: CPT | Performed by: INTERNAL MEDICINE

## 2020-03-09 PROCEDURE — 80053 COMPREHEN METABOLIC PANEL: CPT | Performed by: INTERNAL MEDICINE

## 2020-03-09 ASSESSMENT — MIFFLIN-ST. JEOR: SCORE: 1322.58

## 2020-03-09 NOTE — PROGRESS NOTES
"Subjective     Parish Driscoll is a 89 year old male who presents to clinic today for the following health issues:      Patient Instructions on Last Visit 9/09/2019:  Patient Instructions   - Continue taking Vitamin D tablets.  - Beginning of November we will recheck your Vitamin D labs. Schedule a lab appointment   - I would like you to try eating more.      HPI   Patient is doing well overall. He relates that once in a while he notes \"a flash of light pain\" across all his toes on the right foot that comes and goes. This started a few days ago. It comes and goes a few times in a day. Denies any recent injury to his feet. Denies chest pain or short of breath. Only has shortness of breath when walking up the stairs. He notes more swelling in his left foot and ankle. He feels that the swelling has reduced on the left foot. He states that it doesn't really bother him and believes it has been improving. Patient is doing well with medications without complications. Bowel movement are good. Patient notes that his back pain is pretty mild when it does come on.    Patient Active Problem List   Diagnosis     Hypothyroidism     Advanced directives, counseling/discussion     Essential hypertension with goal blood pressure less than 140/90     Hyperlipidemia with target LDL less than 130     History of prostate cancer     Hyponatremia     Edema     Right thigh pain     Inflammation of joint of right knee     Closed compression fracture of L4 lumbar vertebra with delayed healing, subsequent encounter     Chronic idiopathic constipation     Urinary urgency     Light chain disease (H)     CKD (chronic kidney disease) stage 3, GFR 30-59 ml/min (H)     Protein-calorie malnutrition (H)     Vitamin D deficiency     Peripheral edema     Prostate cancer (H)     Past Surgical History:   Procedure Laterality Date     CYSTECTOMY BRANCHIAL CLEFT  11/2015     CYSTOSCOPY  06/2003     TONSILLECTOMY      at age 12       Social History "     Tobacco Use     Smoking status: Former Smoker     Years: 15.00     Types: Cigarettes     Last attempt to quit: 1960     Years since quittin.2     Smokeless tobacco: Never Used   Substance Use Topics     Alcohol use: No     Family History   Problem Relation Age of Onset     Hypertension Mother      Hypertension Father      Depression Father      Asthma Sister      Depression Sister      Diabetes Maternal Grandmother      Cancer Maternal Grandfather         Stomach     Heart Disease No family hx of          Current Outpatient Medications   Medication Sig Dispense Refill     alendronate (FOSAMAX) 70 MG tablet Take 1 tablet (70 mg) by mouth every 7 days (take on empty stomach with a full glass of water. Stay upright and do not eat or take other meds for 30 minutes afterwards) 12 tablet 3     amLODIPine (NORVASC) 5 MG tablet TAKE 1 TABLET EVERY DAY 90 tablet 3     aspirin 81 MG EC tablet Take 1 tablet (81 mg) by mouth daily 90 tablet 3     augmented betamethasone dipropionate (DIPROLENE-AF) 0.05 % external cream APPLY SPARINGLY TO AFFECTED AREA TWICE DAILY AS NEEDED.  DO NOT APPLY TO FACE.  Use for 14 days. 15 g 1     levothyroxine (SYNTHROID/LEVOTHROID) 88 MCG tablet TAKE 1 TABLET EVERY DAY 90 tablet 3     lisinopril (PRINIVIL/ZESTRIL) 40 MG tablet Take 1 tablet (40 mg) by mouth daily 90 tablet 1     metoprolol tartrate (LOPRESSOR) 25 MG tablet Take 1 tablet (25 mg) by mouth 2 times daily 180 tablet 3     vitamin D3 (CHOLECALCIFEROL) 2000 units (50 mcg) tablet Take 1 tablet (2,000 Units) by mouth daily       Allergies   Allergen Reactions     Mold      Nkda [No Known Drug Allergies]      Seasonal Allergies Other (See Comments)     Runny nose     BP Readings from Last 3 Encounters:   20 (!) 140/60   19 132/68   19 118/74    Wt Readings from Last 3 Encounters:   20 68.7 kg (151 lb 8 oz)   19 64.3 kg (141 lb 12.8 oz)   19 67 kg (147 lb 12.8 oz)           Reviewed and updated  "as needed this visit by Provider  Tobacco  Allergies  Meds  Problems  Med Hx  Surg Hx  Fam Hx         Review of Systems   ROS COMP: Constitutional, HEENT, cardiovascular, pulmonary, GI, , musculoskeletal, neuro, skin, endocrine and psych systems are negative, except as otherwise noted.    This document serves as a record of the services and decisions personally performed and made by Priyanka Noel MD. It was created on her behalf by Jemma Kay, a trained medical scribe. The creation of this document is based on the provider's statements to the medical scribe.  Jemma Kay 1:38 PM March 9, 2020      Objective    BP (!) 140/60   Pulse 54   Temp 98.5  F (36.9  C) (Oral)   Resp 15   Ht 1.753 m (5' 9\")   Wt 68.7 kg (151 lb 8 oz)   SpO2 98%   BMI 22.37 kg/m    Body mass index is 22.37 kg/m .  Physical Exam   GENERAL: healthy, alert and no distress  NECK: no adenopathy, no asymmetry, masses, or scars and thyroid normal to palpation  RESP: lungs clear to auscultation - no rales, rhonchi or wheezes  CV: regular rate and rhythm, normal S1 S2, no S3 or S4, no murmur, click or rub, no peripheral edema and peripheral pulses strong  ABDOMEN: soft, nontender, no hepatosplenomegaly, no masses and bowel sounds normal  MS: no gross musculoskeletal defects noted, 2+ edema to the ankle on the right, 2+ edema to the mid-calf on the left  PSYCH: mentation appears normal, affect normal/bright      Diagnostic Test Results:  Labs reviewed in Epic  No results found for this or any previous visit (from the past 24 hour(s)).        Assessment & Plan     1. Light chain disease (H)  Will continue to monitor.    2. Mild protein-calorie malnutrition (H)  Has gained some weight recently. Stable.  Labs completed today. Will continue to monitor.  - CBC with platelets differential  - TSH with free T4 reflex  - Comprehensive metabolic panel    3. Prostate cancer (H)  Will continue to monitor.    4. CKD (chronic kidney disease) stage 3, GFR " "30-59 ml/min (H)  Stable without complications.  CMP completed today. Will continue to monitor.  - Comprehensive metabolic panel    5. Essential hypertension with goal blood pressure less than 140/90  Managed with current measures without complications.  CBC, TSH and CMP completed today. Will continue to monitor.  - CBC with platelets differential  - TSH with free T4 reflex  - Comprehensive metabolic panel    6. Hyponatremia  Managed without complications.  CMP completed today. Will continue to monitor periodically.  - Comprehensive metabolic panel    7. Anemia, unspecified type  Managed without complications.  CBC completed today. Will continue to monitor periodically.  - CBC with platelets differential    8. Right foot pain  Patient notes \"flash of light pain\" across his toes on his right foot.    9. Peripheral edema  Exam shows 2+ edema to the ankle on the right, 2+ edema to the mid-calf on the left.  If I reduced the norvasc to help improve this then his blood pressure would be too high.  If I started a diuretic then his sodium may drop too much.  Thankfully the patient is not at all bothered by this and feels it is actually better for him  Advised patient to use compression stockings-prescription placed today.  - Compression Sleeve/Stocking Order      Patient Instructions   - Consider shingle vaccine. Take slip down to Pharmacy to find out the cost.  - Use compression stockings for the swelling in your feet. Wear them first thing in the morning and take them off at night.      The information in this document, created by the medical scribe for me, accurately reflects the services I personally performed and the decisions made by me. I have reviewed and approved this document for accuracy prior to leaving the patient care area.  March 9, 2020 1:38 PM    I spent 14 minutes of time with the patient and >50% of it was in education and counseling regarding health maintenance and medication recheck.  In: 1:38 PM  Out: " 1:52 PM    Priyanka Noel MD  -[facility]

## 2020-03-09 NOTE — PATIENT INSTRUCTIONS
- Consider shingle vaccine. Take slip down to Pharmacy to find out the cost.  - Use compression stockings for the swelling in your feet. Wear them first thing in the morning and take them off at night.

## 2020-03-09 NOTE — PROGRESS NOTES
DME (Durable Medical Equipment) Orders and Documentation  Orders Placed This Encounter   Procedures     Compression Sleeve/Stocking Order      The patient was assessed and it was determined the patient is in need of the following listed DME Supplies/Equipment. Please complete supporting documentation below to demonstrate medical necessity.      Compression Sleeve/Stocking(s) Supplies Documentation  The patient needs compression stockings for edema

## 2020-03-10 DIAGNOSIS — E03.9 HYPOTHYROIDISM, UNSPECIFIED TYPE: Primary | ICD-10-CM

## 2020-03-10 RX ORDER — LEVOTHYROXINE SODIUM 112 UG/1
112 TABLET ORAL DAILY
Qty: 30 TABLET | Refills: 1 | Status: SHIPPED | OUTPATIENT
Start: 2020-03-10 | End: 2020-04-21

## 2020-03-10 NOTE — RESULT ENCOUNTER NOTE
Thyroid is underactive.  Increase Levothyroxine to 112 mcg daily.  Repeat thyroid bloodwork (TSH, free T4) at the lab in 6-8 weeks.  1 month supply with 1 refill.     Normal electrolytes. Stable kidney function. Normal liver blood test. Stable blood count.

## 2020-03-12 ENCOUNTER — DOCUMENTATION ONLY (OUTPATIENT)
Dept: OTHER | Facility: CLINIC | Age: 85
End: 2020-03-12

## 2020-03-30 DIAGNOSIS — I10 ESSENTIAL HYPERTENSION WITH GOAL BLOOD PRESSURE LESS THAN 140/90: ICD-10-CM

## 2020-03-31 RX ORDER — LISINOPRIL 40 MG/1
40 TABLET ORAL DAILY
Qty: 90 TABLET | Refills: 1 | Status: SHIPPED | OUTPATIENT
Start: 2020-03-31 | End: 2020-05-01

## 2020-03-31 NOTE — TELEPHONE ENCOUNTER
"Requested Prescriptions   Pending Prescriptions Disp Refills     lisinopril (ZESTRIL) 40 MG tablet [Pharmacy Med Name: LISINOPRIL 40 MG Tablet] 90 tablet 1     Sig: TAKE 1 TABLET (40 MG) BY MOUTH DAILY       ACE Inhibitors (Including Combos) Protocol Failed - 3/30/2020  1:33 PM        Failed - Blood pressure under 140/90 in past 12 months     BP Readings from Last 3 Encounters:   03/09/20 (!) 140/60   09/09/19 132/68   06/05/19 118/74                 Passed - Recent (12 mo) or future (30 days) visit within the authorizing provider's specialty     Patient has had an office visit with the authorizing provider or a provider within the authorizing providers department within the previous 12 mos or has a future within next 30 days. See \"Patient Info\" tab in inbasket, or \"Choose Columns\" in Meds & Orders section of the refill encounter.              Passed - Medication is active on med list        Passed - Patient is age 18 or older        Passed - Normal serum creatinine on file in past 12 months     Recent Labs   Lab Test 03/09/20  1401   CR 1.18       Ok to refill medication if creatinine is low          Passed - Normal serum potassium on file in past 12 months     Recent Labs   Lab Test 03/09/20  1401   POTASSIUM 4.8                LOV: 03-, follow up advised 09-.    Routing refill request to provider for review/approval because:  Blood pressure out of range.    Carolee Simmons RN          "

## 2020-04-13 DIAGNOSIS — M81.0 OSTEOPOROSIS: ICD-10-CM

## 2020-04-14 RX ORDER — ALENDRONATE SODIUM 70 MG/1
TABLET ORAL
Qty: 12 TABLET | Refills: 1 | Status: SHIPPED | OUTPATIENT
Start: 2020-04-14 | End: 2020-10-05

## 2020-04-17 DIAGNOSIS — I10 ESSENTIAL HYPERTENSION WITH GOAL BLOOD PRESSURE LESS THAN 140/90: ICD-10-CM

## 2020-04-19 RX ORDER — AMLODIPINE BESYLATE 5 MG/1
TABLET ORAL
Qty: 90 TABLET | Refills: 1 | Status: SHIPPED | OUTPATIENT
Start: 2020-04-19 | End: 2020-05-01

## 2020-04-19 RX ORDER — METOPROLOL TARTRATE 25 MG/1
25 TABLET, FILM COATED ORAL 2 TIMES DAILY
Qty: 180 TABLET | Refills: 1 | Status: SHIPPED | OUTPATIENT
Start: 2020-04-19 | End: 2020-05-01

## 2020-04-19 NOTE — TELEPHONE ENCOUNTER
Prescription approved per [facility] Refill Protocol.  Marielle Kelly RN

## 2020-04-20 DIAGNOSIS — E03.9 HYPOTHYROIDISM, UNSPECIFIED TYPE: ICD-10-CM

## 2020-04-21 RX ORDER — LEVOTHYROXINE SODIUM 112 UG/1
TABLET ORAL
Qty: 30 TABLET | Refills: 0 | Status: SHIPPED | OUTPATIENT
Start: 2020-04-21 | End: 2020-06-02

## 2020-04-21 NOTE — TELEPHONE ENCOUNTER
"Routing refill request to provider for review/approval because:  Labs out of range:  TSH    Requested Prescriptions   Pending Prescriptions Disp Refills     levothyroxine (SYNTHROID/LEVOTHROID) 112 MCG tablet [Pharmacy Med Name: LEVOTHYROXINE SODIUM 112 MCG Tablet] 60 tablet      Sig: TAKE 1 TABLET EVERY DAY (DOSE CHANGE, NEED TO RE-CHECK LAB)       Thyroid Protocol Failed - 4/21/2020  6:55 AM        Failed - Normal TSH on file in past 12 months     Recent Labs   Lab Test 03/09/20  1401   TSH 21.84*              Passed - Patient is 12 years or older        Passed - Recent (12 mo) or future (30 days) visit within the authorizing provider's specialty     Patient has had an office visit with the authorizing provider or a provider within the authorizing providers department within the previous 12 mos or has a future within next 30 days. See \"Patient Info\" tab in inbasket, or \"Choose Columns\" in Meds & Orders section of the refill encounter.              Passed - Medication is active on med list           Ananya Bridges RN  "

## 2020-05-01 ENCOUNTER — VIRTUAL VISIT (OUTPATIENT)
Dept: INTERNAL MEDICINE | Facility: CLINIC | Age: 85
End: 2020-05-01
Payer: COMMERCIAL

## 2020-05-01 DIAGNOSIS — I10 ESSENTIAL HYPERTENSION WITH GOAL BLOOD PRESSURE LESS THAN 140/90: ICD-10-CM

## 2020-05-01 DIAGNOSIS — E03.9 HYPOTHYROIDISM, UNSPECIFIED TYPE: ICD-10-CM

## 2020-05-01 PROCEDURE — 99213 OFFICE O/P EST LOW 20 MIN: CPT | Mod: 95 | Performed by: INTERNAL MEDICINE

## 2020-05-01 RX ORDER — LISINOPRIL 40 MG/1
40 TABLET ORAL DAILY
Qty: 90 TABLET | Refills: 1 | Status: SHIPPED | OUTPATIENT
Start: 2020-05-01 | End: 2020-10-05

## 2020-05-01 RX ORDER — METOPROLOL TARTRATE 25 MG/1
25 TABLET, FILM COATED ORAL 2 TIMES DAILY
Qty: 180 TABLET | Refills: 1 | Status: SHIPPED | OUTPATIENT
Start: 2020-05-01 | End: 2020-10-05

## 2020-05-01 RX ORDER — LEVOTHYROXINE SODIUM 112 UG/1
TABLET ORAL
Qty: 30 TABLET | Refills: 0 | Status: CANCELLED | OUTPATIENT
Start: 2020-05-01

## 2020-05-01 RX ORDER — AMLODIPINE BESYLATE 5 MG/1
TABLET ORAL
Qty: 90 TABLET | Refills: 1 | Status: SHIPPED | OUTPATIENT
Start: 2020-05-01 | End: 2020-10-05

## 2020-05-01 NOTE — PROGRESS NOTES
"Parish Driscoll is a 89 year old male who is being evaluated via a billable telephone visit.      The patient has been notified of following:     \"This telephone visit will be conducted via a call between you and your physician/provider. We have found that certain health care needs can be provided without the need for a physical exam.  This service lets us provide the care you need with a short phone conversation.  If a prescription is necessary we can send it directly to your pharmacy.  If lab work is needed we can place an order for that and you can then stop by our lab to have the test done at a later time.    Telephone visits are billed at different rates depending on your insurance coverage. During this emergency period, for some insurers they may be billed the same as an in-person visit.  Please reach out to your insurance provider with any questions.    If during the course of the call the physician/provider feels a telephone visit is not appropriate, you will not be charged for this service.\"    Patient has given verbal consent for Telephone visit?  Yes    How would you like to obtain your AVS? Mail a copy    Subjective     Parish Driscoll is a 89 year old male who presents to clinic today for the following health issues:    He has no complaints.  His weight is stable he thinks.     He thinks he has been on the 112 mcg dose for about 8 weeks.  No symptoms of hypo or hyperthyroidism: no decreased or increased weight, no feeling cold/chilly or excessively warm, no diarrhea or constipation, no undue sweatiness, anxiety or palpitations.      History of Present Illness                        Reviewed and updated as needed this visit by Provider  Tobacco  Allergies  Meds  Problems  Med Hx  Surg Hx  Fam Hx         Review of Systems    ROS: 4 point ROS neg other than the symptoms noted above in the HPI.         Objective   Reported vitals:  There were no vitals taken for this visit.   healthy, alert and " no distress  PSYCH: Alert and oriented times 3; coherent speech, normal   rate and volume, able to articulate logical thoughts, able   to abstract reason, no tangential thoughts, no hallucinations   or delusions  His affect is normal  RESP: No cough, no audible wheezing, able to talk in full sentences  Remainder of exam unable to be completed due to telephone visits    Diagnostic Test Results:  Labs reviewed in Epic        Assessment/Plan:  1. Essential hypertension with goal blood pressure less than 140/90  Well controlled with medications without side effects.   - amLODIPine (NORVASC) 5 MG tablet; TAKE 1 TABLET EVERY DAY  Dispense: 90 tablet; Refill: 1  - lisinopril (ZESTRIL) 40 MG tablet; Take 1 tablet (40 mg) by mouth daily  Dispense: 90 tablet; Refill: 1  - metoprolol tartrate (LOPRESSOR) 25 MG tablet; Take 1 tablet (25 mg) by mouth 2 times daily  Dispense: 180 tablet; Refill: 1    2. Hypothyroidism, unspecified type  Is now on 112 mcg daily for about 8 weeks but he is kind of unsure about this.  Will come back for lab draw next week       No follow-ups on file.      Phone call duration:  8 minutes    Priyanka Noel MD    Start 11:07 AM   Stp 11:15 AM

## 2020-05-07 DIAGNOSIS — E03.9 HYPOTHYROIDISM, UNSPECIFIED TYPE: ICD-10-CM

## 2020-05-07 LAB — TSH SERPL DL<=0.005 MIU/L-ACNC: 0.97 MU/L (ref 0.4–4)

## 2020-05-07 PROCEDURE — 36415 COLL VENOUS BLD VENIPUNCTURE: CPT | Performed by: INTERNAL MEDICINE

## 2020-05-07 PROCEDURE — 84443 ASSAY THYROID STIM HORMONE: CPT | Performed by: INTERNAL MEDICINE

## 2020-05-07 NOTE — LETTER
May 11, 2020      Parish Driscoll  [address] [address]        Dear ,    We are writing to inform you of your test results.    Normal thyroid.        Resulted Orders   **TSH with free T4 reflex FUTURE anytime   Result Value Ref Range    TSH 0.97 0.40 - 4.00 mU/L       If you have any questions or concerns, please call the clinic at the number listed above.       Sincerely,      Priyanka Noel MD /garcia

## 2020-05-13 ENCOUNTER — TELEPHONE (OUTPATIENT)
Dept: INTERNAL MEDICINE | Facility: CLINIC | Age: 85
End: 2020-05-13

## 2020-05-13 ENCOUNTER — DOCUMENTATION ONLY (OUTPATIENT)
Dept: OTHER | Facility: CLINIC | Age: 85
End: 2020-05-13

## 2020-05-13 NOTE — TELEPHONE ENCOUNTER
The POLST we received is not valid.  We can do a virtual appointment for a POLST discussion (which would include mailing him the POLST that is completed in the visit for him to sign) or we can do it in the office next time.

## 2020-05-13 NOTE — TELEPHONE ENCOUNTER
----- Message from Karol Emerson sent at 5/13/2020 11:19 AM CDT -----  Regarding: Invalid POLST 5-5-20  Dr Noel,  We received a POLST dated 5-5-20 for this patient.  Unfortunately it is not valid as Section A was Attempt Resuscitation and Section B was Selective Tx per the POLST is Section A is Attempt Resuscitation then Section B must be Full Tx.  Please complete a new POLST and sent it to [facility] to be added to the patients Epic record.  Thank you

## 2020-05-13 NOTE — TELEPHONE ENCOUNTER
Called patient for phone visit. Patient was not aware that he had visit today and would prefer to complete at his next office visit. Argelia Encarnacion MA

## 2020-05-13 NOTE — TELEPHONE ENCOUNTER
Called and spoke with patient and gave information. Scheduled for appointment today with Dr. Noel.     Ananya Bridges RN

## 2020-06-01 DIAGNOSIS — E03.9 HYPOTHYROIDISM, UNSPECIFIED TYPE: ICD-10-CM

## 2020-06-02 RX ORDER — LEVOTHYROXINE SODIUM 112 UG/1
112 TABLET ORAL DAILY
Qty: 90 TABLET | Refills: 2 | Status: SHIPPED | OUTPATIENT
Start: 2020-06-02 | End: 2021-01-06

## 2020-07-15 ENCOUNTER — TELEPHONE (OUTPATIENT)
Dept: FAMILY MEDICINE | Facility: CLINIC | Age: 85
End: 2020-07-15

## 2020-07-15 NOTE — TELEPHONE ENCOUNTER
Per Stacey, with [facility], patient is in their independent living and manages his own cares and meds  The nurses do not see him regularly as he is independent   They do check his VS periodically. Below are his BP and HR from their log    4/13/2020   /84 HR 66    5/21/2020  /85 HR 52    6/1/2020  /78 HR 56    7/15/2020  /81 HR 50    Patient tells them that these BP readings are his usual    Patient also has ongoing edema but Stacey feels it is worse than what she saw last time- 3+ edema to chris LE  Denies any SOB, pain or discomfort, or any open areas  Patient does not use compressions stockings although it was recommended and ordered   He does put his legs up when he is in his chair      Please call patient directly with any instructions      Sharri Ramirez RN

## 2020-07-15 NOTE — TELEPHONE ENCOUNTER
Reason for call:  Symptom   Symptom or request:  His blood pressure has been 160/81 pulse is 50.     Edema 3 plus chris lower extremities.     Duration (how long have symptoms been present): not sure.     Have you been treated for this before? Yes    Additional comments:  Any     Phone number to reach patient:  Home number on file [phone] (home)    Best Time:   Any     Can we leave a detailed message on this number?  Yes      Travel screening: Not Applicable

## 2020-07-15 NOTE — TELEPHONE ENCOUNTER
Please call patient to schedule him for a virtual visit with a provider to f/u on his swelling and HTN    Sharri Ramirez RN

## 2020-07-22 NOTE — PROGRESS NOTES
Subjective     Parish Driscoll is a 89 year old male who presents to clinic today for the following health issues:      HPI     Patient is here because facility staff report that his BP been running high and has swelling of the legs.   Their BP log shows BP: systolic as high as 162 and diastollic in upper 80's in the last few weeks, previously been normal.      Hypertension Follow-up      Do you check your blood pressure regularly outside of the clinic? No     Are you following a low salt diet? No    Are your blood pressures ever more than 140 on the top number (systolic) OR more   than 90 on the bottom number (diastolic), for example 140/90? Yes      How many servings of fruits and vegetables do you eat daily?  2-3    On average, how many sweetened beverages do you drink each day (Examples: soda, juice, sweet tea, etc.  Do NOT count diet or artificially sweetened beverages)?   0    How many days per week do you exercise enough to make your heart beat faster? 3 or less    How many minutes a day do you exercise enough to make your heart beat faster? 9 or less    How many days per week do you miss taking your medication? 0    Current HTN Medications:  Amlodipine 5 mg  Lisinopril 40  Metoprolol 25 mg    BP Readings from Last 6 Encounters:   07/24/20 130/68   03/09/20 (!) 140/60   09/09/19 132/68   06/05/19 118/74   04/02/19 118/70   12/05/18 126/72     Comprehensive metabolic panel    Hyponatremia; CKD (chronic kidney dis...     Ref Range & Units  4mo ago 8mo ago     Sodium  133 - 144 mmol/L  134   130Low         Potassium  3.4 - 5.3 mmol/L  4.8   4.1       Chloride  94 - 109 mmol/L  101   96       Carbon Dioxide  20 - 32 mmol/L  30   30       Anion Gap  3 - 14 mmol/L  3   4       Glucose  70 - 99 mg/dL  93   63Low         Urea Nitrogen  7 - 30 mg/dL  30   33High         Creatinine  0.66 - 1.25 mg/dL  1.18   1.22       GFR Estimate  >60 mL/min  53Low     51Low   CM     Comment: Non  GFR Calc    Starting 2018, serum creatinine based estimated GFR (eGFR) will be   calculated using the Chronic Kidney Disease Epidemiology Collaboration   (CKD-EPI) equation.      GFR Estimate If Black  >60 mL/min  61   59Low   CM     Comment:  GFR Calc   Starting 2018, serum creatinine based estimated GFR (eGFR) will be   calculated using the Chronic Kidney Disease Epidemiology Collaboration   (CKD-EPI) equation.      Calcium  8.5 - 10.1 mg/dL  9.1   9.5       Bilirubin Total  0.2 - 1.3 mg/dL  0.3        Albumin  3.4 - 5.0 g/dL  3.7        Protein Total  6.8 - 8.8 g/dL  7.3        Alkaline Phosphatase  40 - 150 U/L  50        ALT  0 - 70 U/L  20        AST  0 - 45 U/L  19                 Leg swelling:     Lower legs left more than right    Denies any chest pain, dizziness, palpitations, SOB or orthopnea      Patient Active Problem List   Diagnosis     Hypothyroidism     Essential hypertension with goal blood pressure less than 140/90     Hyperlipidemia with target LDL less than 130     History of prostate cancer     Hyponatremia     Edema     Right thigh pain     Inflammation of joint of right knee     Closed compression fracture of L4 lumbar vertebra with delayed healing, subsequent encounter     Chronic idiopathic constipation     Urinary urgency     Light chain disease (H)     CKD (chronic kidney disease) stage 3, GFR 30-59 ml/min (H)     Protein-calorie malnutrition (H)     Vitamin D deficiency     Peripheral edema     Prostate cancer (H)     Past Surgical History:   Procedure Laterality Date     CYSTECTOMY BRANCHIAL CLEFT  2015     CYSTOSCOPY  2003     TONSILLECTOMY      at age 12       Social History     Tobacco Use     Smoking status: Former Smoker     Years: 15.00     Types: Cigarettes     Last attempt to quit: 1960     Years since quittin.6     Smokeless tobacco: Never Used   Substance Use Topics     Alcohol use: No     Family History   Problem Relation Age of Onset      Hypertension Mother      Hypertension Father      Depression Father      Asthma Sister      Depression Sister      Diabetes Maternal Grandmother      Cancer Maternal Grandfather         Stomach     Heart Disease No family hx of            Reviewed and updated as needed this visit by Provider         Review of Systems    HEENT, cardiovascular, pulmonary, gi and gu systems are negative, except as otherwise noted.      Objective    /68   Pulse 107   Temp 97  F (36.1  C) (Oral)   Wt 65.8 kg (145 lb)   SpO2 97%   BMI 21.41 kg/m    Body mass index is 21.41 kg/m .   142/80  Physical Exam   GENERAL: frail elderly male, alert and no distress  NECK: no adenopathy and thyroid normal to palpation  RESP: lungs clear to auscultation - no rales, rhonchi or wheezes  CV: regular rate and rhythm, no murmur, click or rub, no peripheral edema   ABDOMEN: soft, nontender, no masses and bowel sounds normal  MS: no gross musculoskeletal defects noted, no edema    Diagnostic Test Results:  Results for orders placed or performed in visit on 07/24/20   BNP-N terminal pro     Status: Abnormal   Result Value Ref Range    N-Terminal Pro Bnp 1,154 (H) 0 - 450 pg/mL   Hemoglobin     Status: Abnormal   Result Value Ref Range    Hemoglobin 12.8 (L) 13.3 - 17.7 g/dL         Assessment & Plan     Parish was seen today for hypertension and swelling.    Diagnoses and all orders for this visit:    HTN, goal below 140/90    Patient has borderline BP, heart rate irregular. Has no symptoms. Cardiology evaluation.  -     EKG 12-lead complete w/read - Clinics  -     CARDIOLOGY EVAL ADULT REFERRAL; Future    Leg swelling  -     EKG 12-lead complete w/read - Clinics  -     Hemoglobin  -     CARDIOLOGY EVAL ADULT REFERRAL; Future    Atrial fibrillation, unspecified type (H)   EKG shows atrial flutter, has no chest pain, SOB though has edema. BNP is elevated. This is likely congestive heart failure likely from atrial fibrillation. Discussed evaluation  by cardiology as outpatient as is asymptomatic.   -     BNP-N terminal pro  -     CARDIOLOGY EVAL ADULT REFERRAL; Future    Other heart failure (H)     Swelling likely from sedentary lifestyle or cardiac cause with mild elevation of BNP in setting of atrial flutter.  -     BNP-N terminal pro      Return in about 1 month (around 8/24/2020) for Routine Visit.    Milton Adams MD  [facility]

## 2020-07-24 ENCOUNTER — OFFICE VISIT (OUTPATIENT)
Dept: FAMILY MEDICINE | Facility: CLINIC | Age: 85
End: 2020-07-24
Payer: COMMERCIAL

## 2020-07-24 VITALS
DIASTOLIC BLOOD PRESSURE: 68 MMHG | OXYGEN SATURATION: 97 % | SYSTOLIC BLOOD PRESSURE: 130 MMHG | HEART RATE: 107 BPM | TEMPERATURE: 97 F | BODY MASS INDEX: 21.41 KG/M2 | WEIGHT: 145 LBS

## 2020-07-24 DIAGNOSIS — M79.89 LEG SWELLING: ICD-10-CM

## 2020-07-24 DIAGNOSIS — I10 HTN, GOAL BELOW 140/90: Primary | ICD-10-CM

## 2020-07-24 DIAGNOSIS — I48.91 ATRIAL FIBRILLATION, UNSPECIFIED TYPE (H): ICD-10-CM

## 2020-07-24 DIAGNOSIS — I50.89 OTHER HEART FAILURE (H): ICD-10-CM

## 2020-07-24 LAB
HGB BLD-MCNC: 12.8 G/DL (ref 13.3–17.7)
NT-PROBNP SERPL-MCNC: 1154 PG/ML (ref 0–450)

## 2020-07-24 PROCEDURE — 85018 HEMOGLOBIN: CPT | Performed by: FAMILY MEDICINE

## 2020-07-24 PROCEDURE — 83880 ASSAY OF NATRIURETIC PEPTIDE: CPT | Performed by: FAMILY MEDICINE

## 2020-07-24 PROCEDURE — 36415 COLL VENOUS BLD VENIPUNCTURE: CPT | Performed by: FAMILY MEDICINE

## 2020-07-24 PROCEDURE — 99214 OFFICE O/P EST MOD 30 MIN: CPT | Performed by: FAMILY MEDICINE

## 2020-07-24 PROCEDURE — 93000 ELECTROCARDIOGRAM COMPLETE: CPT | Performed by: FAMILY MEDICINE

## 2020-07-30 ENCOUNTER — TELEPHONE (OUTPATIENT)
Dept: FAMILY MEDICINE | Facility: CLINIC | Age: 85
End: 2020-07-30

## 2020-08-03 ENCOUNTER — VIRTUAL VISIT (OUTPATIENT)
Dept: CARDIOLOGY | Facility: CLINIC | Age: 85
End: 2020-08-03
Payer: COMMERCIAL

## 2020-08-03 DIAGNOSIS — I48.91 ATRIAL FIBRILLATION, UNSPECIFIED TYPE (H): ICD-10-CM

## 2020-08-03 DIAGNOSIS — I10 HTN, GOAL BELOW 140/90: ICD-10-CM

## 2020-08-03 DIAGNOSIS — I48.19 PERSISTENT ATRIAL FIBRILLATION (H): Primary | ICD-10-CM

## 2020-08-03 DIAGNOSIS — M79.89 LEG SWELLING: ICD-10-CM

## 2020-08-03 PROCEDURE — 99214 OFFICE O/P EST MOD 30 MIN: CPT | Mod: 95 | Performed by: INTERNAL MEDICINE

## 2020-08-03 NOTE — LETTER
8/3/2020      RE: Parish Driscoll  [address]  [place] [address]       Dear Colleague,    Thank you for the opportunity to participate in the care of your patient, Parish Driscoll, at the [facility] AT [facility]. Please see a copy of my visit note below.    Parish Driscoll is a 89 year old male who is being evaluated via a billable telephone visit.      Electrophysiology Clinic Telephone Visit    HPI:   I was requested by IVAN Finley to consult on new onset persistent atrial fibrillation    Patient is a 89-year-old gentleman with past medical history including hypertension.  He denies any history of diabetes, coronary heart disease, heart failure, previous stroke or TIA.    On July 24, 2020, during an office visit, patient was diagnosed to have atrial fibrillation on a 12-lead electrocardiogram.  Patient denies any symptoms of irregular heartbeat sensation, palpitations, exertional dyspnea, frequent lightheadedness, presyncope or syncope.    He lives on the fourth floor of an apartment building.  Very frequently, he would climb up the stairs to his apartment to get exercise.  Is able to climb 4 flights of stairs without stopping and without exertional dyspnea or angina.    He is steady on his feet and has not had any unprovoked falls.            PAST MEDICAL HISTORY:  Past Medical History:   Diagnosis Date     Hematuria, microscopic      Hernia, inguinal 6-    Dr. Spann     HTN (hypertension)      Hyperlipidemia LDL goal < 130      Hypertension goal BP (blood pressure) < 140/90      Hypothyroidism     Dr. Gardner/ Dr. Longoria     Prostate cancer (H)        CURRENT MEDICATIONS:  Current Outpatient Medications   Medication Sig Dispense Refill     alendronate (FOSAMAX) 70 MG tablet TAKE 1 TABLET WEEKLY AS DIRECTED. SEE PACKAGE FOR ADDITIONAL INSTRUCTIONS 12 tablet 1     amLODIPine  (NORVASC) 5 MG tablet TAKE 1 TABLET EVERY DAY 90 tablet 1     aspirin 81 MG EC tablet Take 1 tablet (81 mg) by mouth daily 90 tablet 3     levothyroxine (SYNTHROID/LEVOTHROID) 112 MCG tablet Take 1 tablet (112 mcg) by mouth daily 90 tablet 2     lisinopril (ZESTRIL) 40 MG tablet Take 1 tablet (40 mg) by mouth daily 90 tablet 1     metoprolol tartrate (LOPRESSOR) 25 MG tablet Take 1 tablet (25 mg) by mouth 2 times daily 180 tablet 1     vitamin D3 (CHOLECALCIFEROL) 2000 units (50 mcg) tablet Take 1 tablet (2,000 Units) by mouth daily       augmented betamethasone dipropionate (DIPROLENE-AF) 0.05 % external cream APPLY SPARINGLY TO AFFECTED AREA TWICE DAILY AS NEEDED.  DO NOT APPLY TO FACE.  Use for 14 days. 15 g 1       PAST SURGICAL HISTORY:  Past Surgical History:   Procedure Laterality Date     CYSTECTOMY BRANCHIAL CLEFT  2015     CYSTOSCOPY  2003     TONSILLECTOMY      at age 12       ALLERGIES:     Allergies   Allergen Reactions     Mold      Nkda [No Known Drug Allergies]      Seasonal Allergies Other (See Comments)     Runny nose       FAMILY HISTORY:  Family History   Problem Relation Age of Onset     Hypertension Mother      Hypertension Father      Depression Father      Asthma Sister      Depression Sister      Diabetes Maternal Grandmother      Cancer Maternal Grandfather         Stomach     Heart Disease No family hx of        SOCIAL HISTORY:  Social History     Tobacco Use     Smoking status: Former Smoker     Years: 15.00     Types: Cigarettes     Last attempt to quit: 1960     Years since quittin.6     Smokeless tobacco: Never Used   Substance Use Topics     Alcohol use: No     Drug use: No       ROS:  10 point ROS neg other than the symptoms noted above in the HPI.    Labs:  Reviewed.       Assessment and Plan:   New onset persistent atrial fibrillation    I discussed extensively with patient the implications of this diagnosis.  I discussed extensively the next steps:  - A  transthoracic echocardiogram to define the structure and function of the heart  - I discussed the aims, risks, and alternatives to starting an oral anticoagulant such as apixaban to prevent cardioembolic stroke.  After an extensive discussion, patient would like to start apixaban 5 mg twice daily.  Patient will stop aspirin when he starts the apixaban.    I will see patient again virtually in approximately 6 months.  Prior to that visit patient should have a basic metabolic panel to assess the creatinine given that he is on apixaban.    All questions and concerns were addressed and patient is happy with plan.        I have reviewed the note as documented above.  This accurately captures the substance of my conversation with the patient.  The patient states understanding and is agreeable with the plan.       Telephone Visit Duration: 10 minutes        IVAN LATIF      Please do not hesitate to contact me if you have any questions/concerns.     Sincerely,     Josee Arora MD

## 2020-08-03 NOTE — PATIENT INSTRUCTIONS
Thank you for coming to the [facility] @ [facility]; please note the following instructions:    1.  A transthoracic echocardiogram to define the structure and function of the heart     2. start apixaban 5 mg twice daily.  Patient will stop aspirin when he starts the apixaban.     3. Virtual clinic visit with DR Arora  in approximately 6 months on 2/15/20 at 11:ooam in [place].  Prior to visit  basic metabolic panel lab test to assess the creatinine given that he is on apixaban.    4. Lab. Appointment in 6 months prior to DR Arora visit scheduled for Feb. 5 at 11:00am in [place]    If you have any questions regarding your visit please contact your care team:     Cardiology  Telephone Number   Mikayla GEORGE., RN  Liz SINGH,RN  Nevaeh KEMP, DREAD VICK, CHOCO MACARIO, N   [phone]   (select option 1)    *After hours: [phone]     For scheduling appts:     [phone] or    [phone] (select option 1)    *After hours: [phone]     For the Device Clinic (Pacemakers and ICD's)  RN's :  Evelia Gupta   During business hours: [phone]    *After business hours:  [phone] (select option 4)      Normal test result notifications will be released via [employer] or mailed within 7 business days.  All other test results, will be communicated via telephone once reviewed by your cardiologist.    If you need a medication refill please contact your pharmacy.  Please allow 3 business days for your refill to be completed.    As always, thank you for trusting us with your health care needs!

## 2020-08-03 NOTE — NURSING NOTE
"Chief Complaint   Patient presents with     Atrial Fib      - Leg swelling ,HTN., Afib. referral.Per patient sob. with exercise at times       Initial There were no vitals taken for this visit. Estimated body mass index is 21.41 kg/m  as calculated from the following:    Height as of 3/9/20: 1.753 m (5' 9\").    Weight as of 7/24/20: 65.8 kg (145 lb)..  BP completed using cuff size: angelo Rosales L.P.N.    "

## 2020-08-03 NOTE — PROGRESS NOTES
"Parish Driscoll is a 89 year old male who is being evaluated via a billable telephone visit.      The patient has been notified of following:     \"This telephone visit will be conducted via a call between you and your physician/provider. We have found that certain health care needs can be provided without the need for a physical exam.  This service lets us provide the care you need with a short phone conversation.  If a prescription is necessary we can send it directly to your pharmacy.  If lab work is needed we can place an order for that and you can then stop by our lab to have the test done at a later time.    Telephone visits are billed at different rates depending on your insurance coverage. During this emergency period, for some insurers they may be billed the same as an in-person visit.  Please reach out to your insurance provider with any questions.    If during the course of the call the physician/provider feels a telephone visit is not appropriate, you will not be charged for this service.\"    Patient has given verbal consent for Telephone visit?  Yes    What phone number would you like to be contacted at? [phone]    How would you like to obtain your AVS? Mail a copy    Phone call duration: 10 minutes    Electrophysiology Clinic Telephone Visit    Parish Driscoll is a 89 year old male who is being evaluated via a billable telephone visit.      The patient has been notified of following:     \"This telephone visit will be conducted via a call between you and your physician/provider. We have found that certain health care needs can be provided without the need for a physical exam.  This service lets us provide the care you need with a short phone conversation.  If a prescription is necessary we can send it directly to your pharmacy.  If lab work is needed we can place an order for that and you can then stop by our lab to have the test done at a later time.    If during the course of the call the " "physician/provider feels a telephone visit is not appropriate, you will not be charged for this service.\"   Patient has given verbal consent for Telephone visit?  Yes    HPI:   I was requested by IVAN Finley to consult on new onset persistent atrial fibrillation    Patient is a 89-year-old gentleman with past medical history including hypertension.  He denies any history of diabetes, coronary heart disease, heart failure, previous stroke or TIA.    On July 24, 2020, during an office visit, patient was diagnosed to have atrial fibrillation on a 12-lead electrocardiogram.  Patient denies any symptoms of irregular heartbeat sensation, palpitations, exertional dyspnea, frequent lightheadedness, presyncope or syncope.    He lives on the fourth floor of an apartment building.  Very frequently, he would climb up the stairs to his apartment to get exercise.  Is able to climb 4 flights of stairs without stopping and without exertional dyspnea or angina.    He is steady on his feet and has not had any unprovoked falls.            PAST MEDICAL HISTORY:  Past Medical History:   Diagnosis Date     Hematuria, microscopic      Hernia, inguinal 6-    Dr. Spann     HTN (hypertension)      Hyperlipidemia LDL goal < 130      Hypertension goal BP (blood pressure) < 140/90      Hypothyroidism     Dr. Gardner/ Dr. Longoria     Prostate cancer (H)        CURRENT MEDICATIONS:  Current Outpatient Medications   Medication Sig Dispense Refill     alendronate (FOSAMAX) 70 MG tablet TAKE 1 TABLET WEEKLY AS DIRECTED. SEE PACKAGE FOR ADDITIONAL INSTRUCTIONS 12 tablet 1     amLODIPine (NORVASC) 5 MG tablet TAKE 1 TABLET EVERY DAY 90 tablet 1     aspirin 81 MG EC tablet Take 1 tablet (81 mg) by mouth daily 90 tablet 3     levothyroxine (SYNTHROID/LEVOTHROID) 112 MCG tablet Take 1 tablet (112 mcg) by mouth daily 90 tablet 2     lisinopril (ZESTRIL) 40 MG tablet Take 1 tablet (40 mg) by mouth daily 90 tablet 1     metoprolol " tartrate (LOPRESSOR) 25 MG tablet Take 1 tablet (25 mg) by mouth 2 times daily 180 tablet 1     vitamin D3 (CHOLECALCIFEROL) 2000 units (50 mcg) tablet Take 1 tablet (2,000 Units) by mouth daily       augmented betamethasone dipropionate (DIPROLENE-AF) 0.05 % external cream APPLY SPARINGLY TO AFFECTED AREA TWICE DAILY AS NEEDED.  DO NOT APPLY TO FACE.  Use for 14 days. 15 g 1       PAST SURGICAL HISTORY:  Past Surgical History:   Procedure Laterality Date     CYSTECTOMY BRANCHIAL CLEFT  2015     CYSTOSCOPY  2003     TONSILLECTOMY      at age 12       ALLERGIES:     Allergies   Allergen Reactions     Mold      Nkda [No Known Drug Allergies]      Seasonal Allergies Other (See Comments)     Runny nose       FAMILY HISTORY:  Family History   Problem Relation Age of Onset     Hypertension Mother      Hypertension Father      Depression Father      Asthma Sister      Depression Sister      Diabetes Maternal Grandmother      Cancer Maternal Grandfather         Stomach     Heart Disease No family hx of        SOCIAL HISTORY:  Social History     Tobacco Use     Smoking status: Former Smoker     Years: 15.00     Types: Cigarettes     Last attempt to quit: 1960     Years since quittin.6     Smokeless tobacco: Never Used   Substance Use Topics     Alcohol use: No     Drug use: No       ROS:  10 point ROS neg other than the symptoms noted above in the HPI.    Labs:  Reviewed.       Assessment and Plan:   New onset persistent atrial fibrillation    I discussed extensively with patient the implications of this diagnosis.  I discussed extensively the next steps:  - A transthoracic echocardiogram to define the structure and function of the heart  - I discussed the aims, risks, and alternatives to starting an oral anticoagulant such as apixaban to prevent cardioembolic stroke.  After an extensive discussion, patient would like to start apixaban 5 mg twice daily.  Patient will stop aspirin when he starts the  apixaban.    I will see patient again virtually in approximately 6 months.  Prior to that visit patient should have a basic metabolic panel to assess the creatinine given that he is on apixaban.    All questions and concerns were addressed and patient is happy with plan.        I have reviewed the note as documented above.  This accurately captures the substance of my conversation with the patient.  The patient states understanding and is agreeable with the plan.       Telephone Visit Duration: 10 minutes          IVAN LATIF

## 2020-08-07 ENCOUNTER — ANCILLARY PROCEDURE (OUTPATIENT)
Dept: CARDIOLOGY | Facility: CLINIC | Age: 85
End: 2020-08-07
Attending: INTERNAL MEDICINE
Payer: COMMERCIAL

## 2020-08-07 DIAGNOSIS — I48.19 PERSISTENT ATRIAL FIBRILLATION (H): ICD-10-CM

## 2020-08-07 DIAGNOSIS — I48.91 ATRIAL FIBRILLATION, UNSPECIFIED TYPE (H): ICD-10-CM

## 2020-08-07 DIAGNOSIS — M79.89 LEG SWELLING: ICD-10-CM

## 2020-08-07 DIAGNOSIS — I10 HTN, GOAL BELOW 140/90: ICD-10-CM

## 2020-08-07 PROCEDURE — 93306 TTE W/DOPPLER COMPLETE: CPT | Performed by: INTERNAL MEDICINE

## 2020-08-18 ENCOUNTER — TELEPHONE (OUTPATIENT)
Dept: CARDIOLOGY | Facility: CLINIC | Age: 85
End: 2020-08-18

## 2020-08-18 NOTE — TELEPHONE ENCOUNTER
[facility] Call Center    Phone Message    May a detailed message be left on voicemail: yes     Reason for Call: Requesting Results   Name/type of test: Echocardiogram Complete  Date of test: 8/7/20  Was test done at a location other than [facility] (Please fill in the location if not [facility])?: No  Deanne is Parish's sister, and she is calling to request a call back for Parish from the care team. Deanne says Parish would like to receive a call back to receive his results. Parish can be reached at [phone]. Please give Parish a call back at your earliest convenience.      Action Taken: Message routed to:  Other: [facility]    Travel Screening: Not Applicable

## 2020-10-02 NOTE — PROGRESS NOTES
Subjective   Parish Driscoll is a 89 year old male who presents to clinic today for the following health issues:  HPI          Patient was seen on 7/24/20 with hypertension, edema and short of breath.  He was found to be in chf due to a fib.     On 8/3/2020 he had a virtual appointment with Dr. Timmons in cardiology. He received an exo and apixaban 5 mg bid. TTE had normal LV function.    He didn't take the alendronate as he was too scared that it would cause his shins to break.        Hyperlipidemia Follow-Up    Are you regularly taking any medication or supplement to lower your cholesterol?   No    Are you having muscle aches or other side effects that you think could be caused by your cholesterol lowering medication?  No    Hypertension Follow-up    Do you check your blood pressure regularly outside of the clinic? No     Are you following a low salt diet? No    Are your blood pressures ever more than 140 on the top number (systolic) OR more   than 90 on the bottom number (diastolic), for example 140/90? No    Chronic Kidney Disease Follow-up    Do you take any over the counter pain medicine?: No    Hypothyroidism Follow-up    Since last visit, patient describes the following symptoms: constipation and fatigue      How many servings of fruits and vegetables do you eat daily?  2-3    On average, how many sweetened beverages do you drink each day (Examples: soda, juice, sweet tea, etc.  Do NOT count diet or artificially sweetened beverages)?   0  - coffee and ginger ale     How many days per week do you exercise enough to make your heart beat faster? 3 or less    How many minutes a day do you exercise enough to make your heart beat faster? 10 - 19    How many days per week do you miss taking your medication? 0        Review of Systems    ROS: 10 point ROS neg other than the symptoms noted above in the HPI.       Objective    There were no vitals taken for this visit.  There is no height or weight on file to calculate  BMI.  Physical Exam   GENERAL APPEARANCE: healthy, alert and no distress  NECK: no adenopathy, no asymmetry, masses, or scars and thyroid normal to palpation  RESP: lungs clear to auscultation - no rales, rhonchi or wheezes  CV: regular rates and rhythm and normal S1 S2, no S3 or S4    SKIN: no suspicious lesions or rashes  PSYCH: mentation appears normal. and affect normal/bright  1+ ankle edema bilateral     No results found for any visits on 10/05/20.        Assessment & Plan     Osteoporosis without current pathological fracture, unspecified osteoporosis type  He didn't take the fosamax as he was too scared of the side effects.  However, after re-explaining the side effects and benefits, he will consider taking it again.    - alendronate (FOSAMAX) 70 MG tablet; TAKE 1 TABLET WEEKLY AS DIRECTED. SEE PACKAGE FOR ADDITIONAL INSTRUCTIONS  Do not fill unless patient requests    Essential hypertension with goal blood pressure less than 140/90  Well controlled with medications without side effects.   - metoprolol tartrate (LOPRESSOR) 25 MG tablet; Take 1 tablet (25 mg) by mouth 2 times daily  - lisinopril (ZESTRIL) 40 MG tablet; Take 1 tablet (40 mg) by mouth daily  - amLODIPine (NORVASC) 5 MG tablet; TAKE 1 TABLET EVERY DAY    Stage 3a chronic kidney disease  Stable.  Labs in the spring    A fib - Well controlled with medications without side effects. rsr today        There are no Patient Instructions on file for this visit.     Return in about 6 months (around 4/5/2021) for Routine Visit, Lab Work.    Priyanka Noel MD  [facility]

## 2020-10-05 ENCOUNTER — OFFICE VISIT (OUTPATIENT)
Dept: INTERNAL MEDICINE | Facility: CLINIC | Age: 85
End: 2020-10-05
Payer: COMMERCIAL

## 2020-10-05 VITALS
OXYGEN SATURATION: 95 % | WEIGHT: 147.5 LBS | BODY MASS INDEX: 21.84 KG/M2 | RESPIRATION RATE: 19 BRPM | SYSTOLIC BLOOD PRESSURE: 124 MMHG | DIASTOLIC BLOOD PRESSURE: 62 MMHG | HEART RATE: 55 BPM | HEIGHT: 69 IN | TEMPERATURE: 98.5 F

## 2020-10-05 DIAGNOSIS — N18.31 STAGE 3A CHRONIC KIDNEY DISEASE (H): ICD-10-CM

## 2020-10-05 DIAGNOSIS — I10 ESSENTIAL HYPERTENSION WITH GOAL BLOOD PRESSURE LESS THAN 140/90: ICD-10-CM

## 2020-10-05 DIAGNOSIS — M81.0 OSTEOPOROSIS WITHOUT CURRENT PATHOLOGICAL FRACTURE, UNSPECIFIED OSTEOPOROSIS TYPE: ICD-10-CM

## 2020-10-05 DIAGNOSIS — I48.20 ATRIAL FIBRILLATION, CHRONIC (H): Primary | ICD-10-CM

## 2020-10-05 PROCEDURE — 99214 OFFICE O/P EST MOD 30 MIN: CPT | Performed by: INTERNAL MEDICINE

## 2020-10-05 RX ORDER — LISINOPRIL 40 MG/1
40 TABLET ORAL DAILY
Qty: 90 TABLET | Refills: 1 | Status: SHIPPED | OUTPATIENT
Start: 2020-10-05 | End: 2021-04-19

## 2020-10-05 RX ORDER — ALENDRONATE SODIUM 70 MG/1
TABLET ORAL
Qty: 12 TABLET | Refills: 3 | Status: SHIPPED | OUTPATIENT
Start: 2020-10-05 | End: 2021-04-19

## 2020-10-05 RX ORDER — METOPROLOL TARTRATE 25 MG/1
25 TABLET, FILM COATED ORAL 2 TIMES DAILY
Qty: 180 TABLET | Refills: 1 | Status: SHIPPED | OUTPATIENT
Start: 2020-10-05 | End: 2021-04-19

## 2020-10-05 RX ORDER — AMLODIPINE BESYLATE 5 MG/1
TABLET ORAL
Qty: 90 TABLET | Refills: 1 | Status: SHIPPED | OUTPATIENT
Start: 2020-10-05 | End: 2021-04-19

## 2020-10-05 ASSESSMENT — MIFFLIN-ST. JEOR: SCORE: 1304.44

## 2020-10-05 ASSESSMENT — PAIN SCALES - GENERAL: PAINLEVEL: NO PAIN (0)

## 2020-11-04 DIAGNOSIS — M81.0 OSTEOPOROSIS WITHOUT CURRENT PATHOLOGICAL FRACTURE, UNSPECIFIED OSTEOPOROSIS TYPE: ICD-10-CM

## 2020-11-04 RX ORDER — ALENDRONATE SODIUM 70 MG/1
TABLET ORAL
Qty: 12 TABLET | Refills: 3 | OUTPATIENT
Start: 2020-11-04

## 2021-01-04 DIAGNOSIS — E03.9 HYPOTHYROIDISM, UNSPECIFIED TYPE: ICD-10-CM

## 2021-01-06 RX ORDER — LEVOTHYROXINE SODIUM 112 UG/1
TABLET ORAL
Qty: 90 TABLET | Refills: 0 | Status: SHIPPED | OUTPATIENT
Start: 2021-01-06 | End: 2021-04-15

## 2021-02-03 DIAGNOSIS — I10 ESSENTIAL HYPERTENSION WITH GOAL BLOOD PRESSURE LESS THAN 140/90: ICD-10-CM

## 2021-02-03 LAB
ANION GAP SERPL CALCULATED.3IONS-SCNC: 3 MMOL/L (ref 3–14)
BUN SERPL-MCNC: 28 MG/DL (ref 7–30)
CALCIUM SERPL-MCNC: 8.9 MG/DL (ref 8.5–10.1)
CHLORIDE SERPL-SCNC: 104 MMOL/L (ref 94–109)
CO2 SERPL-SCNC: 31 MMOL/L (ref 20–32)
CREAT SERPL-MCNC: 1 MG/DL (ref 0.66–1.25)
GFR SERPL CREATININE-BSD FRML MDRD: 64 ML/MIN/{1.73_M2}
GLUCOSE SERPL-MCNC: 114 MG/DL (ref 70–99)
POTASSIUM SERPL-SCNC: 4.1 MMOL/L (ref 3.4–5.3)
SODIUM SERPL-SCNC: 138 MMOL/L (ref 133–144)

## 2021-02-03 PROCEDURE — 80048 BASIC METABOLIC PNL TOTAL CA: CPT | Performed by: INTERNAL MEDICINE

## 2021-02-03 PROCEDURE — 36415 COLL VENOUS BLD VENIPUNCTURE: CPT | Performed by: INTERNAL MEDICINE

## 2021-02-03 NOTE — LETTER
February 4, 2021    Parish Driscoll  [address] [place] [address]          Dear ,    We are writing to inform you of your test results.  Normal electrolytes. Normal kidney function.       Resulted Orders   Basic metabolic panel  (Ca, Cl, CO2, Creat, Gluc, K, Na, BUN)   Result Value Ref Range    Sodium 138 133 - 144 mmol/L    Potassium 4.1 3.4 - 5.3 mmol/L    Chloride 104 94 - 109 mmol/L    Carbon Dioxide 31 20 - 32 mmol/L    Anion Gap 3 3 - 14 mmol/L    Glucose 114 (H) 70 - 99 mg/dL      Comment:      Non Fasting    Urea Nitrogen 28 7 - 30 mg/dL    Creatinine 1.00 0.66 - 1.25 mg/dL    GFR Estimate 64 >60 mL/min/[1.73_m2]      Comment:      Non  GFR Calc  Starting 12/18/2018, serum creatinine based estimated GFR (eGFR) will be   calculated using the Chronic Kidney Disease Epidemiology Collaboration   (CKD-EPI) equation.      GFR Estimate If Black 74 >60 mL/min/[1.73_m2]      Comment:       GFR Calc  Starting 12/18/2018, serum creatinine based estimated GFR (eGFR) will be   calculated using the Chronic Kidney Disease Epidemiology Collaboration   (CKD-EPI) equation.      Calcium 8.9 8.5 - 10.1 mg/dL       If you have any questions or concerns, please call the clinic at the number listed above.       Sincerely,      Priyanka Noel MD

## 2021-02-15 ENCOUNTER — VIRTUAL VISIT (OUTPATIENT)
Dept: CARDIOLOGY | Facility: CLINIC | Age: 86
End: 2021-02-15
Payer: COMMERCIAL

## 2021-02-15 DIAGNOSIS — I48.19 PERSISTENT ATRIAL FIBRILLATION (H): Primary | ICD-10-CM

## 2021-02-15 DIAGNOSIS — I48.0 PAROXYSMAL ATRIAL FIBRILLATION (H): ICD-10-CM

## 2021-02-15 PROCEDURE — 99213 OFFICE O/P EST LOW 20 MIN: CPT | Mod: 95 | Performed by: INTERNAL MEDICINE

## 2021-02-15 NOTE — PROGRESS NOTES
"Parish is a 89 year old who is being evaluated via a billable telephone visit.      What phone number would you like to be contacted at? [phone]  How would you like to obtain your AVS? Mail a copy  Phone call duration: 6 minutes    Electrophysiology Clinic Telephone Visit    Parish Driscoll is a 89 year old male who is being evaluated via a billable telephone visit.      The patient has been notified of following:     \"This telephone visit will be conducted via a call between you and your physician/provider. We have found that certain health care needs can be provided without the need for a physical exam.  This service lets us provide the care you need with a short phone conversation.  If a prescription is necessary we can send it directly to your pharmacy.  If lab work is needed we can place an order for that and you can then stop by our lab to have the test done at a later time.    If during the course of the call the physician/provider feels a telephone visit is not appropriate, you will not be charged for this service.\"   Patient has given verbal consent for Telephone visit?  Yes    HPI: Purpose of visit: Follow-up of persistent atrial fibrillation    Patient is a 89-year-old gentleman with past medical history including hypertension.  He denies any history of diabetes, coronary heart disease, heart failure, previous stroke or TIA.     On July 24, 2020, during an office visit, patient was diagnosed to have atrial fibrillation on a 12-lead electrocardiogram.  Patient denies any symptoms of irregular heartbeat sensation, palpitations, exertional dyspnea, frequent lightheadedness, presyncope or syncope.     He lives on the fourth floor of an apartment building.  Very frequently, he would climb up the stairs to his apartment to get exercise.  Is able to climb 4 flights of stairs without stopping and without exertional dyspnea or angina.    Patient's last visit with me was in August 2020.  At that time, we " started apixaban 5 mg twice daily for stroke prophylaxis.  In the last 6 months, patient has been doing well.  He has not had any falls.  He did not report any symptoms of irregular heartbeat sensation, prolonged palpitations, exertional angina, frequent lightheadedness, presyncope or syncope.      PAST MEDICAL HISTORY:  Past Medical History:   Diagnosis Date     Hematuria, microscopic      Hernia, inguinal 6-    Dr. Spann     HTN (hypertension)      Hyperlipidemia LDL goal < 130      Hypertension goal BP (blood pressure) < 140/90      Hypothyroidism     Dr. Gardner/ Dr. Longoria     Prostate cancer (H)        CURRENT MEDICATIONS:  Current Outpatient Medications   Medication Sig Dispense Refill     amLODIPine (NORVASC) 5 MG tablet TAKE 1 TABLET EVERY DAY 90 tablet 1     apixaban ANTICOAGULANT (ELIQUIS ANTICOAGULANT) 5 MG tablet Take 1 tablet (5 mg) by mouth 2 times daily 180 tablet 3     augmented betamethasone dipropionate (DIPROLENE-AF) 0.05 % external cream APPLY SPARINGLY TO AFFECTED AREA TWICE DAILY AS NEEDED.  DO NOT APPLY TO FACE.  Use for 14 days. 15 g 1     levothyroxine (SYNTHROID/LEVOTHROID) 112 MCG tablet TAKE 1 TABLET EVERY DAY 90 tablet 0     lisinopril (ZESTRIL) 40 MG tablet Take 1 tablet (40 mg) by mouth daily 90 tablet 1     metoprolol tartrate (LOPRESSOR) 25 MG tablet Take 1 tablet (25 mg) by mouth 2 times daily 180 tablet 1     vitamin D3 (CHOLECALCIFEROL) 2000 units (50 mcg) tablet Take 1 tablet (2,000 Units) by mouth daily       alendronate (FOSAMAX) 70 MG tablet TAKE 1 TABLET WEEKLY AS DIRECTED. SEE PACKAGE FOR ADDITIONAL INSTRUCTIONS  Do not fill unless patient requests (Patient not taking: Reported on 2/15/2021) 12 tablet 3       PAST SURGICAL HISTORY:  Past Surgical History:   Procedure Laterality Date     CYSTECTOMY BRANCHIAL CLEFT  11/2015     CYSTOSCOPY  06/2003     TONSILLECTOMY      at age 12       ALLERGIES:     Allergies   Allergen Reactions     Mold      Nkda [No Known  Drug Allergies]      Seasonal Allergies Other (See Comments)     Runny nose       FAMILY HISTORY:  Family History   Problem Relation Age of Onset     Hypertension Mother      Hypertension Father      Depression Father      Asthma Sister      Depression Sister      Diabetes Maternal Grandmother      Cancer Maternal Grandfather         Stomach     Heart Disease No family hx of        SOCIAL HISTORY:  Social History     Tobacco Use     Smoking status: Former Smoker     Years: 15.00     Types: Cigarettes     Quit date: 1960     Years since quittin.1     Smokeless tobacco: Never Used   Substance Use Topics     Alcohol use: No     Drug use: No       ROS:  10 point ROS neg other than the symptoms noted above in the HPI.    Labs:  Reviewed.         Assessment and Plan:     Persistent atrial fibrillation    It is encouraging the patient is doing well.  We will continue with current treatment and patient will return to see general cardiology, Dr. Timmons in approximately 6 months.  This will be an in person clinic visit with a basic metabolic panel prior to that visit and a 12-lead ECG during the clinic visit.    All questions and concerns were addressed and patient is happy with the plan.      Telephone Visit Duration: 6 minutes

## 2021-02-15 NOTE — LETTER
"2/15/2021      RE: Parish Driscoll  [address] [place] [address]       Dear Colleague,    Thank you for the opportunity to participate in the care of your patient, Parish Driscoll, at the [facility] HEART CLINIC [facility]. Please see a copy of my visit note below.    Parish is a 89 year old who is being evaluated via a billable telephone visit.      What phone number would you like to be contacted at? [phone]  How would you like to obtain your AVS? Mail a copy  Phone call duration: 6 minutes    Electrophysiology Clinic Telephone Visit    Parish Driscoll is a 89 year old male who is being evaluated via a billable telephone visit.      The patient has been notified of following:     \"This telephone visit will be conducted via a call between you and your physician/provider. We have found that certain health care needs can be provided without the need for a physical exam.  This service lets us provide the care you need with a short phone conversation.  If a prescription is necessary we can send it directly to your pharmacy.  If lab work is needed we can place an order for that and you can then stop by our lab to have the test done at a later time.    If during the course of the call the physician/provider feels a telephone visit is not appropriate, you will not be charged for this service.\"   Patient has given verbal consent for Telephone visit?  Yes    HPI: Purpose of visit: Follow-up of persistent atrial fibrillation    Patient is a 89-year-old gentleman with past medical history including hypertension.  He denies any history of diabetes, coronary heart disease, heart failure, previous stroke or TIA.     On July 24, 2020, during an office visit, patient was diagnosed to have atrial fibrillation on a 12-lead electrocardiogram.  Patient denies any symptoms of irregular heartbeat sensation, palpitations, exertional " dyspnea, frequent lightheadedness, presyncope or syncope.     He lives on the fourth floor of an apartment building.  Very frequently, he would climb up the stairs to his apartment to get exercise.  Is able to climb 4 flights of stairs without stopping and without exertional dyspnea or angina.    Patient's last visit with me was in August 2020.  At that time, we started apixaban 5 mg twice daily for stroke prophylaxis.  In the last 6 months, patient has been doing well.  He has not had any falls.  He did not report any symptoms of irregular heartbeat sensation, prolonged palpitations, exertional angina, frequent lightheadedness, presyncope or syncope.      PAST MEDICAL HISTORY:  Past Medical History:   Diagnosis Date     Hematuria, microscopic      Hernia, inguinal 6-    Dr. Spann     HTN (hypertension)      Hyperlipidemia LDL goal < 130      Hypertension goal BP (blood pressure) < 140/90      Hypothyroidism     Dr. Gardner/ Dr. Longoria     Prostate cancer (H)        CURRENT MEDICATIONS:  Current Outpatient Medications   Medication Sig Dispense Refill     amLODIPine (NORVASC) 5 MG tablet TAKE 1 TABLET EVERY DAY 90 tablet 1     apixaban ANTICOAGULANT (ELIQUIS ANTICOAGULANT) 5 MG tablet Take 1 tablet (5 mg) by mouth 2 times daily 180 tablet 3     augmented betamethasone dipropionate (DIPROLENE-AF) 0.05 % external cream APPLY SPARINGLY TO AFFECTED AREA TWICE DAILY AS NEEDED.  DO NOT APPLY TO FACE.  Use for 14 days. 15 g 1     levothyroxine (SYNTHROID/LEVOTHROID) 112 MCG tablet TAKE 1 TABLET EVERY DAY 90 tablet 0     lisinopril (ZESTRIL) 40 MG tablet Take 1 tablet (40 mg) by mouth daily 90 tablet 1     metoprolol tartrate (LOPRESSOR) 25 MG tablet Take 1 tablet (25 mg) by mouth 2 times daily 180 tablet 1     vitamin D3 (CHOLECALCIFEROL) 2000 units (50 mcg) tablet Take 1 tablet (2,000 Units) by mouth daily       alendronate (FOSAMAX) 70 MG tablet TAKE 1 TABLET WEEKLY AS DIRECTED. SEE PACKAGE FOR ADDITIONAL  INSTRUCTIONS  Do not fill unless patient requests (Patient not taking: Reported on 2/15/2021) 12 tablet 3       PAST SURGICAL HISTORY:  Past Surgical History:   Procedure Laterality Date     CYSTECTOMY BRANCHIAL CLEFT  2015     CYSTOSCOPY  2003     TONSILLECTOMY      at age 12       ALLERGIES:     Allergies   Allergen Reactions     Mold      Nkda [No Known Drug Allergies]      Seasonal Allergies Other (See Comments)     Runny nose       FAMILY HISTORY:  Family History   Problem Relation Age of Onset     Hypertension Mother      Hypertension Father      Depression Father      Asthma Sister      Depression Sister      Diabetes Maternal Grandmother      Cancer Maternal Grandfather         Stomach     Heart Disease No family hx of        SOCIAL HISTORY:  Social History     Tobacco Use     Smoking status: Former Smoker     Years: 15.00     Types: Cigarettes     Quit date: 1960     Years since quittin.1     Smokeless tobacco: Never Used   Substance Use Topics     Alcohol use: No     Drug use: No       ROS:  10 point ROS neg other than the symptoms noted above in the HPI.    Labs:  Reviewed.         Assessment and Plan:     Persistent atrial fibrillation    It is encouraging the patient is doing well.  We will continue with current treatment and patient will return to see general cardiology, Dr. Timmons in approximately 6 months.  This will be an in person clinic visit with a basic metabolic panel prior to that visit and a 12-lead ECG during the clinic visit.    All questions and concerns were addressed and patient is happy with the plan.      Telephone Visit Duration: 6 minutes        Please do not hesitate to contact me if you have any questions/concerns.     Sincerely,     Josee Arora MD

## 2021-02-15 NOTE — PATIENT INSTRUCTIONS
Thank you for coming to the [facility] @ [facility]; please note the following instructions:    1. Follow up in 6 months with general cardiology, Dr. Timmons.  You will need labs prior      If you have any questions regarding your visit please contact your care team:     Cardiology  Telephone Number   Mikayla MALAGON, RN  Liz SINGH, RN   Nevaeh KEMP, RMROEL VICK, RMA  Isa MACARIO, LPN   [phone] (option 1)   For scheduling appts:     [phone] (select option 1)       For the Device Clinic (Pacemakers and ICD's)  RN's :  Evelia Gupta   During business hours: [phone]    *After business hours:  [phone] (select option 4)      Normal test result notifications will be released via [employer] or mailed within 7 business days.  All other test results, will be communicated via telephone once reviewed by your cardiologist.    If you need a medication refill please contact your pharmacy.  Please allow 3 business days for your refill to be completed.    As always, thank you for trusting us with your health care needs!

## 2021-03-13 ENCOUNTER — TRANSFERRED RECORDS (OUTPATIENT)
Dept: HEALTH INFORMATION MANAGEMENT | Facility: CLINIC | Age: 86
End: 2021-03-13

## 2021-04-14 DIAGNOSIS — E03.9 HYPOTHYROIDISM, UNSPECIFIED TYPE: ICD-10-CM

## 2021-04-15 RX ORDER — LEVOTHYROXINE SODIUM 112 UG/1
TABLET ORAL
Qty: 30 TABLET | Refills: 0 | Status: SHIPPED | OUTPATIENT
Start: 2021-04-15 | End: 2021-04-19

## 2021-04-19 ENCOUNTER — OFFICE VISIT (OUTPATIENT)
Dept: FAMILY MEDICINE | Facility: CLINIC | Age: 86
End: 2021-04-19
Payer: COMMERCIAL

## 2021-04-19 VITALS
SYSTOLIC BLOOD PRESSURE: 136 MMHG | WEIGHT: 152 LBS | BODY MASS INDEX: 22.51 KG/M2 | HEART RATE: 65 BPM | HEIGHT: 69 IN | TEMPERATURE: 98.1 F | OXYGEN SATURATION: 96 % | DIASTOLIC BLOOD PRESSURE: 71 MMHG | RESPIRATION RATE: 15 BRPM

## 2021-04-19 DIAGNOSIS — N18.31 STAGE 3A CHRONIC KIDNEY DISEASE (H): ICD-10-CM

## 2021-04-19 DIAGNOSIS — Z71.89 COUNSELING REGARDING END OF LIFE DECISION MAKING: Primary | ICD-10-CM

## 2021-04-19 DIAGNOSIS — D80.8 LIGHT CHAIN DISEASE (H): ICD-10-CM

## 2021-04-19 DIAGNOSIS — I48.19 PERSISTENT ATRIAL FIBRILLATION (H): ICD-10-CM

## 2021-04-19 DIAGNOSIS — C61 PROSTATE CANCER (H): ICD-10-CM

## 2021-04-19 DIAGNOSIS — E03.9 HYPOTHYROIDISM, UNSPECIFIED TYPE: ICD-10-CM

## 2021-04-19 DIAGNOSIS — E44.1 MILD PROTEIN-CALORIE MALNUTRITION (H): ICD-10-CM

## 2021-04-19 DIAGNOSIS — I50.89 OTHER HEART FAILURE (H): ICD-10-CM

## 2021-04-19 DIAGNOSIS — I10 ESSENTIAL HYPERTENSION WITH GOAL BLOOD PRESSURE LESS THAN 140/90: ICD-10-CM

## 2021-04-19 LAB
ANION GAP SERPL CALCULATED.3IONS-SCNC: 3 MMOL/L (ref 3–14)
BASOPHILS # BLD AUTO: 0 10E9/L (ref 0–0.2)
BASOPHILS NFR BLD AUTO: 0.8 %
BUN SERPL-MCNC: 36 MG/DL (ref 7–30)
CALCIUM SERPL-MCNC: 9.7 MG/DL (ref 8.5–10.1)
CHLORIDE SERPL-SCNC: 103 MMOL/L (ref 94–109)
CO2 SERPL-SCNC: 31 MMOL/L (ref 20–32)
CREAT SERPL-MCNC: 1.27 MG/DL (ref 0.66–1.25)
DIFFERENTIAL METHOD BLD: ABNORMAL
EOSINOPHIL # BLD AUTO: 0.1 10E9/L (ref 0–0.7)
EOSINOPHIL NFR BLD AUTO: 2.1 %
ERYTHROCYTE [DISTWIDTH] IN BLOOD BY AUTOMATED COUNT: 14 % (ref 10–15)
GFR SERPL CREATININE-BSD FRML MDRD: 48 ML/MIN/{1.73_M2}
GLUCOSE SERPL-MCNC: 98 MG/DL (ref 70–99)
HCT VFR BLD AUTO: 40 % (ref 40–53)
HGB BLD-MCNC: 13.1 G/DL (ref 13.3–17.7)
LYMPHOCYTES # BLD AUTO: 1 10E9/L (ref 0.8–5.3)
LYMPHOCYTES NFR BLD AUTO: 26.8 %
MCH RBC QN AUTO: 32.3 PG (ref 26.5–33)
MCHC RBC AUTO-ENTMCNC: 32.8 G/DL (ref 31.5–36.5)
MCV RBC AUTO: 99 FL (ref 78–100)
MONOCYTES # BLD AUTO: 0.7 10E9/L (ref 0–1.3)
MONOCYTES NFR BLD AUTO: 17.4 %
NEUTROPHILS # BLD AUTO: 2 10E9/L (ref 1.6–8.3)
NEUTROPHILS NFR BLD AUTO: 52.9 %
PLATELET # BLD AUTO: 185 10E9/L (ref 150–450)
POTASSIUM SERPL-SCNC: 5.2 MMOL/L (ref 3.4–5.3)
RBC # BLD AUTO: 4.06 10E12/L (ref 4.4–5.9)
SODIUM SERPL-SCNC: 137 MMOL/L (ref 133–144)
T4 FREE SERPL-MCNC: 1.12 NG/DL (ref 0.76–1.46)
TSH SERPL DL<=0.005 MIU/L-ACNC: 9.54 MU/L (ref 0.4–4)
WBC # BLD AUTO: 3.7 10E9/L (ref 4–11)

## 2021-04-19 PROCEDURE — 36415 COLL VENOUS BLD VENIPUNCTURE: CPT | Performed by: NURSE PRACTITIONER

## 2021-04-19 PROCEDURE — 84439 ASSAY OF FREE THYROXINE: CPT | Performed by: NURSE PRACTITIONER

## 2021-04-19 PROCEDURE — 83883 ASSAY NEPHELOMETRY NOT SPEC: CPT | Performed by: NURSE PRACTITIONER

## 2021-04-19 PROCEDURE — 85025 COMPLETE CBC W/AUTO DIFF WBC: CPT | Performed by: NURSE PRACTITIONER

## 2021-04-19 PROCEDURE — 83883 ASSAY NEPHELOMETRY NOT SPEC: CPT | Mod: 59 | Performed by: NURSE PRACTITIONER

## 2021-04-19 PROCEDURE — 99214 OFFICE O/P EST MOD 30 MIN: CPT | Performed by: NURSE PRACTITIONER

## 2021-04-19 PROCEDURE — 84443 ASSAY THYROID STIM HORMONE: CPT | Performed by: NURSE PRACTITIONER

## 2021-04-19 PROCEDURE — 80048 BASIC METABOLIC PNL TOTAL CA: CPT | Performed by: NURSE PRACTITIONER

## 2021-04-19 RX ORDER — METOPROLOL TARTRATE 25 MG/1
25 TABLET, FILM COATED ORAL 2 TIMES DAILY
Qty: 180 TABLET | Refills: 1 | Status: SHIPPED | OUTPATIENT
Start: 2021-04-19 | End: 2021-09-28

## 2021-04-19 RX ORDER — LISINOPRIL 40 MG/1
40 TABLET ORAL DAILY
Qty: 90 TABLET | Refills: 1 | Status: SHIPPED | OUTPATIENT
Start: 2021-04-19 | End: 2021-09-28

## 2021-04-19 RX ORDER — LEVOTHYROXINE SODIUM 112 UG/1
TABLET ORAL
Qty: 90 TABLET | Refills: 3 | Status: SHIPPED | OUTPATIENT
Start: 2021-04-19 | End: 2021-04-20

## 2021-04-19 RX ORDER — AMLODIPINE BESYLATE 5 MG/1
TABLET ORAL
Qty: 90 TABLET | Refills: 1 | Status: SHIPPED | OUTPATIENT
Start: 2021-04-19 | End: 2021-09-28

## 2021-04-19 ASSESSMENT — MIFFLIN-ST. JEOR: SCORE: 1319.85

## 2021-04-19 NOTE — PROGRESS NOTES
Assessment & Plan     Light chain disease (H)  Recheck today.  - CBC with platelets and differential  - Kappa and lambda light chain    Mild protein-calorie malnutrition (H)  STable.    Other heart failure (H)  Stable.    Prostate cancer (H)  Stable.    Persistent atrial fibrillation (H)  Stable.  Continue current treatment plan and medications.    - apixaban ANTICOAGULANT (ELIQUIS ANTICOAGULANT) 5 MG tablet; Take 1 tablet (5 mg) by mouth 2 times daily    Stage 3a chronic kidney disease  Recheck today.  - Basic metabolic panel  (Ca, Cl, CO2, Creat, Gluc, K, Na, BUN)    Essential hypertension with goal blood pressure less than 140/90  Stable.  Continue current treatment plan and medications.   - lisinopril (ZESTRIL) 40 MG tablet; Take 1 tablet (40 mg) by mouth daily  - metoprolol tartrate (LOPRESSOR) 25 MG tablet; Take 1 tablet (25 mg) by mouth 2 times daily  - amLODIPine (NORVASC) 5 MG tablet; TAKE 1 TABLET EVERY DAY    Hypothyroidism, unspecified type  Stable.  Continue current treatment plan and medications.   - TSH with free T4 reflex  - levothyroxine (SYNTHROID/LEVOTHROID) 112 MCG tablet; TAKE 1 TABLET EVERY DAY    Counseling regarding end of life decision making  POLST form completed.      Ordering of each unique test  Prescription drug management             Return in about 6 months (around 10/19/2021) for Medication Recheck.    CHELI Hardwick CNP  [facility] LEXA Lugo is a 89 year old who presents for the following health issues     HPI     Chief Complaint   Patient presents with     Forms     POLST     pharmacy change     [facility]       Hypertension Follow-up      Do you check your blood pressure regularly outside of the clinic? No     Are you following a low salt diet? Yes    Are your blood pressures ever more than 140 on the top number (systolic) OR more   than 90 on the bottom number  "(diastolic), for example 140/90? No    Heart Failure Follow-up    Are you experiencing any shortness of breath? No    Are you experiencing any swelling in your legs or feet?  No    Are you using more pillows than usual? No    Do you cough at night?  No    Do you check your weight daily?  No    Have you had a weight change recently?  No    Are you having any of the following side effects from your medications? (Select all that apply)  The patient does not report symptoms of dizziness, fatigue, cough, swelling, or slow heart beat.    Since your last visit, how many times have you gone to the cardiologist, urgent care, emergency room, or hospital because of your heart failure?   1 time    Hypothyroidism Follow-up      Since last visit, patient describes the following symptoms: Weight stable, no hair loss, no skin changes, no constipation, no loose stools        Review of Systems   Constitutional, HEENT, cardiovascular, pulmonary, gi and gu systems are negative, except as otherwise noted.      Objective    /71   Pulse 65   Temp 98.1  F (36.7  C) (Oral)   Resp 15   Ht 1.753 m (5' 9\")   Wt 68.9 kg (152 lb)   SpO2 96%   BMI 22.45 kg/m    Body mass index is 22.45 kg/m .  Physical Exam   GENERAL: healthy, alert and no distress  RESP: lungs clear to auscultation - no rales, rhonchi or wheezes  CV: irregularly irregular rhythm, normal S1 S2, no S3 or S4, no murmur, click or rub, peripheral pulses strong and no peripheral edema  MS: no gross musculoskeletal defects noted, no edema                "

## 2021-04-20 LAB
KAPPA LC UR-MCNC: 4.77 MG/DL (ref 0.33–1.94)
KAPPA LC/LAMBDA SER: 2.24 {RATIO} (ref 0.26–1.65)
LAMBDA LC SERPL-MCNC: 2.13 MG/DL (ref 0.57–2.63)

## 2021-04-20 RX ORDER — LEVOTHYROXINE SODIUM 125 UG/1
TABLET ORAL
Qty: 90 TABLET | Refills: 3 | Status: SHIPPED | OUTPATIENT
Start: 2021-04-20 | End: 2021-07-05

## 2021-05-11 ENCOUNTER — DOCUMENTATION ONLY (OUTPATIENT)
Dept: OTHER | Facility: CLINIC | Age: 86
End: 2021-05-11

## 2021-05-14 ENCOUNTER — TELEPHONE (OUTPATIENT)
Dept: FAMILY MEDICINE | Facility: CLINIC | Age: 86
End: 2021-05-14
Payer: COMMERCIAL

## 2021-05-14 DIAGNOSIS — Z53.9 ERRONEOUS ENCOUNTER--DISREGARD: Primary | ICD-10-CM

## 2021-05-29 ENCOUNTER — HEALTH MAINTENANCE LETTER (OUTPATIENT)
Age: 86
End: 2021-05-29

## 2021-07-02 DIAGNOSIS — E03.9 HYPOTHYROIDISM, UNSPECIFIED TYPE: ICD-10-CM

## 2021-07-02 LAB
T4 FREE SERPL-MCNC: 0.87 NG/DL (ref 0.76–1.46)
TSH SERPL DL<=0.005 MIU/L-ACNC: 28.66 MU/L (ref 0.4–4)

## 2021-07-02 PROCEDURE — 36415 COLL VENOUS BLD VENIPUNCTURE: CPT | Performed by: NURSE PRACTITIONER

## 2021-07-02 PROCEDURE — 84439 ASSAY OF FREE THYROXINE: CPT | Performed by: NURSE PRACTITIONER

## 2021-07-02 PROCEDURE — 84443 ASSAY THYROID STIM HORMONE: CPT | Performed by: NURSE PRACTITIONER

## 2021-07-05 ENCOUNTER — TELEPHONE (OUTPATIENT)
Dept: FAMILY MEDICINE | Facility: CLINIC | Age: 86
End: 2021-07-05

## 2021-07-05 DIAGNOSIS — E03.9 HYPOTHYROIDISM, UNSPECIFIED TYPE: ICD-10-CM

## 2021-07-05 RX ORDER — LEVOTHYROXINE SODIUM 125 UG/1
TABLET ORAL
Qty: 90 TABLET | Refills: 3 | Status: SHIPPED | OUTPATIENT
Start: 2021-07-05 | End: 2022-03-28

## 2021-07-05 NOTE — TELEPHONE ENCOUNTER
Routing to PCP with update.    Writer called with result note from [facility] about thyroid. Pt reported taking 112 mcg of levothyroxine, not 125 mcg of levothyroxine. Appears dose was increased on 4/20/21 post OV with [facility] 4/19/21.    Writer sent refill for 125 mcg to preferred pharmacy ([facility]).      Pam SINGH RN, BSN  [facility]

## 2021-08-13 ENCOUNTER — LAB (OUTPATIENT)
Dept: LAB | Facility: CLINIC | Age: 86
End: 2021-08-13
Payer: COMMERCIAL

## 2021-08-13 DIAGNOSIS — I48.19 PERSISTENT ATRIAL FIBRILLATION (H): ICD-10-CM

## 2021-08-13 DIAGNOSIS — I48.0 PAROXYSMAL ATRIAL FIBRILLATION (H): ICD-10-CM

## 2021-08-13 LAB
ANION GAP SERPL CALCULATED.3IONS-SCNC: 4 MMOL/L (ref 3–14)
BUN SERPL-MCNC: 24 MG/DL (ref 7–30)
CALCIUM SERPL-MCNC: 9.1 MG/DL (ref 8.5–10.1)
CHLORIDE BLD-SCNC: 104 MMOL/L (ref 94–109)
CO2 SERPL-SCNC: 29 MMOL/L (ref 20–32)
CREAT SERPL-MCNC: 1.16 MG/DL (ref 0.66–1.25)
GFR SERPL CREATININE-BSD FRML MDRD: 54 ML/MIN/1.73M2
GLUCOSE BLD-MCNC: 97 MG/DL (ref 70–99)
POTASSIUM BLD-SCNC: 4.5 MMOL/L (ref 3.4–5.3)
SODIUM SERPL-SCNC: 137 MMOL/L (ref 133–144)

## 2021-08-13 PROCEDURE — 36415 COLL VENOUS BLD VENIPUNCTURE: CPT

## 2021-08-13 PROCEDURE — 80048 BASIC METABOLIC PNL TOTAL CA: CPT

## 2021-09-13 ENCOUNTER — TELEPHONE (OUTPATIENT)
Dept: CARDIOLOGY | Facility: CLINIC | Age: 86
End: 2021-09-13

## 2021-09-13 ENCOUNTER — VIRTUAL VISIT (OUTPATIENT)
Dept: CARDIOLOGY | Facility: CLINIC | Age: 86
End: 2021-09-13
Attending: INTERNAL MEDICINE
Payer: COMMERCIAL

## 2021-09-13 DIAGNOSIS — Z53.9 NO SHOW: Primary | ICD-10-CM

## 2021-09-13 NOTE — TELEPHONE ENCOUNTER
I called this patient today at 5:00 PM. He was not very enthusiastic to talk. He did not want to continue with the telephone visit. He said he is fine and he wants to avoid further visits for a while.

## 2021-09-13 NOTE — LETTER
9/13/2021      RE: Parish Driscoll  [address] [address]       Dear Colleague,    Thank you for the opportunity to participate in the care of your patient, Parish Driscoll, at the [facility]ART CLINIC [facility]. Please see a copy of my visit note below.      This patient was a no show for this scheduled appointment.      Please do not hesitate to contact me if you have any questions/concerns.     Sincerely,     Pio Timmons MD

## 2021-09-18 ENCOUNTER — HEALTH MAINTENANCE LETTER (OUTPATIENT)
Age: 86
End: 2021-09-18

## 2021-09-28 DIAGNOSIS — I10 ESSENTIAL HYPERTENSION WITH GOAL BLOOD PRESSURE LESS THAN 140/90: ICD-10-CM

## 2021-09-28 RX ORDER — LISINOPRIL 40 MG/1
TABLET ORAL
Qty: 90 TABLET | Refills: 3 | Status: SHIPPED | OUTPATIENT
Start: 2021-09-28 | End: 2022-05-26

## 2021-09-28 RX ORDER — METOPROLOL TARTRATE 25 MG/1
TABLET, FILM COATED ORAL
Qty: 180 TABLET | Refills: 3 | Status: SHIPPED | OUTPATIENT
Start: 2021-09-28 | End: 2022-05-26

## 2021-09-28 RX ORDER — AMLODIPINE BESYLATE 5 MG/1
TABLET ORAL
Qty: 90 TABLET | Refills: 3 | Status: SHIPPED | OUTPATIENT
Start: 2021-09-28 | End: 2022-05-26

## 2021-10-04 ENCOUNTER — OFFICE VISIT (OUTPATIENT)
Dept: FAMILY MEDICINE | Facility: CLINIC | Age: 86
End: 2021-10-04
Payer: COMMERCIAL

## 2021-10-04 VITALS
WEIGHT: 147 LBS | BODY MASS INDEX: 21.77 KG/M2 | DIASTOLIC BLOOD PRESSURE: 70 MMHG | HEART RATE: 57 BPM | HEIGHT: 69 IN | TEMPERATURE: 98.4 F | SYSTOLIC BLOOD PRESSURE: 130 MMHG | OXYGEN SATURATION: 97 %

## 2021-10-04 DIAGNOSIS — I10 ESSENTIAL HYPERTENSION WITH GOAL BLOOD PRESSURE LESS THAN 140/90: ICD-10-CM

## 2021-10-04 DIAGNOSIS — Z23 NEED FOR PROPHYLACTIC VACCINATION AND INOCULATION AGAINST INFLUENZA: ICD-10-CM

## 2021-10-04 DIAGNOSIS — Z00.00 ENCOUNTER FOR MEDICARE ANNUAL WELLNESS EXAM: Primary | ICD-10-CM

## 2021-10-04 DIAGNOSIS — I50.89 OTHER HEART FAILURE (H): ICD-10-CM

## 2021-10-04 DIAGNOSIS — E44.1 MILD PROTEIN-CALORIE MALNUTRITION (H): ICD-10-CM

## 2021-10-04 DIAGNOSIS — I48.20 ATRIAL FIBRILLATION, CHRONIC (H): ICD-10-CM

## 2021-10-04 DIAGNOSIS — D80.8 LIGHT CHAIN DISEASE (H): ICD-10-CM

## 2021-10-04 DIAGNOSIS — E03.9 HYPOTHYROIDISM, UNSPECIFIED TYPE: ICD-10-CM

## 2021-10-04 PROBLEM — R60.0 PERIPHERAL EDEMA: Status: RESOLVED | Noted: 2019-09-09 | Resolved: 2021-10-04

## 2021-10-04 PROBLEM — C61 PROSTATE CANCER (H): Status: RESOLVED | Noted: 2020-03-09 | Resolved: 2021-10-04

## 2021-10-04 LAB
ANION GAP SERPL CALCULATED.3IONS-SCNC: 3 MMOL/L (ref 3–14)
BASOPHILS # BLD AUTO: 0 10E3/UL (ref 0–0.2)
BASOPHILS NFR BLD AUTO: 1 %
BUN SERPL-MCNC: 28 MG/DL (ref 7–30)
CALCIUM SERPL-MCNC: 9.2 MG/DL (ref 8.5–10.1)
CHLORIDE BLD-SCNC: 102 MMOL/L (ref 94–109)
CO2 SERPL-SCNC: 31 MMOL/L (ref 20–32)
CREAT SERPL-MCNC: 1.24 MG/DL (ref 0.66–1.25)
EOSINOPHIL # BLD AUTO: 0.1 10E3/UL (ref 0–0.7)
EOSINOPHIL NFR BLD AUTO: 3 %
ERYTHROCYTE [DISTWIDTH] IN BLOOD BY AUTOMATED COUNT: 13.5 % (ref 10–15)
GFR SERPL CREATININE-BSD FRML MDRD: 49 ML/MIN/1.73M2
GLUCOSE BLD-MCNC: 109 MG/DL (ref 70–99)
HCT VFR BLD AUTO: 36.1 % (ref 40–53)
HGB BLD-MCNC: 12.1 G/DL (ref 13.3–17.7)
LYMPHOCYTES # BLD AUTO: 0.9 10E3/UL (ref 0.8–5.3)
LYMPHOCYTES NFR BLD AUTO: 23 %
MCH RBC QN AUTO: 33.4 PG (ref 26.5–33)
MCHC RBC AUTO-ENTMCNC: 33.5 G/DL (ref 31.5–36.5)
MCV RBC AUTO: 100 FL (ref 78–100)
MONOCYTES # BLD AUTO: 0.6 10E3/UL (ref 0–1.3)
MONOCYTES NFR BLD AUTO: 14 %
NEUTROPHILS # BLD AUTO: 2.3 10E3/UL (ref 1.6–8.3)
NEUTROPHILS NFR BLD AUTO: 59 %
PLATELET # BLD AUTO: 176 10E3/UL (ref 150–450)
POTASSIUM BLD-SCNC: 4.7 MMOL/L (ref 3.4–5.3)
RBC # BLD AUTO: 3.62 10E6/UL (ref 4.4–5.9)
SODIUM SERPL-SCNC: 136 MMOL/L (ref 133–144)
T4 FREE SERPL-MCNC: 0.93 NG/DL (ref 0.76–1.46)
TSH SERPL DL<=0.005 MIU/L-ACNC: 5.71 MU/L (ref 0.4–4)
WBC # BLD AUTO: 3.9 10E3/UL (ref 4–11)

## 2021-10-04 PROCEDURE — 80048 BASIC METABOLIC PNL TOTAL CA: CPT | Performed by: NURSE PRACTITIONER

## 2021-10-04 PROCEDURE — 83883 ASSAY NEPHELOMETRY NOT SPEC: CPT | Performed by: NURSE PRACTITIONER

## 2021-10-04 PROCEDURE — 36415 COLL VENOUS BLD VENIPUNCTURE: CPT | Performed by: NURSE PRACTITIONER

## 2021-10-04 PROCEDURE — 90662 IIV NO PRSV INCREASED AG IM: CPT | Performed by: NURSE PRACTITIONER

## 2021-10-04 PROCEDURE — 99397 PER PM REEVAL EST PAT 65+ YR: CPT | Mod: 25 | Performed by: NURSE PRACTITIONER

## 2021-10-04 PROCEDURE — 84443 ASSAY THYROID STIM HORMONE: CPT | Performed by: NURSE PRACTITIONER

## 2021-10-04 PROCEDURE — 84439 ASSAY OF FREE THYROXINE: CPT | Performed by: NURSE PRACTITIONER

## 2021-10-04 PROCEDURE — G0008 ADMIN INFLUENZA VIRUS VAC: HCPCS | Performed by: NURSE PRACTITIONER

## 2021-10-04 PROCEDURE — 85025 COMPLETE CBC W/AUTO DIFF WBC: CPT | Performed by: NURSE PRACTITIONER

## 2021-10-04 RX ORDER — ASPIRIN 81 MG/1
81 TABLET ORAL DAILY
COMMUNITY
End: 2022-08-22

## 2021-10-04 ASSESSMENT — ACTIVITIES OF DAILY LIVING (ADL)
CURRENT_FUNCTION: HOUSEWORK REQUIRES ASSISTANCE
CURRENT_FUNCTION: TRANSPORTATION REQUIRES ASSISTANCE

## 2021-10-04 ASSESSMENT — MIFFLIN-ST. JEOR: SCORE: 1297.17

## 2021-10-04 NOTE — PROGRESS NOTES
The patient was counseled and encouraged to consider modifying their diet and eating habits. He was provided with information on recommended healthy diet options.  The patient reports that he has difficulty with activities of daily living. I have asked that the patient make a follow up appointment in 26 weeks where this issue will be further evaluated and addressed.  The patient was provided with written information regarding signs of hearing loss.  Information on urinary incontinence and treatment options given to patient.  He is at risk for falling and has been provided with information to reduce the risk of falling at home.

## 2021-10-04 NOTE — PROGRESS NOTES
"SUBJECTIVE:   Parish Driscoll is a 89 year old male who presents for Preventive Visit.      Patient has been advised of split billing requirements and indicates understanding: Yes   Are you in the first 12 months of your Medicare coverage?  No    Healthy Habits:    In general, how would you rate your overall health?  Very good    Frequency of exercise:  6-7 days/week    Duration of exercise:  15-30 minutes    Do you usually eat at least 4 servings of fruit and vegetables a day, include whole grains    & fiber and avoid regularly eating high fat or \"junk\" foods?  No    Taking medications regularly:  Yes    Barriers to taking medications:  None    Medication side effects:  None    Ability to successfully perform activities of daily living:  Housework requires assistance and transportation requires assistance    Hearing Impairment:  Find that men's voices are easier to understand than woman's    In the past 6 months, have you been bothered by leaking of urine? Yes    In general, how would you rate your overall mental or emotional health?  Good      PHQ-2 Total Score:    Additional concerns today:  No    Do you feel safe in your environment? Yes    Have you ever done Advance Care Planning? (For example, a Health Directive, POLST, or a discussion with a medical provider or your loved ones about your wishes): Yes, advance care planning is on file.       Fall risk  Fallen 2 or more times in the past year?: Yes  Any fall with injury in the past year?: No    Cognitive Screening   1) Repeat 3 items (Leader, Season, Table)    2) Clock draw: NORMAL  3) 3 item recall: Recalls 1 object   Results: NORMAL clock, 1-2 items recalled: COGNITIVE IMPAIRMENT LESS LIKELY    Mini-CogTM Copyright KAIDEN Cruz. Licensed by the author for use in [facility]; reprinted with permission (willard@.[email or web address]). All rights reserved.      Do you have sleep apnea, excessive snoring or daytime drowsiness?: yes    Reviewed and updated as needed this " visit by clinical staff  Tobacco  Allergies  Meds  Problems  Med Hx  Surg Hx  Fam Hx          Reviewed and updated as needed this visit by Provider  Tobacco  Allergies  Meds  Problems  Med Hx  Surg Hx  Fam Hx         Social History     Tobacco Use     Smoking status: Former Smoker     Years: 15.00     Types: Cigarettes     Quit date: 1960     Years since quittin.8     Smokeless tobacco: Never Used   Substance Use Topics     Alcohol use: No         No flowsheet data found.      Hypertension Follow-up      Do you check your blood pressure regularly outside of the clinic? No     Are you following a low salt diet? Yes    Are your blood pressures ever more than 140 on the top number (systolic) OR more   than 90 on the bottom number (diastolic), for example 140/90? No    Heart Failure Follow-up  Patient denies cough, shortness of breath, lower extremity edema      Hypothyroidism Follow-up      Since last visit, patient describes the following symptoms: Weight stable, no hair loss, no skin changes, no constipation, no loose stools      Current providers sharing in care for this patient include:   Patient Care Team:  Inés Aragon APRN CNP as PCP - General (Internal Medicine)  Josee Arora MD as Assigned Heart and Vascular Provider  Inés Aragon APRN CNP as Assigned PCP  Pio Timmons MD as MD (Cardiovascular Disease)    The following health maintenance items are reviewed in Epic and correct as of today:  Health Maintenance Due   Topic Date Due     ANNUAL REVIEW OF  ORDERS  Never done     DTAP/TDAP/TD IMMUNIZATION (3 - Td or Tdap) 2021     Lab work is in process  BP Readings from Last 3 Encounters:   10/04/21 130/70   21 136/71   10/05/20 124/62    Wt Readings from Last 3 Encounters:   10/04/21 66.7 kg (147 lb)   21 68.9 kg (152 lb)   10/05/20 66.9 kg (147 lb 8 oz)                  Patient Active Problem List   Diagnosis     Hypothyroidism     Essential  hypertension with goal blood pressure less than 140/90     Hyperlipidemia with target LDL less than 130     History of prostate cancer     Hyponatremia     Edema     Right thigh pain     Closed compression fracture of L4 lumbar vertebra with delayed healing, subsequent encounter     Chronic idiopathic constipation     Urinary urgency     Light chain disease (H)     CKD (chronic kidney disease) stage 3, GFR 30-59 ml/min     Protein-calorie malnutrition (H)     Vitamin D deficiency     Atrial fibrillation, chronic (H)     Other heart failure (H)     Past Surgical History:   Procedure Laterality Date     CYSTECTOMY BRANCHIAL CLEFT  2015     CYSTOSCOPY  2003     TONSILLECTOMY      at age 12       Social History     Tobacco Use     Smoking status: Former Smoker     Years: 15.00     Types: Cigarettes     Quit date: 1960     Years since quittin.8     Smokeless tobacco: Never Used   Substance Use Topics     Alcohol use: No     Family History   Problem Relation Age of Onset     Hypertension Mother      Hypertension Father      Depression Father      Asthma Sister      Depression Sister      Diabetes Maternal Grandmother      Cancer Maternal Grandfather         Stomach     Heart Disease No family hx of          Current Outpatient Medications   Medication Sig Dispense Refill     amLODIPine (NORVASC) 5 MG tablet TAKE 1 TABLET DAILY 90 tablet 3     apixaban ANTICOAGULANT (ELIQUIS ANTICOAGULANT) 5 MG tablet Take 1 tablet (5 mg) by mouth 2 times daily 180 tablet 3     aspirin 81 MG EC tablet Take 81 mg by mouth daily       augmented betamethasone dipropionate (DIPROLENE-AF) 0.05 % external cream APPLY SPARINGLY TO AFFECTED AREA TWICE DAILY AS NEEDED.  DO NOT APPLY TO FACE.  Use for 14 days. 15 g 1     levothyroxine (SYNTHROID/LEVOTHROID) 125 MCG tablet TAKE 1 TABLET EVERY DAY 90 tablet 3     lisinopril (ZESTRIL) 40 MG tablet TAKE 1 TABLET DAILY 90 tablet 3     metoprolol tartrate (LOPRESSOR) 25 MG tablet TAKE 1  "TABLET TWICE A  tablet 3     vitamin D3 (CHOLECALCIFEROL) 2000 units (50 mcg) tablet Take 1 tablet (2,000 Units) by mouth daily       Allergies   Allergen Reactions     Mold      Nkda [No Known Drug Allergies]      Seasonal Allergies Other (See Comments)     Runny nose             Review of Systems  Constitutional, HEENT, cardiovascular, pulmonary, gi and gu systems are negative, except as otherwise noted.    OBJECTIVE:   /70   Pulse 57   Temp 98.4  F (36.9  C) (Oral)   Ht 1.753 m (5' 9\")   Wt 66.7 kg (147 lb)   SpO2 97%   BMI 21.71 kg/m   Estimated body mass index is 21.71 kg/m  as calculated from the following:    Height as of this encounter: 1.753 m (5' 9\").    Weight as of this encounter: 66.7 kg (147 lb).  Physical Exam  GENERAL: healthy, alert and no distress  EYES: Eyes grossly normal to inspection, PERRL and conjunctivae and sclerae normal  HENT: ear canals and TM's normal, nose and mouth without ulcers or lesions  NECK: no adenopathy, no asymmetry, masses, or scars and thyroid normal to palpation  RESP: lungs clear to auscultation - no rales, rhonchi or wheezes  CV: regular rate and rhythm, normal S1 S2, no S3 or S4, no murmur, click or rub, no peripheral edema and peripheral pulses strong  MS: no gross musculoskeletal defects noted, no edema  NEURO: Normal strength and tone, mentation intact and speech normal  PSYCH: mentation appears normal, affect normal/bright        ASSESSMENT / PLAN:   (Z00.00) Encounter for Medicare annual wellness exam  (primary encounter diagnosis)  Comment:   Plan:     (E03.9) Hypothyroidism, unspecified type  Comment: Recheck labs, last TSH elevated.  Plan: TSH with free T4 reflex            (I10) Essential hypertension with goal blood pressure less than 140/90  Comment: Stable.  Continue current treatment plan and medications.   Plan: Basic metabolic panel  (Ca, Cl, CO2, Creat,         Gluc, K, Na, BUN)            (D89.89) Light chain disease (H)  Comment: " "  Plan: CBC with platelets and differential, Kappa and         lambda light chain            (E44.1) Mild protein-calorie malnutrition (H)  Comment:   Plan: Stable.  Continuing to monitor.    (I50.89) Other heart failure (H)  Comment:   Plan: Stable.  Continue current treatment plan and medications.     (I48.20) Atrial fibrillation, chronic (H)  Comment:   Plan: Stable.  Continue current treatment plan and medications.     (Z23) Need for prophylactic vaccination and inoculation against influenza  Comment:   Plan: INFLUENZA, QUAD, HIGH DOSE, PF, 65YR + (FLUZONE        HD)              Patient has been advised of split billing requirements and indicates understanding: Yes  COUNSELING:  Reviewed preventive health counseling, as reflected in patient instructions       Regular exercise       Healthy diet/nutrition       Immunizations    Vaccinated for: Influenza          Estimated body mass index is 21.71 kg/m  as calculated from the following:    Height as of this encounter: 1.753 m (5' 9\").    Weight as of this encounter: 66.7 kg (147 lb).    Weight management plan noted, stable and monitoring    He reports that he quit smoking about 61 years ago. His smoking use included cigarettes. He quit after 15.00 years of use. He has never used smokeless tobacco.      Appropriate preventive services were discussed with this patient, including applicable screening as appropriate for cardiovascular disease, diabetes, osteopenia/osteoporosis, and glaucoma.  As appropriate for age/gender, discussed screening for colorectal cancer, prostate cancer, breast cancer, and cervical cancer. Checklist reviewing preventive services available has been given to the patient.    Reviewed patients plan of care and provided an AVS. The Basic Care Plan (routine screening as documented in Health Maintenance) for Parish meets the Care Plan requirement. This Care Plan has been established and reviewed with the Patient.    Counseling Resources:  ATP IV " Guidelines  Pooled Cohorts Equation Calculator  Breast Cancer Risk Calculator  Breast Cancer: Medication to Reduce Risk  FRAX Risk Assessment  ICSI Preventive Guidelines  Dietary Guidelines for Americans, 2010  USDA's MyPlate  ASA Prophylaxis  Lung CA Screening    CHELI Hardwick CNP  [facility]    Identified Health Risks:

## 2021-10-04 NOTE — PATIENT INSTRUCTIONS
Patient Education   Personalized Prevention Plan  You are due for the preventive services outlined below.  Your care team is available to assist you in scheduling these services.  If you have already completed any of these items, please share that information with your care team to update in your medical record.  Health Maintenance Due   Topic Date Due     ANNUAL REVIEW OF HM ORDERS  Never done     Diptheria Tetanus Pertussis (DTAP/TDAP/TD) Vaccine (3 - Td or Tdap) 06/27/2021     Flu Vaccine (1) 09/01/2021       Understanding USDA MyPlate  The USDA has guidelines to help you make healthy food choices. These are called MyPlate. MyPlate shows the food groups that make up healthy meals using the image of a place setting. Before you eat, think about the healthiest choices for what to put on your plate or in your cup or bowl. To learn more about building a healthy plate, visit www.virtual tweens ltdmyplate.gov.    The food groups    Fruits. Any fruit or 100% fruit juice counts as part of the Fruit Group. Fruits may be fresh, canned, frozen, or dried, and may be whole, cut-up, or pureed. Make 1/2 of your plate fruits and vegetables.    Vegetables. Any vegetable or 100% vegetable juice counts as a member of the Vegetable Group. Vegetables may be fresh, frozen, canned, or dried. They can be served raw or cooked and may be whole, cut-up, or mashed. Make 1/2 of your plate fruits and vegetables.    Grains. All foods made from grains are part of the Grains Group. These include wheat, rice, oats, cornmeal, and barley. Grains are often used to make foods such as bread, pasta, oatmeal, cereal, tortillas, and grits. Grains should be no more than 1/4 of your plate. At least half of your grains should be whole grains.    Protein. This group includes meat, poultry, seafood, beans and peas, eggs, processed soy products (such as tofu), nuts (including nut butters), and seeds. Make protein choices no more than 1/4 of your plate. Meat and poultry  choices should be lean or low fat.    Dairy. The Dairy Group includes all fluid milk products and foods made from milk that contain calcium, such as yogurt and cheese. (Foods that have little calcium, such as cream, butter, and cream cheese, are not part of this group.) Most dairy choices should be low-fat or fat-free.    Oils. Oils aren't a food group, but they do contain essential nutrients. However it's important to watch your intake of oils. These are fats that are liquid at room temperature. They include canola, corn, olive, soybean, vegetable, and sunflower oil. Foods that are mainly oil include mayonnaise, certain salad dressings, and soft margarines. You likely already get your daily oil allowance from the foods you eat.  Things to limit  Eating healthy also means limiting these things in your diet:       Salt (sodium). Many processed foods have a lot of sodium. To keep sodium intake down, eat fresh vegetables, meats, poultry, and seafood when possible. Purchase low-sodium, reduced-sodium, or no-salt-added food products at the store. And don't add salt to your meals at home. Instead, season them with herbs and spices such as dill, oregano, cumin, and paprika. Or try adding flavor with lemon or lime zest and juice.    Saturated fat. Saturated fats are most often found in animal products such as beef, pork, and chicken. They are often solid at room temperature, such as butter. To reduce your saturated fat intake, choose leaner cuts of meat and poultry. And try healthier cooking methods such as grilling, broiling, roasting, or baking. For a simple lower-fat swap, use plain nonfat yogurt instead of mayonnaise when making potato salad or macaroni salad.    Added sugars. These are sugars added to foods. They are in foods such as ice cream, candy, soda, fruit drinks, sports drinks, energy drinks, cookies, pastries, jams, and syrups. Cut down on added sugars by sharing sweet treats with a family member or friend.  You can also choose fruit for dessert, and drink water or other unsweetened beverages.     [employer] last reviewed this educational content on 6/1/2020 2000-2021 [employer]. All rights reserved. This information is not intended as a substitute for professional medical care. Always follow your healthcare professional's instructions.        Activities of Daily Living    Your Health Risk Assessment indicates you have difficulties with activities of daily living such as housework, bathing, preparing meals, taking medication, etc. Please make a follow up appointment for us to address this issue in more detail.    Signs of Hearing Loss      Hearing much better with one ear can be a sign of hearing loss.   Hearing loss is a problem shared by many people. In fact, it is one of the most common health problems, particularly as people age. Most people age 65 and older have some hearing loss. By age 80, almost everyone does. Hearing loss often occurs slowly over the years. So you may not realize your hearing has gotten worse.  Have your hearing checked  Call your healthcare provider if you:    Have to strain to hear normal conversation    Have to watch other people s faces very carefully to follow what they re saying    Need to ask people to repeat what they ve said    Often misunderstand what people are saying    Turn the volume of the television or radio up so high that others complain    Feel that people are mumbling when they re talking to you    Find that the effort to hear leaves you feeling tired and irritated    Notice, when using the phone, that you hear better with one ear than the other  [employer] last reviewed this educational content on 1/1/2020 2000-2021 [employer]. All rights reserved. This information is not intended as a substitute for professional medical care. Always follow your healthcare professional's instructions.          Urinary Incontinence (Male)    Urinary  incontinence means not being able to control the release of urine from the bladder.   Causes  Common causes of urinary incontinence in men include:    Infection    Certain medicines    Aging    Poor pelvic muscle tone    Bladder spasms    Obesity    Trouble urinating and fully emptying the bladder (urinary retention)  Other things that can cause incontinence are:     Nervous system diseases    Diabetes    Sleep apnea    Urinary tract infections    Prostate surgery    Pelvic injury  Constipation and smoking have also been identified as risk factors.   Symptoms    Urge incontinence (overactive bladder). This is a sudden urge to urinate. It occurs even though there may not be much urine in the bladder. The need to urinate often during the night is common. It's due to bladder spasms.    Stress incontinence. This is urine leakage that you can't control. It can occur with sneezing, coughing, and other actions that put stress on the bladder.    Treatment  Treatment depends on what is causing the condition. Bladder infections are treated with antibiotics. Urinary retention is treated with a bladder catheter.   Home care  Follow these guidelines when caring for yourself at home:    Don't have any foods and drinks that may irritate the bladder. This includes:  ? Chocolate  ? Alcohol  ? Caffeine  ? Carbonated drinks  ? Acidic fruits and juices    Limit fluids to 6 to 8 cups a day.    Lose weight if you are overweight. This will reduce your symptoms.    If advised, do regular pelvic muscle-strengthening exercises such as Kegel exercises.    If needed, wear absorbent pads to catch urine. Change the pads often. This is for good hygiene and to prevent skin and bladder infections.    Bathe daily for good hygiene.    If an antibiotic was prescribed to treat a bladder infection, take it until it's finished. Keep taking it even if you are feeling better. This is to make sure your infection has cleared.    If a catheter was left in  place, keep bacteria from getting into the collection bag. Don't disconnect the catheter from the collection bag.    Use a leg band to secure the catheter drainage tube, so it does not pull on the catheter. Drain the collection bag when it becomes full. To do this, use the drain spout at the bottom of the bag. Don't disconnect the bag from the catheter.    Don't pull on or try to remove a catheter. The catheter must be removed by a healthcare provider.    If you smoke, stop. Ask your provider for help if you can't do this on your own.  Follow-up care  Follow up with your healthcare provider, or as advised.  When to get medical advice  Call your healthcare provider right away if any of these occur:    Fever over 100.4 F (38 C), or as directed by your provider    Bladder pain or fullness    Belly swelling, nausea, or vomiting    Back pain    Weakness, dizziness, or fainting    If a catheter was left in place, return if:  ? The catheter falls out  ? The catheter stops draining for 6 hours  ? Your urine gets cloudy or smells bad  [employer] last reviewed this educational content on 1/1/2020 2000-2021 [employer]. All rights reserved. This information is not intended as a substitute for professional medical care. Always follow your healthcare professional's instructions.          Preventing Falls at Home  A person can fall for many reasons. Older adults may fall because reaction time slows down as we age. Your muscles and joints may get stiff, weak, or less flexible because of illness, medicines, or a physical condition.   Other health problems that make falls more likely include:     Arthritis    Dizziness or lightheadedness when you stand up (orthostatic hypotension)    History of a stroke    Dizziness    Anemia    Certain medicines taken for mental illness or to control blood pressure.    Problems with balance or gait    Bladder or urinary problems    History of falling    Changes in vision (vision  impairment)    Changes in thinking skills and memory (cognitive impairment)  Injuries from a fall can include serious injuries such as broken bones, dislocated joints, internal bleeding and cuts. Injuries like these can limit your independence.   Prevention tips  To help prevent falls and fall-related injuries, follow the tips below.    Floors  To make floors safer:     Put nonskid pads under area rugs.    Remove small rugs.    Replace worn floor coverings.    Tack carpets firmly to each step on carpeted stairs. Put nonskid strips on the edges of uncarpeted stairs.    Keep floors and stairs free of clutter and cords.    Arrange furniture so there are clear pathways.    Clean up any spills right away.  Bathrooms    To make bathrooms safer:     Install grab bars in the tub or shower.    Apply nonskid strips or put a nonskid rubber mat in the tub or shower.    Sit on a bath chair to bathe.    Use bathmats with nonskid backing.  Lighting  To improve visibility in your home:      Keep a flashlight in each room. Or put a lamp next to the bed within easy reach.    Put nightlights in the bedrooms, hallways, kitchen, and bathrooms.    Make sure all stairways have good lighting.    Take your time when going up and down stairs.    Put handrails on both sides of stairs and in walkways for more support. To prevent injury to your wrist or arm, don t use handrails to pull yourself up.    Install grab bars to pull yourself up.    Move or rearrange items that you use often. This will make them easier to find or reach.    Look at your home to find any safety hazards. Especially look at doorways, walkways, and the driveway. Remove or repair any safety problems that you find.  Other changes to make    Look around to find any safety hazards. Look closely at doorways, walkways, and the driveway. Remove or repair any safety problems that you find.    Wear shoes that fit well.    Take your time when going up and down stairs.    Put  handrails on both sides of stairs and in walkways for more support. To prevent injury to your wrist or arm, don t use handrails to pull yourself up.    Install grab bars wherever needed to pull yourself up.    Arrange items that you use often. This will make them easier to find or reach.    Marisela last reviewed this educational content on 3/1/2020    0258-4653 [employer]. All rights reserved. This information is not intended as a substitute for professional medical care. Always follow your healthcare professional's instructions.

## 2021-10-05 LAB
KAPPA LC FREE SER-MCNC: 4.4 MG/DL (ref 0.33–1.94)
KAPPA LC FREE/LAMBDA FREE SER NEPH: 2.42 {RATIO} (ref 0.26–1.65)
LAMBDA LC FREE SERPL-MCNC: 1.82 MG/DL (ref 0.57–2.63)

## 2021-10-05 NOTE — RESULT ENCOUNTER NOTE
Dear Parish,    Your recent test results are attached.      Your labs are stable.    If you have any questions please feel free to contact [phone] or myself via [employer].    Sincerely,  Inés Aragon, CNP

## 2021-10-11 ENCOUNTER — VIRTUAL VISIT (OUTPATIENT)
Dept: CARDIOLOGY | Facility: CLINIC | Age: 86
End: 2021-10-11
Attending: INTERNAL MEDICINE
Payer: COMMERCIAL

## 2021-10-11 DIAGNOSIS — I48.21 PERMANENT ATRIAL FIBRILLATION (H): Primary | ICD-10-CM

## 2021-10-11 DIAGNOSIS — I48.19 PERSISTENT ATRIAL FIBRILLATION (H): ICD-10-CM

## 2021-10-11 DIAGNOSIS — I10 HYPERTENSION, WELL CONTROLLED: ICD-10-CM

## 2021-10-11 PROCEDURE — 99215 OFFICE O/P EST HI 40 MIN: CPT | Mod: 95 | Performed by: INTERNAL MEDICINE

## 2021-10-11 NOTE — PROGRESS NOTES
"Parish is a 89 year old who is being evaluated via a billable telephone visit.      The patient has been notified of following:      \"This telephone visit will be conducted via a call between you and your physician/provider. We have found that certain health care needs can be provided without the need for a physical exam.  This service lets us provide the care you need with a short phone conversation.  If a prescription is necessary we can send it directly to your pharmacy.  If lab work is needed we can place an order for that and you can then stop by our lab to have the test done at a later time.     Telephone visits are billed at different rates depending on your insurance coverage. During this emergency period, for some insurers they may be billed the same as an in-person visit.  Please reach out to your insurance provider with any questions.     If during the course of the call the physician/provider feels a telephone visit is not appropriate, you will not be charged for this service.\"     Patient has given verbal consent for Telephone visit?  Yes     How would you like to obtain your AVS? Offerboxxt     Phone call duration: 9 min (phone call started at 2:08 PM)    HPI: Mr. Parish Driscoll is a 89 year old  male with PMH significant for    -Hypertension  -Permanent atrial fibrillation on anticoagulation and rate control    Patient was diagnosed with permanent atrial fibrillation a year ago on twelve-lead EKG. Patient was last seen in cardiology by my colleague Dr. Arora February of this year.  He is on Eliquis 5 mg which he tells me that he is taking once daily (apparently he was told by a provider that he cannot remember), aspirin, metoprolol 25 twice daily, lisinopril 40 mg and amlodipine 5 mg.    He reports no symptoms.  He reports feeling well.    Echocardiogram 8/2020 showed normal biventricular function with no valvular disease.    Medications, personal, family, and social history reviewed with patient and " revised.    PAST MEDICAL HISTORY:  Past Medical History:   Diagnosis Date     Hematuria, microscopic      Hernia, inguinal 6-    Dr. Spann     HTN (hypertension)      Hyperlipidemia LDL goal < 130      Hypertension goal BP (blood pressure) < 140/90      Hypothyroidism     Dr. Gardner/ Dr. Longoria     Prostate cancer (H)        CURRENT MEDICATIONS:  Current Outpatient Medications   Medication Sig Dispense Refill     amLODIPine (NORVASC) 5 MG tablet TAKE 1 TABLET DAILY 90 tablet 3     apixaban ANTICOAGULANT (ELIQUIS ANTICOAGULANT) 5 MG tablet Take 1 tablet (5 mg) by mouth 2 times daily 180 tablet 3     aspirin 81 MG EC tablet Take 81 mg by mouth daily       augmented betamethasone dipropionate (DIPROLENE-AF) 0.05 % external cream APPLY SPARINGLY TO AFFECTED AREA TWICE DAILY AS NEEDED.  DO NOT APPLY TO FACE.  Use for 14 days. 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     CYSTOSCOPY  06/2003     TONSILLECTOMY      at age 12       ALLERGIES:     Allergies   Allergen Reactions     Mold      Nkda [No Known Drug Allergies]      Seasonal Allergies Other (See Comments)     Runny nose       FAMILY HISTORY:  Family History   Problem Relation Age of Onset     Hypertension Mother      Hypertension Father      Depression Father      Asthma Sister      Depression Sister      Diabetes Maternal Grandmother      Cancer Maternal Grandfather         Stomach     Heart Disease No family hx of          SOCIAL HISTORY:  Social History     Tobacco Use     Smoking status: Former Smoker     Years:  15.00     Types: Cigarettes     Quit date: 1960     Years since quittin.8     Smokeless tobacco: Never Used   Substance Use Topics     Alcohol use: No     Drug use: No        I have reviewed the labs and personally reviewed the imaging below and made my comment in the assessment and plan.    Labs:  CBC RESULTS:   Lab Results   Component Value Date    WBC 3.9 (L) 10/04/2021    WBC 3.7 (L) 2021    RBC 3.62 (L) 10/04/2021    RBC 4.06 (L) 2021    HGB 12.1 (L) 10/04/2021    HGB 13.1 (L) 2021    HCT 36.1 (L) 10/04/2021    HCT 40.0 2021     10/04/2021    MCV 99 2021    MCH 33.4 (H) 10/04/2021    MCH 32.3 2021    MCHC 33.5 10/04/2021    MCHC 32.8 2021    RDW 13.5 10/04/2021    RDW 14.0 2021     10/04/2021     2021       BMP RESULTS:  Lab Results   Component Value Date     10/04/2021     2021    POTASSIUM 4.7 10/04/2021    POTASSIUM 5.2 2021    CHLORIDE 102 10/04/2021    CHLORIDE 103 2021    CO2 31 10/04/2021    CO2 31 2021    ANIONGAP 3 10/04/2021    ANIONGAP 3 2021     (H) 10/04/2021    GLC 98 2021    BUN 28 10/04/2021    BUN 36 (H) 2021    CR 1.24 10/04/2021    CR 1.27 (H) 2021    GFRESTIMATED 49 (L) 10/04/2021    GFRESTIMATED 48 (L) 2021    GFRESTBLACK 55 (L) 2021    BRYAN 9.2 10/04/2021    BRYAN 9.7 2021        Echocardiogram 2020  Global and regional left ventricular function is normal with an EF of 60-65%.  Right ventricular function, chamber size, wall motion, and thickness are  normal.  No significant valvular abnormalities were noted.  The inferior vena cava was normal in size with preserved respiratory  variability with normal RA pressure.  Previous study not available for comparison.    EKG 2020 shows atrial flutter, LVH, 76 bpm    Assessment and Plan:   Patient is being seen today via telephone telehealth visit for follow-up.  He reports doing  well.    #Permanent atrial fibrillation  -Patient has been using Eliquis 5 mg once daily over the last few months.  Recommend to take Eliquis 5 mg twice daily.  -Stop aspirin  -Continue metoprolol 25 mg twice daily for rate control    #Hypertension  -Well-controlled with amlodipine, lisinopril and metoprolol.    Follow-up with primary care physician.    Return to clinic with me as needed.    Total time spent today for this visit is 45 minutes including precharting, telehealth telephone visit, review of labs/imaging and medical documentation.    Please donot hesitate to contact me if you have any questions or concerns. Again, thank you for allowing me to participate in the care of your patient.    Pio HUNTLEY MD  [facility] Division of Cardiology  Pager [phone]

## 2021-10-11 NOTE — PATIENT INSTRUCTIONS
Recommend to take Eliquis 5 mg twice daily as directed on prescription bottle.    Stop aspirin    Continue metoprolol 25 mg twice daily     Follow up with your primary care doctor regarding blood pressure control.    Follow up with cardiology as needed.

## 2021-10-11 NOTE — LETTER
"10/11/2021       RE: Parish Driscoll  [address] [place] [address]       Dear Colleague,    Thank you for the opportunity to participate in the care of your patient, Parish Driscoll, at the [facility]. Please see a copy of my visit note below.    Parish is a 89 year old who is being evaluated via a billable telephone visit.      The patient has been notified of following:      \"This telephone visit will be conducted via a call between you and your physician/provider. We have found that certain health care needs can be provided without the need for a physical exam.  This service lets us provide the care you need with a short phone conversation.  If a prescription is necessary we can send it directly to your pharmacy.  If lab work is needed we can place an order for that and you can then stop by our lab to have the test done at a later time.     Telephone visits are billed at different rates depending on your insurance coverage. During this emergency period, for some insurers they may be billed the same as an in-person visit.  Please reach out to your insurance provider with any questions.     If during the course of the call the physician/provider feels a telephone visit is not appropriate, you will not be charged for this service.\"     Patient has given verbal consent for Telephone visit?  Yes     How would you like to obtain your AVS? Thierry     Phone call duration: 9 min (phone call started at 2:08 PM)    HPI: Mr. Parish Driscoll is a 89 year old  male with PMH significant for    -Hypertension  -Permanent atrial fibrillation on anticoagulation and rate control    Patient was diagnosed with permanent atrial fibrillation a year ago on twelve-lead EKG. Patient was last seen in cardiology by my colleague Dr. Arora February of this year.  He is on Eliquis 5 mg which he tells me that he is taking once " daily (apparently he was told by a provider that he cannot remember), aspirin, metoprolol 25 twice daily, lisinopril 40 mg and amlodipine 5 mg.    He reports no symptoms.  He reports feeling well.    Echocardiogram 8/2020 showed normal biventricular function with no valvular disease.    Medications, personal, family, and social history reviewed with patient and revised.    PAST MEDICAL HISTORY:  Past Medical History:   Diagnosis Date     Hematuria, microscopic      Hernia, inguinal 6-    Dr. Spann     HTN (hypertension)      Hyperlipidemia LDL goal < 130      Hypertension goal BP (blood pressure) < 140/90      Hypothyroidism     Dr. Gardner/ Dr. Longoria     Prostate cancer (H)        CURRENT MEDICATIONS:  Current Outpatient Medications   Medication Sig Dispense Refill     amLODIPine (NORVASC) 5 MG tablet TAKE 1 TABLET DAILY 90 tablet 3     apixaban ANTICOAGULANT (ELIQUIS ANTICOAGULANT) 5 MG tablet Take 1 tablet (5 mg) by mouth 2 times daily 180 tablet 3     aspirin 81 MG EC tablet Take 81 mg by mouth daily       augmented betamethasone dipropionate (DIPROLENE-AF) 0.05 % external cream APPLY SPARINGLY TO AFFECTED AREA TWICE DAILY AS NEEDED.  DO NOT APPLY TO FACE.  Use for 14 days. 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     CYSTOSCOPY  06/2003     TONSILLECTOMY      at age 12       ALLERGIES:     Allergies   Allergen Reactions     Mold      Nkda [No Known Drug Allergies]      Seasonal Allergies Other (See Comments)     Runny nose       FAMILY HISTORY:  Family History    Problem Relation Age of Onset     Hypertension Mother      Hypertension Father      Depression Father      Asthma Sister      Depression Sister      Diabetes Maternal Grandmother      Cancer Maternal Grandfather         Stomach     Heart Disease No family hx of          SOCIAL HISTORY:  Social History     Tobacco Use     Smoking status: Former Smoker     Years: 15.00     Types: Cigarettes     Quit date: 1960     Years since quittin.8     Smokeless tobacco: Never Used   Substance Use Topics     Alcohol use: No     Drug use: No        I have reviewed the labs and personally reviewed the imaging below and made my comment in the assessment and plan.    Labs:  CBC RESULTS:   Lab Results   Component Value Date    WBC 3.9 (L) 10/04/2021    WBC 3.7 (L) 2021    RBC 3.62 (L) 10/04/2021    RBC 4.06 (L) 2021    HGB 12.1 (L) 10/04/2021    HGB 13.1 (L) 2021    HCT 36.1 (L) 10/04/2021    HCT 40.0 2021     10/04/2021    MCV 99 2021    MCH 33.4 (H) 10/04/2021    MCH 32.3 2021    MCHC 33.5 10/04/2021    MCHC 32.8 2021    RDW 13.5 10/04/2021    RDW 14.0 2021     10/04/2021     2021       BMP RESULTS:  Lab Results   Component Value Date     10/04/2021     2021    POTASSIUM 4.7 10/04/2021    POTASSIUM 5.2 2021    CHLORIDE 102 10/04/2021    CHLORIDE 103 2021    CO2 31 10/04/2021    CO2 31 2021    ANIONGAP 3 10/04/2021    ANIONGAP 3 2021     (H) 10/04/2021    GLC 98 2021    BUN 28 10/04/2021    BUN 36 (H) 2021    CR 1.24 10/04/2021    CR 1.27 (H) 2021    GFRESTIMATED 49 (L) 10/04/2021    GFRESTIMATED 48 (L) 2021    GFRESTBLACK 55 (L) 2021    BRYAN 9.2 10/04/2021    BRYAN 9.7 2021        Echocardiogram 2020  Global and regional left ventricular function is normal with an EF of 60-65%.  Right ventricular function, chamber size, wall motion, and thickness are  normal.  No  significant valvular abnormalities were noted.  The inferior vena cava was normal in size with preserved respiratory  variability with normal RA pressure.  Previous study not available for comparison.    EKG 7/24/2020 shows atrial flutter, LVH, 76 bpm    Assessment and Plan:   Patient is being seen today via telephone telehealth visit for follow-up.  He reports doing well.    #Permanent atrial fibrillation  -Patient has been using Eliquis 5 mg once daily over the last few months.  Recommend to take Eliquis 5 mg twice daily.  -Stop aspirin  -Continue metoprolol 25 mg twice daily for rate control    #Hypertension  -Well-controlled with amlodipine, lisinopril and metoprolol.    Follow-up with primary care physician.    Return to clinic with me as needed.    Total time spent today for this visit is 45 minutes including precharting, telehealth telephone visit, review of labs/imaging and medical documentation.    Please donot hesitate to contact me if you have any questions or concerns. Again, thank you for allowing me to participate in the care of your patient.    Pio HUNTLEY MD  [facility] Division of Cardiology  Pager [phone]      Please do not hesitate to contact me if you have any questions/concerns.     Sincerely,     Pio Huntley MD

## 2022-03-27 DIAGNOSIS — E03.9 HYPOTHYROIDISM, UNSPECIFIED TYPE: ICD-10-CM

## 2022-03-28 RX ORDER — LEVOTHYROXINE SODIUM 125 UG/1
TABLET ORAL
Qty: 30 TABLET | Refills: 3 | Status: SHIPPED | OUTPATIENT
Start: 2022-03-28 | End: 2022-05-26

## 2022-03-28 NOTE — TELEPHONE ENCOUNTER
"Routing refill request to provider for review/approval because:  Labs not current:  TSH; eileen refill sent.      Requested Prescriptions   Pending Prescriptions Disp Refills     levothyroxine (SYNTHROID/LEVOTHROID) 125 MCG tablet [Pharmacy Med Name: L-THYROXINE (SYNTHROID) TABS 125MCG] 90 tablet 3     Sig: TAKE 1 TABLET DAILY (REPLACES 112 MCG TABLET)       Thyroid Protocol Failed - 3/27/2022 11:54 PM        Failed - Normal TSH on file in past 12 months     Recent Labs   Lab Test 10/04/21  1507   TSH 5.71*              Passed - Patient is 12 years or older        Passed - Recent (12 mo) or future (30 days) visit within the authorizing provider's specialty     Patient has had an office visit with the authorizing provider or a provider within the authorizing providers department within the previous 12 mos or has a future within next 30 days. See \"Patient Info\" tab in inbasket, or \"Choose Columns\" in Meds & Orders section of the refill encounter.              Passed - Medication is active on med list           Pam SINGH RN, BSN  [facility]    "

## 2022-05-25 ENCOUNTER — MYC MEDICAL ADVICE (OUTPATIENT)
Dept: FAMILY MEDICINE | Facility: CLINIC | Age: 87
End: 2022-05-25
Payer: COMMERCIAL

## 2022-05-25 DIAGNOSIS — I10 ESSENTIAL HYPERTENSION WITH GOAL BLOOD PRESSURE LESS THAN 140/90: ICD-10-CM

## 2022-05-25 DIAGNOSIS — I48.19 PERSISTENT ATRIAL FIBRILLATION (H): ICD-10-CM

## 2022-05-25 DIAGNOSIS — E03.9 HYPOTHYROIDISM, UNSPECIFIED TYPE: ICD-10-CM

## 2022-05-26 RX ORDER — AMLODIPINE BESYLATE 5 MG/1
TABLET ORAL
Qty: 90 TABLET | Refills: 3 | Status: SHIPPED | OUTPATIENT
Start: 2022-05-26

## 2022-05-26 RX ORDER — LISINOPRIL 40 MG/1
40 TABLET ORAL DAILY
Qty: 90 TABLET | Refills: 3 | Status: SHIPPED | OUTPATIENT
Start: 2022-05-26

## 2022-05-26 RX ORDER — LEVOTHYROXINE SODIUM 125 UG/1
TABLET ORAL
Qty: 90 TABLET | Refills: 1 | Status: SHIPPED | OUTPATIENT
Start: 2022-05-26 | End: 2022-10-05

## 2022-05-26 RX ORDER — METOPROLOL TARTRATE 25 MG/1
25 TABLET, FILM COATED ORAL 2 TIMES DAILY
Qty: 180 TABLET | Refills: 3 | Status: SHIPPED | OUTPATIENT
Start: 2022-05-26

## 2022-05-26 NOTE — TELEPHONE ENCOUNTER
"Requested Prescriptions   Pending Prescriptions Disp Refills     apixaban ANTICOAGULANT (ELIQUIS ANTICOAGULANT) 5 MG tablet 180 tablet 3     Sig: Take 1 tablet (5 mg) by mouth 2 times daily       Direct Oral Anticoagulant Agents Failed - 5/26/2022  7:51 AM        Failed - Patient is 18-79 years of age        Failed - Recent (6 mo) or future (30 days) visit within the authorizing provider's specialty        Passed - Normal Platelets on file in past 12 months     Recent Labs   Lab Test 10/04/21  1507                  Passed - Medication is active on med list        Passed - Serum creatinine less than or equal to 1.4 on file in past 12 mos     Recent Labs   Lab Test 10/04/21  1507   CR 1.24       Ok to refill medication if creatinine is low          Passed - Weight is greater than 60 kg for the past year     Wt Readings from Last 3 Encounters:   10/04/21 66.7 kg (147 lb)   04/19/21 68.9 kg (152 lb)   10/05/20 66.9 kg (147 lb 8 oz)                levothyroxine (SYNTHROID/LEVOTHROID) 125 MCG tablet 30 tablet 3     Sig: TAKE 1 TABLET DAILY (REPLACES 112 MCG TABLET)       Thyroid Protocol Failed - 5/26/2022  7:51 AM        Failed - Normal TSH on file in past 12 months     Recent Labs   Lab Test 10/04/21  1507   TSH 5.71*              Passed - Patient is 12 years or older        Passed - Recent (12 mo) or future (30 days) visit within the authorizing provider's specialty     Patient has had an office visit with the authorizing provider or a provider within the authorizing providers department within the previous 12 mos or has a future within next 30 days. See \"Patient Info\" tab in inbasket, or \"Choose Columns\" in Meds & Orders section of the refill encounter.              Passed - Medication is active on med list         Signed Prescriptions Disp Refills    amLODIPine (NORVASC) 5 MG tablet 90 tablet 3     Sig: TAKE 1 TABLET DAILY       Calcium Channel Blockers Protocol  Passed - 5/26/2022  7:51 AM        Passed - " "Blood pressure under 140/90 in past 12 months     BP Readings from Last 3 Encounters:   10/04/21 130/70   04/19/21 136/71   10/05/20 124/62                 Passed - Recent (12 mo) or future (30 days) visit within the authorizing provider's specialty     Patient has had an office visit with the authorizing provider or a provider within the authorizing providers department within the previous 12 mos or has a future within next 30 days. See \"Patient Info\" tab in inbasket, or \"Choose Columns\" in Meds & Orders section of the refill encounter.              Passed - Medication is active on med list        Passed - Patient is age 18 or older        Passed - Normal serum creatinine on file in past 12 months     Recent Labs   Lab Test 10/04/21  1507   CR 1.24       Ok to refill medication if creatinine is low            lisinopril (ZESTRIL) 40 MG tablet 90 tablet 3     Sig: Take 1 tablet (40 mg) by mouth daily       ACE Inhibitors (Including Combos) Protocol Passed - 5/26/2022  7:51 AM        Passed - Blood pressure under 140/90 in past 12 months     BP Readings from Last 3 Encounters:   10/04/21 130/70   04/19/21 136/71   10/05/20 124/62                 Passed - Recent (12 mo) or future (30 days) visit within the authorizing provider's specialty     Patient has had an office visit with the authorizing provider or a provider within the authorizing providers department within the previous 12 mos or has a future within next 30 days. See \"Patient Info\" tab in inHeyAnitaet, or \"Choose Columns\" in Meds & Orders section of the refill encounter.              Passed - Medication is active on med list        Passed - Patient is age 18 or older        Passed - Normal serum creatinine on file in past 12 months     Recent Labs   Lab Test 10/04/21  1507   CR 1.24       Ok to refill medication if creatinine is low          Passed - Normal serum potassium on file in past 12 months     Recent Labs   Lab Test 10/04/21  1507   POTASSIUM 4.7      " "         metoprolol tartrate (LOPRESSOR) 25 MG tablet 180 tablet 3     Sig: Take 1 tablet (25 mg) by mouth 2 times daily       Beta-Blockers Protocol Passed - 5/26/2022  7:51 AM        Passed - Blood pressure under 140/90 in past 12 months     BP Readings from Last 3 Encounters:   10/04/21 130/70   04/19/21 136/71   10/05/20 124/62                 Passed - Patient is age 6 or older        Passed - Recent (12 mo) or future (30 days) visit within the authorizing provider's specialty     Patient has had an office visit with the authorizing provider or a provider within the authorizing providers department within the previous 12 mos or has a future within next 30 days. See \"Patient Info\" tab in inbasket, or \"Choose Columns\" in Meds & Orders section of the refill encounter.              Passed - Medication is active on med list           Routing refill request to provider for review/approval because:  Labs not current:  TSH  Age, and due for f/u visit    ThanksMarissa RN  [facility]           "

## 2022-08-15 ENCOUNTER — TELEPHONE (OUTPATIENT)
Dept: FAMILY MEDICINE | Facility: CLINIC | Age: 87
End: 2022-08-15

## 2022-08-15 DIAGNOSIS — M54.9 CHRONIC BACK PAIN, UNSPECIFIED BACK LOCATION, UNSPECIFIED BACK PAIN LATERALITY: Primary | ICD-10-CM

## 2022-08-15 DIAGNOSIS — Z91.81 HISTORY OF FALL: ICD-10-CM

## 2022-08-15 DIAGNOSIS — G89.29 CHRONIC BACK PAIN, UNSPECIFIED BACK LOCATION, UNSPECIFIED BACK PAIN LATERALITY: Primary | ICD-10-CM

## 2022-08-15 NOTE — TELEPHONE ENCOUNTER
Reason for Call: Request for an order or referral:    Order or referral being requested: PT OT orders     Date needed: as soon as possible    Has the patient been seen by the PCP for this problem? YES    Additional comments: Patients sister calling to report that patient is getting weaker as his health is declining. She is wanting orders for PT and OT    Phone number Patient can be reached at:  Other phone number:  Deanne is his sister and can be reached at [phone]    Best Time:  Day     Can we leave a detailed message on this number?  YES    Call taken on 8/15/2022 at 12:01 PM by TORI DOOLEY

## 2022-08-16 PROBLEM — G89.29 CHRONIC BACK PAIN: Status: ACTIVE | Noted: 2022-08-16

## 2022-08-16 PROBLEM — M54.9 CHRONIC BACK PAIN: Status: ACTIVE | Noted: 2022-08-16

## 2022-08-16 PROBLEM — Z91.81 HISTORY OF FALL: Status: ACTIVE | Noted: 2022-08-16

## 2022-08-16 NOTE — TELEPHONE ENCOUNTER
Need more info - what diagnosis and symptoms? Is he open to coming in to clinic or is this a request for [facility]? If so, a face to face appointment is required.   Tameka Man MD

## 2022-08-16 NOTE — TELEPHONE ENCOUNTER
Reached out to patient's sister to gather more information as indicated below. The request for PT/OT referral would be through "[employer] ([phone]). Patient would have these services come to his senior living apartment. "[employer] contracts with his senior living apartment. Again, patient's sister declined triage and also declined scheduling an appointment, both virtual and in person. She did state that patient had a bad fall 2 years prior, he suffers from back pain that at times causes difficulty walking and he is becoming deconditioned d/t this.    SHANTELL ValadezN RN  [facility], [facility]

## 2022-08-16 NOTE — TELEPHONE ENCOUNTER
Insurance won't cover the cost of home physical therapy/OT without a face to face visit within 30 days of referral - I think this can be done virtually during the pandemic   Tameka Man MD

## 2022-08-17 ENCOUNTER — MYC MEDICAL ADVICE (OUTPATIENT)
Dept: FAMILY MEDICINE | Facility: CLINIC | Age: 87
End: 2022-08-17

## 2022-08-17 NOTE — TELEPHONE ENCOUNTER
"Dr. Man,     Spoke with pts sister and helped schedule for Mon the 22nd for virtual telephone as \"pt cannot leave the home\", and \"really needs the home care help\". Ok to close encounter unless something else needed from the team.     Thanks,  OVI Ann  [facility]     "

## 2022-08-18 PROBLEM — R41.3 MEMORY PROBLEM: Status: ACTIVE | Noted: 2022-08-18

## 2022-08-18 PROBLEM — S32.040G CLOSED COMPRESSION FRACTURE OF L4 LUMBAR VERTEBRA WITH DELAYED HEALING, SUBSEQUENT ENCOUNTER: Status: RESOLVED | Noted: 2019-06-05 | Resolved: 2022-08-18

## 2022-08-18 PROBLEM — I48.20 ATRIAL FIBRILLATION, CHRONIC (H): Status: ACTIVE | Noted: 2020-10-05

## 2022-08-18 PROBLEM — M81.0 OSTEOPOROSIS: Status: ACTIVE | Noted: 2022-08-18

## 2022-08-22 ENCOUNTER — TELEPHONE (OUTPATIENT)
Dept: FAMILY MEDICINE | Facility: CLINIC | Age: 87
End: 2022-08-22

## 2022-08-22 ENCOUNTER — VIRTUAL VISIT (OUTPATIENT)
Dept: FAMILY MEDICINE | Facility: CLINIC | Age: 87
End: 2022-08-22
Payer: COMMERCIAL

## 2022-08-22 DIAGNOSIS — M54.9 CHRONIC BACK PAIN, UNSPECIFIED BACK LOCATION, UNSPECIFIED BACK PAIN LATERALITY: ICD-10-CM

## 2022-08-22 DIAGNOSIS — G89.29 CHRONIC BACK PAIN, UNSPECIFIED BACK LOCATION, UNSPECIFIED BACK PAIN LATERALITY: ICD-10-CM

## 2022-08-22 DIAGNOSIS — I48.19 PERSISTENT ATRIAL FIBRILLATION (H): ICD-10-CM

## 2022-08-22 DIAGNOSIS — E78.5 HYPERLIPIDEMIA WITH TARGET LDL LESS THAN 130: ICD-10-CM

## 2022-08-22 DIAGNOSIS — R41.3 MEMORY PROBLEM: Primary | ICD-10-CM

## 2022-08-22 DIAGNOSIS — E03.9 HYPOTHYROIDISM, UNSPECIFIED TYPE: ICD-10-CM

## 2022-08-22 DIAGNOSIS — N18.31 STAGE 3A CHRONIC KIDNEY DISEASE (H): ICD-10-CM

## 2022-08-22 DIAGNOSIS — D80.8 LIGHT CHAIN DISEASE (H): ICD-10-CM

## 2022-08-22 DIAGNOSIS — E44.1 MILD PROTEIN-CALORIE MALNUTRITION (H): ICD-10-CM

## 2022-08-22 DIAGNOSIS — I10 ESSENTIAL HYPERTENSION WITH GOAL BLOOD PRESSURE LESS THAN 140/90: ICD-10-CM

## 2022-08-22 PROCEDURE — 99214 OFFICE O/P EST MOD 30 MIN: CPT | Mod: 95 | Performed by: FAMILY MEDICINE

## 2022-08-22 ASSESSMENT — PATIENT HEALTH QUESTIONNAIRE - PHQ9: SUM OF ALL RESPONSES TO PHQ QUESTIONS 1-9: 9

## 2022-08-22 NOTE — PROGRESS NOTES
Parish is a 89 year old who is being evaluated via a billable telephone visit.      What phone number would you like to be contacted at? [phone]  How would you like to obtain your AVS? Thierry    Assessment & Plan     (R41.3) Memory problem  (primary encounter diagnosis)  Comment: Differential diagnoses would include: dementia - no reversible cause seen by prior testing - suspect Alzheimer's disease   Plan: Home Care Referral, Primary Care - Care         Coordination Referral        Follow-up recommended for further memory testing; consider imaging and referral     (E44.1) Mild protein-calorie malnutrition (H)  Plan: Home Care Referral, Primary Care - Care         Coordination Referral           (M54.9,  G89.29) Chronic back pain, unspecified back location, unspecified back pain laterality  Plan: Home Care Referral, Primary Care - Care         Coordination Referral        Tylenol as needed. Therapy per [facility]. Call or return to clinic as needed if these symptoms worsen or fail to improve       (I48.19) Persistent atrial fibrillation (H)  Comment: rate controlled and anticoagulated   Plan: Home Care Referral, Primary Care - Care         Coordination Referral          (N18.31) Stage 3a chronic kidney disease (H)  (I10) Essential hypertension with goal blood pressure less than 140/90  Comment: Well controlled with medications without side effects.   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    Tameka Man MD  [facility] LEXA Lugo is a 89 year old accompanied by his sister and michael, presenting for the following health issues:  home care referral      HPI   Parish Driscoll is a 89 year old male who presents with chronic memory problem and back pain. Symptom onset has been gradual, worsening for a time period of years. Severity is described as moderate. Course of his symptoms over time is worsening. His niece and sister worry that he needs more assistance or maybe even a move to assisted living.     He reports to get regular meals but has difficulty ambulating and caring for himself. He gave up driving years ago.                 Review of Systems   CONSTITUTIONAL: NEGATIVE for fever, chills, change in weight  ENT/MOUTH: NEGATIVE for ear, mouth and throat problems  RESP: NEGATIVE for significant cough or SOB  MUSCULOSKELETAL:back pain  NEURO: memory problems      Objective           Vitals:  No vitals were obtained today due to virtual visit.    Physical Exam   alert, no distress and cooperative  PSYCH: Alert and oriented times 3; coherent speech, normal   rate and volume, able to articulate logical thoughts, able   to abstract reason, no tangential thoughts, no hallucinations   or delusions  His affect is normal  RESP: No cough, no audible wheezing, able to talk in full sentences  Remainder of exam unable to be completed due to telephone visits    Office Visit on 10/04/2021   Component Date Value Ref Range Status     Sodium 10/04/2021 136  133 - 144 mmol/L Final     Potassium 10/04/2021 4.7  3.4 - 5.3 mmol/L Final     Chloride 10/04/2021 102  94 - 109 mmol/L Final     Carbon Dioxide (CO2) 10/04/2021 31  20 - 32 mmol/L Final     Anion Gap 10/04/2021 3  3 - 14 mmol/L Final     Urea Nitrogen 10/04/2021 28  7 - 30 mg/dL Final     Creatinine 10/04/2021 1.24  0.66 - 1.25 mg/dL Final     Calcium 10/04/2021 9.2  8.5  - 10.1 mg/dL Final     Glucose 10/04/2021 109 (A) 70 - 99 mg/dL Final     GFR Estimate 10/04/2021 49 (A) >60 mL/min/1.73m2 Final    As of July 11, 2021, eGFR is calculated by the CKD-EPI creatinine equation, without race adjustment. eGFR can be influenced by muscle mass, exercise, and diet. The reported eGFR is an estimation only and is only applicable if the renal function is stable.     TSH 10/04/2021 5.71 (A) 0.40 - 4.00 mU/L Final     [place] Free Light Chains 10/04/2021 4.40 (A) 0.33 - 1.94 mg/dL Final     [place] Free Light Chains 10/04/2021 1.82  0.57 - 2.63 mg/dL Final     Kappa /Lambda Ratio 10/04/2021 2.42 (A) 0.26 - 1.65 Final     WBC Count 10/04/2021 3.9 (A) 4.0 - 11.0 10e3/uL Final     RBC Count 10/04/2021 3.62 (A) 4.40 - 5.90 10e6/uL Final     Hemoglobin 10/04/2021 12.1 (A) 13.3 - 17.7 g/dL Final     Hematocrit 10/04/2021 36.1 (A) 40.0 - 53.0 % Final     MCV 10/04/2021 100  78 - 100 fL Final     MCH 10/04/2021 33.4 (A) 26.5 - 33.0 pg Final     MCHC 10/04/2021 33.5  31.5 - 36.5 g/dL Final     RDW 10/04/2021 13.5  10.0 - 15.0 % Final     Platelet Count 10/04/2021 176  150 - 450 10e3/uL Final     % Neutrophils 10/04/2021 59  % Final     % Lymphocytes 10/04/2021 23  % Final     % Monocytes 10/04/2021 14  % Final     % Eosinophils 10/04/2021 3  % Final     % Basophils 10/04/2021 1  % Final     Absolute Neutrophils 10/04/2021 2.3  1.6 - 8.3 10e3/uL Final     Absolute Lymphocytes 10/04/2021 0.9  0.8 - 5.3 10e3/uL Final     Absolute Monocytes 10/04/2021 0.6  0.0 - 1.3 10e3/uL Final     Absolute Eosinophils 10/04/2021 0.1  0.0 - 0.7 10e3/uL Final     Absolute Basophils 10/04/2021 0.0  0.0 - 0.2 10e3/uL Final     Free T4 10/04/2021 0.93  0.76 - 1.46 ng/dL Final     No results found for this or any previous visit (from the past 24 hour(s)).            Phone call duration: 16 minutes    .  ..

## 2022-08-22 NOTE — TELEPHONE ENCOUNTER
Routing to PCP and Care Coordination as FYI    [facility] cannot accept patient due to capacity issues.    Needs another home care agency.    Appears care coordination referral placed this AM.      RN received call from Kristian at [facility] Regarding above/    Guy Mcdonald RN, BSN, PHN  [facility]

## 2022-08-25 ENCOUNTER — PATIENT OUTREACH (OUTPATIENT)
Dept: CARE COORDINATION | Facility: CLINIC | Age: 87
End: 2022-08-25

## 2022-08-25 NOTE — PROGRESS NOTES
Community Health Worker Initial Outreach    CHW Initial Information Gathering:  Referral Source: PCP  Preferred Hospital: [facility]  [phone]  Preferred Urgent Care: Other  Current living arrangement:: I live alone  Type of residence:: Independent Senior Living  Community Resources: None  Supplies Currently Used at Home: None  Equipment Currently Used at Home: grab bar, toilet, grab bar, tub/shower  Informal Support system:: Family  Transportation means:: Family, Regular car  CHW Additional Questions  MyChart active?: Yes  Patient sent Social Determinants of Health questionnaire?: Yes    Patient accepts CC: Yes. Patient scheduled for assessment with CCSW on 8/29/22 at 2 pm. Patient noted desire to discuss managing care and using long term care. Patient has started services with [facility] 12 hours a week. He is able to pay out of pocket and would like to use long term care insurance when able..

## 2022-08-29 ENCOUNTER — PATIENT OUTREACH (OUTPATIENT)
Dept: NURSING | Facility: CLINIC | Age: 87
End: 2022-08-29
Attending: FAMILY MEDICINE

## 2022-08-29 DIAGNOSIS — I10 ESSENTIAL HYPERTENSION WITH GOAL BLOOD PRESSURE LESS THAN 140/90: ICD-10-CM

## 2022-08-29 DIAGNOSIS — G89.29 CHRONIC BACK PAIN, UNSPECIFIED BACK LOCATION, UNSPECIFIED BACK PAIN LATERALITY: ICD-10-CM

## 2022-08-29 DIAGNOSIS — N18.31 STAGE 3A CHRONIC KIDNEY DISEASE (H): ICD-10-CM

## 2022-08-29 DIAGNOSIS — E78.5 HYPERLIPIDEMIA WITH TARGET LDL LESS THAN 130: ICD-10-CM

## 2022-08-29 DIAGNOSIS — D80.8 LIGHT CHAIN DISEASE (H): ICD-10-CM

## 2022-08-29 DIAGNOSIS — M54.9 CHRONIC BACK PAIN, UNSPECIFIED BACK LOCATION, UNSPECIFIED BACK PAIN LATERALITY: ICD-10-CM

## 2022-08-29 DIAGNOSIS — R41.3 MEMORY PROBLEM: ICD-10-CM

## 2022-08-29 DIAGNOSIS — E03.9 HYPOTHYROIDISM, UNSPECIFIED TYPE: ICD-10-CM

## 2022-08-29 DIAGNOSIS — E44.1 MILD PROTEIN-CALORIE MALNUTRITION (H): ICD-10-CM

## 2022-08-29 DIAGNOSIS — I48.19 PERSISTENT ATRIAL FIBRILLATION (H): ICD-10-CM

## 2022-08-29 NOTE — PROGRESS NOTES
Clinic Care Coordination Contact    Patient scheduled for initial SW phone assessment today.  SW initially called patient, then called dimitrios Monroe. Patient approved verbal consent to talk with niece.  Consent to Communicate not on file.  We discussed this the importance and purpose of this form.  RANJITH will send one to patient's home for dimitrios to assist with completion and return to the clinic.  Dimitrios reports having recently had consult with [facility] private home care with plan for 12 hours of care (uncertain of frequency).  Dimitrios reports that patient has LTC insurance with [employer], she has called them and discussed this process.  She is uncertain if there is a waiting period for eligibility of LTC insurance benefits.  She will call and discuss further.  Nilucia reports patient qualifies for 4 ADL's (bathing, toileting/bladder incontinence, transferring and dressing).  Nilucia inquired on a RN coming to the home per his provider, RANJITH noted that patient has orders for PT, RN and SW.  [facility] at capacity, this was referred to [facility] to assist with finding alternate home care agency.  Dimitrios stated the family is uncertain of what level of care is needed for patient, we discussed a neuropsychology assessment.  Patient has appt in early October, they will discuss at that appt.  RANJITH offered future call, this was declined.  RANJITH provided her contact information to patient's niece, she prefers to call with future questions as has many other steps to complete currently.      RANJITH will update PCP and close to Care Coordination     Samantha Kent, IQRA, MSW   [facility]  Care Coordination  [facility]  [phone]  8/29/2022 2:44 PM

## 2022-08-29 NOTE — LETTER
[facility] CARE COORDINATION  [address] [place] [address]    August 29, 2022    Parish Driscoll  [address] [address]      Dear Parish,        I am a clinic care coordinator who works with Tameka Man MD with the [facility]. I wanted to thank you for spending the time to talk with me.  Below is a description of clinic care coordination and how I can further assist you.      As discussed with your niece today, I am sending the link to complete a Consent to Communicate form.  Please have your family complete with you as needed.  YOU must complete the date and sign.  Please ensure the boxes are completed of what can be shared with the clinic/care team.  Please return to the clinic care team.  Attention to your provider. Thank you      CONSENT TO COMMUNICATE FORM:     Person-to-Person Communication  To help with my care or billing, my care team may discuss detailed information with the people listed below. I understand   this form is optional and is used to allow verbal communication between my caregivers and those listed below. It may also   let these persons  medicines or papers on my behalf (if so detailed at the bottom of the form).   _______________________________________ ___________________ __________________   First and last name (please print) Relationship to patient Best contact number  Please share: ? Scheduling information ? Medical information ? Billing information ?  items   _______________________________________ ___________________ __________________   First and last name (please print) Relationship to patient Best contact number  Please share: ? Scheduling information ? Medical information ? Billing information ?  items   _______________________________________ ___________________ __________________   First and last name (please print) Relationship to patient Best contact number  Please share: ? Scheduling  information ? Medical information ? Billing information ?  items  I understand the following:    This consent applies to [facility], [facility], [facility], [facility], [facility], and [facility] and to the   information in the common electronic health record used by those organizations and other clinics. The clinics are   listed at [email or web address].    This authorization does not include access to or copies of my medical record. I must fill out another form for this.    If the person or persons listed will be involved in my health care decisions, I must appoint them as a health care   agent through a health care directive or other legal appointment.    This form does not have an end date. If I want to change the information on this form, I will fill out a new form. If I   want to add or remove people for person-to-person communication, I will fill out another form.    Once my information is shared with the person or persons named above, it may no longer be protected by privacy laws.   We cannot prevent these persons from sharing my information with a third party.    If I do not sign this form, I will still be treated.     There are no restrictions on the information checked above that may be discussed. If I wish to exclude specific   information (such as treatment for mental health, chemical dependency or infectious disease), I will detail those   instructions here:    ______________________________________________ ____________________________________ _______________  Signature of patient or authorized person Print name Date/Time   ______________________________________________   Authorized person s authority to sign (proof required)    Reason patient is unable to sign: ? Minor ? Other: _________________________________________________________     Name:_______________________________Language/Organization:___________________________________  Authorization to Share Protected   Health Information  Attach patient label here  AUTHORIZATION TO SHARE PROTECTED HEALTH INFORMATION  [id number] Rev 01/21 Consent to Communicate           The clinic care coordination team is made up of a registered nurse, , financial resource worker and community health worker who understand the health care system. The goal of clinic care coordination is to help you manage your health and improve access to the health care system. Our team works alongside your provider to assist you in determining your health and social needs. We can help you obtain health care and community resources, providing you with necessary information and education. We can work with you through any barriers and develop a care plan that helps coordinate and strengthen the communication between you and your care team.    Please feel free to contact me with any questions or concerns regarding care coordination and what we can offer.      We are focused on providing you with the highest-quality healthcare experience possible.    Sincerely,     Samantha Kent, IQRA, MSW Clinic   [facility]  Care Coordination  [facility] [email or web address]  Office: [phone]  Employed by [facility]

## 2022-09-16 ENCOUNTER — TELEPHONE (OUTPATIENT)
Dept: FAMILY MEDICINE | Facility: CLINIC | Age: 87
End: 2022-09-16

## 2022-09-16 NOTE — TELEPHONE ENCOUNTER
Since he is asymptomatic ok to monitor blood pressure at next visit.  If he develops symptoms over the weekend should go to ER.  If remaining high can address at upcoming appointment.      Gypsy Roberts PA-C

## 2022-09-16 NOTE — TELEPHONE ENCOUNTER
Received call from OVI Vargas with [facility]. She was relayed message below - verbalized understanding of this. Also states his blood pressure just now was 150/86. Routing update to provider.     MONICA Valadez RN  [facility]

## 2022-09-16 NOTE — TELEPHONE ENCOUNTER
[facility] calling and can be reached at [phone]. States pt's BP is elevated. 1st check 180/100, 2nd check 174/86. No chest pain, headaches, or dizziness. Pt has SOB, but this is not new and resolves at rest. Pt says he is taking his medications, but homecare does not do anything with his medications, so is not sure if pt is taking his medications. Pt takes medications out of the bottles and does not set up medications. Pt had all medications on med list in the home except Levothyroxine. Routing to provider to advise.    Glenda Earl RN  [facility]

## 2022-10-05 ENCOUNTER — OFFICE VISIT (OUTPATIENT)
Dept: FAMILY MEDICINE | Facility: CLINIC | Age: 87
End: 2022-10-05
Payer: COMMERCIAL

## 2022-10-05 VITALS
SYSTOLIC BLOOD PRESSURE: 138 MMHG | DIASTOLIC BLOOD PRESSURE: 70 MMHG | HEART RATE: 70 BPM | TEMPERATURE: 98.8 F | HEIGHT: 65 IN | OXYGEN SATURATION: 96 % | BODY MASS INDEX: 23.66 KG/M2 | WEIGHT: 142 LBS

## 2022-10-05 DIAGNOSIS — I48.20 ATRIAL FIBRILLATION, CHRONIC (H): ICD-10-CM

## 2022-10-05 DIAGNOSIS — Z85.46 HISTORY OF PROSTATE CANCER: ICD-10-CM

## 2022-10-05 DIAGNOSIS — Z00.00 MEDICARE ANNUAL WELLNESS VISIT, SUBSEQUENT: Primary | ICD-10-CM

## 2022-10-05 DIAGNOSIS — E03.9 HYPOTHYROIDISM, UNSPECIFIED TYPE: ICD-10-CM

## 2022-10-05 DIAGNOSIS — R26.9 GAIT ABNORMALITY: ICD-10-CM

## 2022-10-05 DIAGNOSIS — N18.31 STAGE 3A CHRONIC KIDNEY DISEASE (H): ICD-10-CM

## 2022-10-05 DIAGNOSIS — I10 ESSENTIAL HYPERTENSION WITH GOAL BLOOD PRESSURE LESS THAN 140/90: ICD-10-CM

## 2022-10-05 DIAGNOSIS — M81.0 OSTEOPOROSIS, UNSPECIFIED OSTEOPOROSIS TYPE, UNSPECIFIED PATHOLOGICAL FRACTURE PRESENCE: ICD-10-CM

## 2022-10-05 DIAGNOSIS — Z23 NEED FOR PROPHYLACTIC VACCINATION AND INOCULATION AGAINST INFLUENZA: ICD-10-CM

## 2022-10-05 DIAGNOSIS — E03.9 ACQUIRED HYPOTHYROIDISM: ICD-10-CM

## 2022-10-05 DIAGNOSIS — F03.B18 MODERATE DEMENTIA WITH OTHER BEHAVIORAL DISTURBANCE, UNSPECIFIED DEMENTIA TYPE (H): ICD-10-CM

## 2022-10-05 DIAGNOSIS — E78.5 HYPERLIPIDEMIA WITH TARGET LDL LESS THAN 130: ICD-10-CM

## 2022-10-05 LAB
BASOPHILS # BLD AUTO: 0 10E3/UL (ref 0–0.2)
BASOPHILS NFR BLD AUTO: 1 %
EOSINOPHIL # BLD AUTO: 0.1 10E3/UL (ref 0–0.7)
EOSINOPHIL NFR BLD AUTO: 1 %
ERYTHROCYTE [DISTWIDTH] IN BLOOD BY AUTOMATED COUNT: 15.3 % (ref 10–15)
HCT VFR BLD AUTO: 40.1 % (ref 40–53)
HGB BLD-MCNC: 13 G/DL (ref 13.3–17.7)
LYMPHOCYTES # BLD AUTO: 0.9 10E3/UL (ref 0.8–5.3)
LYMPHOCYTES NFR BLD AUTO: 18 %
MCH RBC QN AUTO: 32.4 PG (ref 26.5–33)
MCHC RBC AUTO-ENTMCNC: 32.4 G/DL (ref 31.5–36.5)
MCV RBC AUTO: 100 FL (ref 78–100)
MONOCYTES # BLD AUTO: 0.6 10E3/UL (ref 0–1.3)
MONOCYTES NFR BLD AUTO: 13 %
NEUTROPHILS # BLD AUTO: 3.4 10E3/UL (ref 1.6–8.3)
NEUTROPHILS NFR BLD AUTO: 68 %
PLATELET # BLD AUTO: 208 10E3/UL (ref 150–450)
RBC # BLD AUTO: 4.01 10E6/UL (ref 4.4–5.9)
WBC # BLD AUTO: 5.1 10E3/UL (ref 4–11)

## 2022-10-05 PROCEDURE — 84439 ASSAY OF FREE THYROXINE: CPT | Performed by: FAMILY MEDICINE

## 2022-10-05 PROCEDURE — 36415 COLL VENOUS BLD VENIPUNCTURE: CPT | Performed by: FAMILY MEDICINE

## 2022-10-05 PROCEDURE — 84443 ASSAY THYROID STIM HORMONE: CPT | Performed by: FAMILY MEDICINE

## 2022-10-05 PROCEDURE — 85025 COMPLETE CBC W/AUTO DIFF WBC: CPT | Performed by: FAMILY MEDICINE

## 2022-10-05 PROCEDURE — G0008 ADMIN INFLUENZA VIRUS VAC: HCPCS | Performed by: FAMILY MEDICINE

## 2022-10-05 PROCEDURE — 82043 UR ALBUMIN QUANTITATIVE: CPT | Performed by: FAMILY MEDICINE

## 2022-10-05 PROCEDURE — 90662 IIV NO PRSV INCREASED AG IM: CPT | Performed by: FAMILY MEDICINE

## 2022-10-05 PROCEDURE — 80048 BASIC METABOLIC PNL TOTAL CA: CPT | Performed by: FAMILY MEDICINE

## 2022-10-05 PROCEDURE — G0439 PPPS, SUBSEQ VISIT: HCPCS | Performed by: FAMILY MEDICINE

## 2022-10-05 PROCEDURE — 99213 OFFICE O/P EST LOW 20 MIN: CPT | Mod: 25 | Performed by: FAMILY MEDICINE

## 2022-10-05 RX ORDER — LEVOTHYROXINE SODIUM 125 UG/1
TABLET ORAL
Qty: 90 TABLET | Refills: 3 | Status: SHIPPED | OUTPATIENT
Start: 2022-10-05 | End: 2022-11-04 | Stop reason: DRUGHIGH

## 2022-10-05 ASSESSMENT — ENCOUNTER SYMPTOMS
WEAKNESS: 1
EYE PAIN: 0
CONSTIPATION: 0
SORE THROAT: 0
HEMATURIA: 0
FEVER: 0
MYALGIAS: 1
PARESTHESIAS: 0
SHORTNESS OF BREATH: 0
HEARTBURN: 0
WEAKNESS: 0
ABDOMINAL PAIN: 0
PALPITATIONS: 0
FREQUENCY: 1
HEMATOCHEZIA: 0
DIZZINESS: 0
ARTHRALGIAS: 1
NERVOUS/ANXIOUS: 1
DIARRHEA: 0
HEADACHES: 0
DYSURIA: 0
CHILLS: 1
NAUSEA: 0
COUGH: 0
SHORTNESS OF BREATH: 1
JOINT SWELLING: 0

## 2022-10-05 ASSESSMENT — ACTIVITIES OF DAILY LIVING (ADL)
CURRENT_FUNCTION: HOUSEWORK REQUIRES ASSISTANCE
CURRENT_FUNCTION: BATHING REQUIRES ASSISTANCE
CURRENT_FUNCTION: PREPARING MEALS REQUIRES ASSISTANCE
CURRENT_FUNCTION: LAUNDRY REQUIRES ASSISTANCE
CURRENT_FUNCTION: TRANSPORTATION REQUIRES ASSISTANCE
CURRENT_FUNCTION: SHOPPING REQUIRES ASSISTANCE
CURRENT_FUNCTION: MONEY MANAGEMENT REQUIRES ASSISTANCE

## 2022-10-05 ASSESSMENT — PAIN SCALES - GENERAL: PAINLEVEL: NO PAIN (0)

## 2022-10-05 NOTE — PATIENT INSTRUCTIONS
At your visit today, we discussed your risk for falls and preventive options.    Fall Prevention  Falls often occur due to slipping, tripping or losing your balance. Millions of people fall every year and injure themselves. Here are ways to reduce your risk of falling again.     Think about your fall, was there anything that caused your fall that can be fixed, removed, or replaced?    Make your home safe by keeping walkways clear of objects you may trip over, such as electric cords.    Use non-slip pads under rugs. Don't use area rugs or small throw rugs.    Use non-slip mats in bathtubs and showers.    Install handrails and lights on staircases. The handrails should be on both sides of the stairs.    Don't walk in poorly lit areas.    Don't stand on chairs or wobbly ladders.    Use caution when reaching overhead or looking upward. This position can cause a loss of balance.    Be sure your shoes fit properly, have non-slip bottoms and are in good condition.     Wear shoes both inside and out. Don't go barefoot or wear slippers.    Be cautious when going up and down stairs, curbs, and when walking on uneven sidewalks.    If your balance is poor, consider using a cane or walker.    If your fall was related to alcohol use, stop or limit alcohol intake.     If your fall was related to use of sleeping medicines, talk to your healthcare provider about this. You may need to reduce your dosage at bedtime if you awaken during the night to go to the bathroom.      To reduce the need for nighttime bathroom trips:  ? Don't drink fluids for several hours before going to bed  ? Empty your bladder before going to bed  ? Men can keep a urinal at the bedside    Stay as active as you can. Balance, flexibility, strength, and endurance all come from exercise. They all play a role in preventing falls. Ask your healthcare provider which types of activity are right for you.    Get your vision checked on a regular basis.    If you have  pets, know where they are before you stand up or walk so you don't trip over them.    Use night lights.    Go over all your medicines with a pharmacist or other healthcare provider to see if any of them could make you more likely to fall.  [employer] last reviewed this educational content on 4/1/2018 2000-2021 [employer]. All rights reserved. This information is not intended as a substitute for professional medical care. Always follow your healthcare professional's instructions.        Patient Education   Personalized Prevention Plan  You are due for the preventive services outlined below.  Your care team is available to assist you in scheduling these services.  If you have already completed any of these items, please share that information with your care team to update in your medical record.  Health Maintenance Due   Topic Date Due     Kidney Microalbumin Urine Test  Never done     Cholesterol Lab  02/04/2016     Diptheria Tetanus Pertussis (DTAP/TDAP/TD) Vaccine (3 - Td or Tdap) 06/27/2021     COVID-19 Vaccine (4 - Booster for Moderna series) 01/28/2022     Flu Vaccine (1) 09/01/2022     Basic Metabolic Panel  10/04/2022     Complete Blood Count  10/04/2022     Thyroid Function Lab  10/04/2022     Hemoglobin  10/04/2022     Your Health Risk Assessment indicates you feel you are not in good health    A healthy lifestyle helps keep the body fit and the mind alert. It helps protect you from disease, helps you fight disease, and helps prevent chronic disease (disease that doesn't go away) from getting worse. This is important as you get older and begin to notice twinges in muscles and joints and a decline in the strength and stamina you once took for granted. A healthy lifestyle includes good healthcare, good nutrition, weight control, recreation, and regular exercise. Avoid harmful substances and do what you can to keep safe. Another part of a healthy lifestyle is stay mentally active and socially  involved.    Good healthcare     Have a wellness visit every year.     If you have new symptoms, let us know right away. Don't wait until the next checkup.     Take medicines exactly as prescribed and keep your medicines in a safe place. Tell us if your medicine causes problems.   Healthy diet and weight control     Eat 3 or 4 small, nutritious, low-fat, high-fiber meals a day. Include a variety of fruits, vegetables, and whole-grain foods.     Make sure you get enough calcium in your diet. Calcium, vitamin D, and exercise help prevent osteoporosis (bone thinning).     If you live alone, try eating with others when you can. That way you get a good meal and have company while you eat it.     Try to keep a healthy weight. If you eat more calories than your body uses for energy, it will be stored as fat and you will gain weight.     Recreation   Recreation is not limited to sports and team events. It includes any activity that provides relaxation, interest, enjoyment, and exercise. Recreation provides an outlet for physical, mental, and social energy. It can give a sense of worth and achievement. It can help you stay healthy.    Mental Exercise and Social Involvement  Mental and emotional health is as important as physical health. Keep in touch with friends and family. Stay as active as possible. Continue to learn and challenge yourself.   Things you can do to stay mentally active are:    Learn something new, like a foreign language or musical instrument.     Play SCRABBLE or do crossword puzzles. If you cannot find people to play these games with you at home, you can play them with others on your computer through the Internet.     Join a games club--anything from card games to chess or checkers or lawn bowling.     Start a new hobby.     Go back to school.     Volunteer.     Read.   Keep up with world events.    Exercise for a Healthier Heart  You may wonder how you can improve the health of your heart. If you re  thinking about exercise, you re on the right track. You don t need to become an athlete. But you do need a certain amount of brisk exercise to help strengthen your heart. If you have been diagnosed with a heart condition, your healthcare provider may advise exercise to help stabilize your condition. To help make exercise a habit, choose safe, fun activities.      Exercise with a friend. When activity is fun, you're more likely to stick with it.   Before you start  Check with your healthcare provider before starting an exercise program. This is especially important if you have not been active for a while. It's also important if you have a long-term (chronic) health problem such as heart disease, diabetes, or obesity. Or if you are at high risk for having these problems.   Why exercise?  Exercising regularly offers many healthy rewards. It can help you do all of the following:     Improve your blood cholesterol level to help prevent further heart trouble    Lower your blood pressure to help prevent a stroke or heart attack    Control diabetes, or reduce your risk of getting this disease    Improve your heart and lung function    Reach and stay at a healthy weight    Make your muscles stronger so you can stay active    Prevent falls and fractures by slowing the loss of bone mass (osteoporosis)    Manage stress better    Reduce your blood pressure    Improve your sense of self and your body image  Exercise tips      Ease into your routine. Set small goals. Then build on them. If you are not sure what your activity level should be, talk with your healthcare provider first before starting an exercise routine.    Exercise on most days. Aim for a total of 150 minutes (2 hours and 30 minutes) or more of moderate-intensity aerobic activity each week. Or 75 minutes (1 hour and 15 minutes) or more of vigorous-intensity aerobic activity each week. Or try for a combination of both. Moderate activity means that you breathe heavier  and your heart rate increases but you can still talk. Think about doing 40 minutes of moderate exercise, 3 to 4 times a week. For best results, activity should last for about 40 minutes to lower blood pressure and cholesterol. It's OK to work up to the 40-minute period over time. Examples of moderate-intensity activity are walking 1 mile in 15 minutes. Or doing 30 to 45 minutes of yard work.    Step up your daily activity level.  Along with your exercise program, try being more active the whole day. Walk instead of drive. Or park further away so that you take more steps each day. Do more household tasks or yard work. You may not be able to meet the advised mount of physical activity. But doing some moderate- or vigorous-intensity aerobic activity can help reduce your risk for heart disease. Your healthcare provider can help you figure out what is best for you.    Choose 1 or more activities you enjoy.  Walking is one of the easiest things you can do. You can also try swimming, riding a bike, dancing, or taking an exercise class.    When to call your healthcare provider  Call your healthcare provider if you have any of these:     Chest pain or feel dizzy or lightheaded    Burning, tightness, pressure, or heaviness in your chest, neck, shoulders, back, or arms    Abnormal shortness of breath    More joint or muscle pain    A very fast or irregular heartbeat (palpitations)  [employer] last reviewed this educational content on 7/1/2019 2000-2021 [employer]. All rights reserved. This information is not intended as a substitute for professional medical care. Always follow your healthcare professional's instructions.        Activities of Daily Living    Your Health Risk Assessment indicates you have difficulties with activities of daily living such as housework, bathing, preparing meals, taking medication, etc. Please make a follow up appointment for us to address this issue in more detail.    Signs of  Hearing Loss      Hearing much better with one ear can be a sign of hearing loss.   Hearing loss is a problem shared by many people. In fact, it is one of the most common health problems, particularly as people age. Most people age 65 and older have some hearing loss. By age 80, almost everyone does. Hearing loss often occurs slowly over the years. So you may not realize your hearing has gotten worse.  Have your hearing checked  Call your healthcare provider if you:    Have to strain to hear normal conversation    Have to watch other people s faces very carefully to follow what they re saying    Need to ask people to repeat what they ve said    Often misunderstand what people are saying    Turn the volume of the television or radio up so high that others complain    Feel that people are mumbling when they re talking to you    Find that the effort to hear leaves you feeling tired and irritated    Notice, when using the phone, that you hear better with one ear than the other  [employer] last reviewed this educational content on 1/1/2020 2000-2021 [employer]. All rights reserved. This information is not intended as a substitute for professional medical care. Always follow your healthcare professional's instructions.          Urinary Incontinence (Male)    Urinary incontinence means not being able to control the release of urine from the bladder.   Causes  Common causes of urinary incontinence in men include:    Infection    Certain medicines    Aging    Poor pelvic muscle tone    Bladder spasms    Obesity    Trouble urinating and fully emptying the bladder (urinary retention)  Other things that can cause incontinence are:     Nervous system diseases    Diabetes    Sleep apnea    Urinary tract infections    Prostate surgery    Pelvic injury  Constipation and smoking have also been identified as risk factors.   Symptoms    Urge incontinence (overactive bladder). This is a sudden urge to urinate. It occurs  even though there may not be much urine in the bladder. The need to urinate often during the night is common. It's due to bladder spasms.    Stress incontinence. This is urine leakage that you can't control. It can occur with sneezing, coughing, and other actions that put stress on the bladder.    Treatment  Treatment depends on what is causing the condition. Bladder infections are treated with antibiotics. Urinary retention is treated with a bladder catheter.   Home care  Follow these guidelines when caring for yourself at home:    Don't have any foods and drinks that may irritate the bladder. This includes:  ? Chocolate  ? Alcohol  ? Caffeine  ? Carbonated drinks  ? Acidic fruits and juices    Limit fluids to 6 to 8 cups a day.    Lose weight if you are overweight. This will reduce your symptoms.    If advised, do regular pelvic muscle-strengthening exercises such as Kegel exercises.    If needed, wear absorbent pads to catch urine. Change the pads often. This is for good hygiene and to prevent skin and bladder infections.    Bathe daily for good hygiene.    If an antibiotic was prescribed to treat a bladder infection, take it until it's finished. Keep taking it even if you are feeling better. This is to make sure your infection has cleared.    If a catheter was left in place, keep bacteria from getting into the collection bag. Don't disconnect the catheter from the collection bag.    Use a leg band to secure the catheter drainage tube, so it does not pull on the catheter. Drain the collection bag when it becomes full. To do this, use the drain spout at the bottom of the bag. Don't disconnect the bag from the catheter.    Don't pull on or try to remove a catheter. The catheter must be removed by a healthcare provider.    If you smoke, stop. Ask your provider for help if you can't do this on your own.  Follow-up care  Follow up with your healthcare provider, or as advised.  When to get medical advice  Call your  healthcare provider right away if any of these occur:    Fever over 100.4 F (38 C), or as directed by your provider    Bladder pain or fullness    Belly swelling, nausea, or vomiting    Back pain    Weakness, dizziness, or fainting    If a catheter was left in place, return if:  ? The catheter falls out  ? The catheter stops draining for 6 hours  ? Your urine gets cloudy or smells bad  [employer] last reviewed this educational content on 1/1/2020 2000-2021 [employer]. All rights reserved. This information is not intended as a substitute for professional medical care. Always follow your healthcare professional's instructions.        Your Health Risk Assessment indicates you feel you are not in good emotional health.    Recreation   Recreation is not limited to sports and team events. It includes any activity that provides relaxation, interest, enjoyment, and exercise. Recreation provides an outlet for physical, mental, and social energy. It can give a sense of worth and achievement. It can help you stay healthy.    Mental Exercise and Social Involvement  Mental and emotional health is as important as physical health. Keep in touch with friends and family. Stay as active as possible. Continue to learn and challenge yourself.   Things you can do to stay mentally active are:    Learn something new, like a foreign language or musical instrument.     Play SCRABBLE or do crossword puzzles. If you cannot find people to play these games with you at home, you can play them with others on your computer through the Internet.     Join a games club--anything from card games to chess or checkers or lawn bowling.     Start a new hobby.     Go back to school.     Volunteer.     Read.   Keep up with world events.    Preventing Falls at Home  A person can fall for many reasons. Older adults may fall because reaction time slows down as we age. Your muscles and joints may get stiff, weak, or less flexible because of  illness, medicines, or a physical condition.   Other health problems that make falls more likely include:     Arthritis    Dizziness or lightheadedness when you stand up (orthostatic hypotension)    History of a stroke    Dizziness    Anemia    Certain medicines taken for mental illness or to control blood pressure.    Problems with balance or gait    Bladder or urinary problems    History of falling    Changes in vision (vision impairment)    Changes in thinking skills and memory (cognitive impairment)  Injuries from a fall can include serious injuries such as broken bones, dislocated joints, internal bleeding and cuts. Injuries like these can limit your independence.   Prevention tips  To help prevent falls and fall-related injuries, follow the tips below.    Floors  To make floors safer:     Put nonskid pads under area rugs.    Remove small rugs.    Replace worn floor coverings.    Tack carpets firmly to each step on carpeted stairs. Put nonskid strips on the edges of uncarpeted stairs.    Keep floors and stairs free of clutter and cords.    Arrange furniture so there are clear pathways.    Clean up any spills right away.  Bathrooms    To make bathrooms safer:     Install grab bars in the tub or shower.    Apply nonskid strips or put a nonskid rubber mat in the tub or shower.    Sit on a bath chair to bathe.    Use bathmats with nonskid backing.  Lighting  To improve visibility in your home:      Keep a flashlight in each room. Or put a lamp next to the bed within easy reach.    Put nightlights in the bedrooms, hallways, kitchen, and bathrooms.    Make sure all stairways have good lighting.    Take your time when going up and down stairs.    Put handrails on both sides of stairs and in walkways for more support. To prevent injury to your wrist or arm, don t use handrails to pull yourself up.    Install grab bars to pull yourself up.    Move or rearrange items that you use often. This will make them easier to find  or reach.    Look at your home to find any safety hazards. Especially look at doorways, walkways, and the driveway. Remove or repair any safety problems that you find.  Other changes to make    Look around to find any safety hazards. Look closely at doorways, walkways, and the driveway. Remove or repair any safety problems that you find.    Wear shoes that fit well.    Take your time when going up and down stairs.    Put handrails on both sides of stairs and in walkways for more support. To prevent injury to your wrist or arm, don t use handrails to pull yourself up.    Install grab bars wherever needed to pull yourself up.    Arrange items that you use often. This will make them easier to find or reach.    [employer] last reviewed this educational content on 3/1/2020    6551-9482 [employer]. All rights reserved. This information is not intended as a substitute for professional medical care. Always follow your healthcare professional's instructions.

## 2022-10-05 NOTE — PROGRESS NOTES
"SUBJECTIVE:   Parish is a 89 year old who presents for Preventive Visit.  Pt here with Niece and sister      Patient has been advised of split billing requirements and indicates understanding: Yes  Are you in the first 12 months of your Medicare coverage?  No    Healthy Habits:     In general, how would you rate your overall health?  Fair    Frequency of exercise:  None    Do you usually eat at least 4 servings of fruit and vegetables a day, include whole grains    & fiber and avoid regularly eating high fat or \"junk\" foods?  Yes    Taking medications regularly:  Yes    Medication side effects:  None    Ability to successfully perform activities of daily living:  Transportation requires assistance, shopping requires assistance, preparing meals requires assistance, housework requires assistance, bathing requires assistance, laundry requires assistance and money management requires assistance    Home Safety:  No safety concerns identified    Hearing Impairment:  Difficulty following a conversation in a noisy restaurant or crowded room, difficulty following dialogue in the theater, need to ask people to speak up or repeat themselves and difficulty understanding soft or whispered speech    In the past 6 months, have you been bothered by leaking of urine? Yes    In general, how would you rate your overall mental or emotional health?  Fair      PHQ-2 Total Score: 2    Additional concerns today:  Yes     Lives in  [facility] independent living  Per niece  1. We discovered that Parish had stopped showering or washing his hair from May through August. That s when we contacted [employer] for caregivers 12 hours a week. They started on Sept. 3.  2. Incontinence is a problem. He s ruined 3 chairs and a mattress. Waterproof mattress pads and chair pads are installed now. We ve ordered Depends, but he doesn t know how to use them.  3. His pants are always sliding down. Getting dressed, he can t put suspenders on, although he has " two new pair.  4. He s been diagnosed as underweight, and [facility] [employer] staff were concerned that he s not eating enough. He has supper delivered daily now.  5. It's a big effort for him to get up from a chair, and he has fallen twice when doing that, and skinned an arm. He s borrowing a walker, but hasn t been fitted for one. At his doctor appointment in August, was an order was placed for someone to fit him for a walker?  6. He s unsteady on his feet. Last week on a visit to the dentist, he fell and scraped his knee.  7. He walks very slowly, and can no longer walk to the grocery store next to his apartment building.  8. He has lower back pain, and could be taking Tylenol for it, but has not taken much.  9. He's been getting more confused lately.  10. He sometimes becomes upset when [employer] caregivers try to help, and sends them home early.     They have Home care which comes and helps with above  Pt gets Cooked meals and able to get The meals and Heat them in Microwave  Do you feel safe in your environment? Yes    Have you ever done Advance Care Planning? (For example, a Health Directive, POLST, or a discussion with a medical provider or your loved ones about your wishes): Yes, advance care planning is on file.     Niece and sister feel pt is able to manage with Home care and is safe  He gets Cooked Meals  He has Been Incontinent  Uses a walker   Falls when he does not use walker  Has a   Fall risk  Fallen 2 or more times in the past year?: Yes  Any fall with injury in the past year?: Yes    Cognitive Screening   1) Repeat 3 items (Leader, Season, Table)    2) Clock draw: NORMAL  3) 3 item recall: Recalls 1 object   Results: NORMAL clock, 1-2 items recalled: COGNITIVE IMPAIRMENT LESS LIKELY    Mini-CogTM Copyright S Anthony. Licensed by the author for use in [employer]; reprinted with permission (willard@.[facility]). All rights reserved.      Do you have sleep apnea, excessive snoring  or daytime drowsiness?: no    Reviewed and updated as needed this visit by clinical staff   Tobacco  Allergies  Meds                Reviewed and updated as needed this visit by Provider                   Social History     Tobacco Use     Smoking status: Former Smoker     Years: 15.00     Types: Cigarettes     Quit date: 1960     Years since quittin.8     Smokeless tobacco: Never Used   Substance Use Topics     Alcohol use: No     Alcohol Use 10/5/2022   Prescreen: >3 drinks/day or >7 drinks/week? Not Applicable   No flowsheet data found.  Hyperlipidemia Follow-Up      Are you regularly taking any medication or supplement to lower your cholesterol?   Yes- statin    Are you having muscle aches or other side effects that you think could be caused by your cholesterol lowering medication?  No    Hypertension Follow-up      Do you check your blood pressure regularly outside of the clinic? No     Are you following a low salt diet? Yes    Are your blood pressures ever more than 140 on the top number (systolic) OR more   than 90 on the bottom number (diastolic), for example 140/90? No    Pt has atrial fib and on arelto  Doing better with a walker and less  Falls  Will need to stop xarelto if falling   Dementia as above  Has home care  geyts upset with Home care at [facility]  For most able to heat meals in Microwave and Go to the bathroom    Current providers sharing in care for this patient include:   Patient Care Team:  Tameka Man MD as PCP - General (Family Medicine)  Pio Timmons MD as MD (Cardiovascular Disease)  Pio Timmons MD as Assigned Heart and Vascular Provider  Tameka Man MD as Assigned PCP  Sarah Teixeira as Community Health Worker (Primary Care - [facility])    The following health maintenance items are reviewed in Epic and correct as of today:  Health Maintenance   Topic Date Due     MICROALBUMIN  Never done     DTAP/TDAP/TD IMMUNIZATION (3 - Td or Tdap) 2021     COVID-19  Vaccine (4 - Booster for Moderna series) 2022     INFLUENZA VACCINE (1) 2022     BMP  10/04/2022     CBC W/DIFFERENTIAL  10/04/2022     TSH W/FREE T4 REFLEX  10/04/2022     HEMOGLOBIN  10/04/2022     ANNUAL REVIEW OF HM ORDERS  2023     MEDICARE ANNUAL WELLNESS VISIT  10/05/2023     FALL RISK ASSESSMENT  10/05/2023     DEXA  2024     ADVANCE CARE PLANNING  10/05/2027     PHQ-2 (once per calendar year)  Completed     Pneumococcal Vaccine: 65+ Years  Completed     URINALYSIS  Completed     ZOSTER IMMUNIZATION  Completed     IPV IMMUNIZATION  Aged Out     MENINGITIS IMMUNIZATION  Aged Out     HEPATITIS B IMMUNIZATION  Aged Out     LIPID  Discontinued     Lab work is in process  Labs reviewed in EPIC  BP Readings from Last 3 Encounters:   10/05/22 138/70   10/04/21 130/70   21 136/71    Wt Readings from Last 3 Encounters:   10/05/22 64.4 kg (142 lb)   10/04/21 66.7 kg (147 lb)   21 68.9 kg (152 lb)                  Patient Active Problem List   Diagnosis     Hypothyroidism     Essential hypertension with goal blood pressure less than 140/90     Hyperlipidemia with target LDL less than 130     History of prostate cancer     Hyponatremia     Edema     Right thigh pain     Chronic idiopathic constipation     Urinary urgency     Light chain disease (H)     CKD (chronic kidney disease) stage 3, GFR 30-59 ml/min     Protein-calorie malnutrition (H)     Vitamin D deficiency     Atrial fibrillation, chronic (H)     History of fall     Chronic back pain     Osteoporosis     Memory problem     Past Surgical History:   Procedure Laterality Date     CYSTECTOMY BRANCHIAL CLEFT  2015     CYSTOSCOPY  2003     TONSILLECTOMY      at age 12       Social History     Tobacco Use     Smoking status: Former Smoker     Years: 15.00     Types: Cigarettes     Quit date: 1960     Years since quittin.8     Smokeless tobacco: Never Used   Substance Use Topics     Alcohol use: No     Family History    Problem Relation Age of Onset     Hypertension Mother      Hypertension Father      Depression Father      Asthma Sister      Depression Sister      Diabetes Maternal Grandmother      Cancer Maternal Grandfather         Stomach     Heart Disease No family hx of          Current Outpatient Medications   Medication Sig Dispense Refill     amLODIPine (NORVASC) 5 MG tablet TAKE 1 TABLET DAILY 90 tablet 3     apixaban ANTICOAGULANT (ELIQUIS ANTICOAGULANT) 5 MG tablet Take 1 tablet (5 mg) by mouth 2 times daily 180 tablet 1     augmented betamethasone dipropionate (DIPROLENE-AF) 0.05 % external cream APPLY SPARINGLY TO AFFECTED AREA TWICE DAILY AS NEEDED.  DO NOT APPLY TO FACE.  Use for 14 days. 15 g 1     levothyroxine (SYNTHROID/LEVOTHROID) 125 MCG tablet TAKE 1 TABLET DAILY (REPLACES 112 MCG TABLET) 90 tablet 3     lisinopril (ZESTRIL) 40 MG tablet Take 1 tablet (40 mg) by mouth daily 90 tablet 3     metoprolol tartrate (LOPRESSOR) 25 MG tablet Take 1 tablet (25 mg) by mouth 2 times daily 180 tablet 3     vitamin D3 (CHOLECALCIFEROL) 2000 units (50 mcg) tablet Take 1 tablet (2,000 Units) by mouth daily       Allergies   Allergen Reactions     Mold      Nkda [No Known Drug Allergies]      Seasonal Allergies Other (See Comments)     Runny nose             Review of Systems   Constitutional: Positive for chills. Negative for fever.   HENT: Positive for hearing loss. Negative for congestion, ear pain and sore throat.    Eyes: Negative for pain and visual disturbance.   Respiratory: Negative for cough and shortness of breath.    Cardiovascular: Negative for chest pain, palpitations and peripheral edema.   Gastrointestinal: Negative for abdominal pain, constipation, diarrhea, heartburn, hematochezia and nausea.   Genitourinary: Positive for frequency and urgency. Negative for dysuria, genital sores, hematuria, impotence and penile discharge.   Musculoskeletal: Positive for arthralgias and myalgias. Negative for joint  "swelling.   Skin: Negative for rash.   Neurological: Negative for dizziness, weakness, headaches and paresthesias.   Psychiatric/Behavioral: Positive for mood changes. The patient is nervous/anxious.      CONSTITUTIONAL:no fever  INTEGUMENTARY/SKIN: NEGATIVE for worrisome rashes, moles or lesions  EYES: NEGATIVE for vision changes or irritation  ENT/MOUTH: NEGATIVE for ear, mouth and throat problems  RESP: NEGATIVE for significant cough or SOB  CV: NEGATIVE for chest pain, palpitations or peripheral edema  GI: NEGATIVE for nausea, abdominal pain, heartburn, or change in bowel habits   male :has Incontinence  MUSCULOSKELETAL:walks slowly  NEURO: dementia  ENDOCRINE: NEGATIVE for temperature intolerance, skin/hair changes  HEME: NEGATIVE for bleeding problems    OBJECTIVE:   /70   Pulse 70   Temp 98.8  F (37.1  C) (Temporal)   Ht 1.64 m (5' 4.57\")   Wt 64.4 kg (142 lb)   SpO2 96%   BMI 23.95 kg/m   Estimated body mass index is 23.95 kg/m  as calculated from the following:    Height as of this encounter: 1.64 m (5' 4.57\").    Weight as of this encounter: 64.4 kg (142 lb).  Physical Exam  GENERAL: no distress and elderly  EYES: Eyes grossly normal to inspection, PERRL and conjunctivae and sclerae normal  HENT: ear canals and TM's normal, nose and mouth without ulcers or lesions  NECK: no adenopathy, no asymmetry, masses, or scars and thyroid normal to palpation  RESP: lungs clear to auscultation - no rales, rhonchi or wheezes  CV: regular rate and rhythm, normal S1 S2, no S3 or S4, no murmur, click or rub, no peripheral edema and peripheral pulses strong  ABDOMEN: soft, nontender, no hepatosplenomegaly, no masses and bowel sounds normal  MS: no gross musculoskeletal defects noted, no edema  SKIN: no suspicious lesions or rashes  NEURO: hard of Hearing  Tries to answer questions  PSYCH: pleasant    Diagnostic Test Results:  Pending     ASSESSMENT / PLAN:   (Z00.00) Medicare annual wellness visit, subsequent " " (primary encounter diagnosis)  Comment:   Plan:   Refills done  Labs Ordered  Helped with Long term care paperwork    (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, unspecified dementia type  Comment: Home care papers    (E78.5) Hyperlipidemia with target LDL less than 130  Comment: stable       (E03.9) Acquired hypothyroidism  Comment: pending   Plan: TSH WITH FREE T4 REFLEX            (I48.20) Atrial fibrillation, chronic (H)  Comment: on xarelto  Plan: if he has any further falls-he will need to stop this    (I10) Essential hypertension with goal blood pressure less than 140/90  Comment: stable   Plan: Albumin Random Urine Quantitative with Creat         Ratio, BASIC METABOLIC PANEL            (M81.0) Osteoporosis, unspecified osteoporosis type, unspecified pathological fracture presence  Comment: stable   Plan: needs to prevent falls  Refer PT    (N18.31) Stage 3a chronic kidney disease (H)  Comment: pending   Plan: CBC with Platelets & Differential, Hemoglobin     Not on any NSAID    (Z85.46) History of prostate cancer  Comment:   Plan: has Mild Incontinence    (E03.9) Hypothyroidism, unspecified type  Comment: pending labs  Plan: levothyroxine (SYNTHROID/LEVOTHROID) 125 MCG         tablet            (R26.9) Gait abnormality  Comment: uses walker   Plan: Physical Therapy Referral        Will need to see PT to get this Properly fitted or  his own  Pt will follow up on covid Booster at pharmacy  Time spent talking to family, reviewing chart, refills , labs and documenting  COUNSELING:  Reviewed preventive health counseling, as reflected in patient instructions       Vision screening       Hearing screening       Bladder control       Fall risk prevention       Immunizations    Vaccinated for: Influenza          Estimated body mass index is 23.95 kg/m  as calculated from the following:    Height as of this encounter: 1.64 m (5' 4.57\").    Weight as of this encounter: 64.4 kg (142 lb).        He " reports that he quit smoking about 62 years ago. His smoking use included cigarettes. He quit after 15.00 years of use. He has never used smokeless tobacco.      Appropriate preventive services were discussed with this patient, including applicable screening as appropriate for cardiovascular disease, diabetes, osteopenia/osteoporosis, and glaucoma.  As appropriate for age/gender, discussed screening for colorectal cancer, prostate cancer, breast cancer, and cervical cancer. Checklist reviewing preventive services available has been given to the patient.    Reviewed patients plan of care and provided an AVS. The Complex Care Plan (for patients with higher acuity and needing more deliberate coordination of services) for Parish meets the Care Plan requirement. This Care Plan has been established and reviewed with the Patient.    Counseling Resources:  ATP IV Guidelines  Pooled Cohorts Equation Calculator  Breast Cancer Risk Calculator  Breast Cancer: Medication to Reduce Risk  FRAX Risk Assessment  ICSI Preventive Guidelines  Dietary Guidelines for Americans, 2010  Pin or Peg's MyPlate  ASA Prophylaxis  Lung CA Screening    Janett He MD  [facility]    Identified Health Risks:    The patient was provided with suggestions to help him develop a healthy physical lifestyle.  He is at risk for lack of exercise and has been provided with information to increase physical activity for the benefit of his well-being.  The patient reports that he has difficulty with activities of daily living. I have asked that the patient make a follow up appointment in 12  weeks where this issue will be further evaluated and addressed.  The patient was provided with written information regarding signs of hearing loss.  Information on urinary incontinence and treatment options given to patient.  The patient was provided with suggestions to help him develop a healthy emotional lifestyle.  He is at risk for falling and has been  provided with information to reduce the risk of falling at home.

## 2022-10-05 NOTE — LETTER
October 20, 2022    Parish Driscoll  [address] [place] [address]          Dear ,    We are writing to inform you of your test results.    FIT test is Positive.   This is a screen test for colon cancer   He needs further evaluation with a colonscopy.  Please get this ASAP.   Referral done.      Resulted Orders   Albumin Random Urine Quantitative with Creat Ratio   Result Value Ref Range    Creatinine Urine mg/dL 129 mg/dL    Albumin Urine mg/L 17 mg/L    Albumin Urine mg/g Cr 13.18 0.00 - 17.00 mg/g Cr   BASIC METABOLIC PANEL   Result Value Ref Range    Sodium 140 133 - 144 mmol/L    Potassium 3.9 3.4 - 5.3 mmol/L    Chloride 105 94 - 109 mmol/L    Carbon Dioxide (CO2) 28 20 - 32 mmol/L    Anion Gap 7 3 - 14 mmol/L    Urea Nitrogen 31 (H) 7 - 30 mg/dL    Creatinine 1.02 0.66 - 1.25 mg/dL    Calcium 9.1 8.5 - 10.1 mg/dL    Glucose 98 70 - 99 mg/dL    GFR Estimate 68 >60 mL/min/1.73m2      Comment:      Effective December 21, 2021 eGFRcr in adults is calculated using the 2021 CKD-EPI creatinine equation which includes age and gender (Giorgi et al., NEJ, DOI: 10.1056/RDCUzl2142411)   TSH WITH FREE T4 REFLEX   Result Value Ref Range    TSH 62.67 (H) 0.40 - 4.00 mU/L   CBC with platelets and differential   Result Value Ref Range    WBC Count 5.1 4.0 - 11.0 10e3/uL    RBC Count 4.01 (L) 4.40 - 5.90 10e6/uL    Hemoglobin 13.0 (L) 13.3 - 17.7 g/dL    Hematocrit 40.1 40.0 - 53.0 %     78 - 100 fL    MCH 32.4 26.5 - 33.0 pg    MCHC 32.4 31.5 - 36.5 g/dL    RDW 15.3 (H) 10.0 - 15.0 %    Platelet Count 208 150 - 450 10e3/uL    % Neutrophils 68 %    % Lymphocytes 18 %    % Monocytes 13 %    % Eosinophils 1 %    % Basophils 1 %    Absolute Neutrophils 3.4 1.6 - 8.3 10e3/uL    Absolute Lymphocytes 0.9 0.8 - 5.3 10e3/uL    Absolute Monocytes 0.6 0.0 - 1.3 10e3/uL    Absolute Eosinophils 0.1 0.0 - 0.7 10e3/uL    Absolute Basophils 0.0 0.0 - 0.2 10e3/uL   T4 free   Result Value Ref Range     Free T4 0.67 (L) 0.76 - 1.46 ng/dL       If you have any questions or concerns, please call the clinic at the number listed above.       Sincerely,      Janett He MD

## 2022-10-06 ENCOUNTER — PATIENT OUTREACH (OUTPATIENT)
Dept: CARE COORDINATION | Facility: CLINIC | Age: 87
End: 2022-10-06

## 2022-10-06 LAB
ANION GAP SERPL CALCULATED.3IONS-SCNC: 7 MMOL/L (ref 3–14)
BUN SERPL-MCNC: 31 MG/DL (ref 7–30)
CALCIUM SERPL-MCNC: 9.1 MG/DL (ref 8.5–10.1)
CHLORIDE BLD-SCNC: 105 MMOL/L (ref 94–109)
CO2 SERPL-SCNC: 28 MMOL/L (ref 20–32)
CREAT SERPL-MCNC: 1.02 MG/DL (ref 0.66–1.25)
CREAT UR-MCNC: 129 MG/DL
GFR SERPL CREATININE-BSD FRML MDRD: 68 ML/MIN/1.73M2
GLUCOSE BLD-MCNC: 98 MG/DL (ref 70–99)
MICROALBUMIN UR-MCNC: 17 MG/L
MICROALBUMIN/CREAT UR: 13.18 MG/G CR (ref 0–17)
POTASSIUM BLD-SCNC: 3.9 MMOL/L (ref 3.4–5.3)
SODIUM SERPL-SCNC: 140 MMOL/L (ref 133–144)
T4 FREE SERPL-MCNC: 0.67 NG/DL (ref 0.76–1.46)
TSH SERPL DL<=0.005 MIU/L-ACNC: 62.67 MU/L (ref 0.4–4)

## 2022-10-06 NOTE — PROGRESS NOTES
Patient's niece states that patient is living in independent senior living with [employer] coming out to assist with ALDs and housekeeping.   He is not ready to move to assisted living with the help he has coming in at this point. Declined [facility] at this time.

## 2022-10-07 DIAGNOSIS — E03.9 HYPOTHYROIDISM, UNSPECIFIED TYPE: Primary | ICD-10-CM

## 2022-10-14 RX ORDER — LEVOTHYROXINE SODIUM 125 UG/1
125 TABLET ORAL DAILY
Qty: 60 TABLET | Refills: 0 | Status: SHIPPED | OUTPATIENT
Start: 2022-10-14 | End: 2022-11-04 | Stop reason: DRUGHIGH

## 2022-10-14 RX ORDER — LEVOTHYROXINE SODIUM 137 UG/1
137 TABLET ORAL DAILY
Qty: 60 TABLET | Refills: 0 | Status: SHIPPED | OUTPATIENT
Start: 2022-10-14 | End: 2022-11-21

## 2022-10-14 NOTE — RESULT ENCOUNTER NOTE
Increase synthroid 125 mcg daily-stop the previous dose of 112 mcg-Your thyroid  test showed that That Thyroid Hormone is Low  Take synthroid consistently daily on a empty stomach  Follow up Thyroid check 6 weeks  Please make sure you had been taking synthroid consistently  daily on a empty   stomach before the testis done  Sincerely,  Janett He MD

## 2022-10-27 ENCOUNTER — TELEPHONE (OUTPATIENT)
Dept: FAMILY MEDICINE | Facility: CLINIC | Age: 87
End: 2022-10-27

## 2022-10-27 NOTE — TELEPHONE ENCOUNTER
"Patient's sister, Deanne, calling (signed consent to communicate PHI on file)  She visited patient yesterday and noticed an unopened letter from the clinic.  See letter dated 10/20/2022 and lab result notes 10/5/2022.    Per Janett Bernal's note-    \"Please call Pt  FIT test is Positive   This is a screen test for colon cancer  He needs further evaluation with a colonoscopy  Please get this ASAP  Referral done  Janett He MD\"    Corrinne stated that patient lives alone and the closest relative that helps him is her, both are in their 90's.  Per Corrinne, there is no way patient will be able to follow the colonoscopy prep instructions and she is wondering if it is necessary to complete at his age.  Unable to find any FIT results nor colonoscopy order.  Please clarify and advise    Sharri Ramirez RN  [facility] [place] [facility]    "

## 2022-11-20 DIAGNOSIS — E03.9 ACQUIRED HYPOTHYROIDISM: ICD-10-CM

## 2022-11-21 RX ORDER — LEVOTHYROXINE SODIUM 137 UG/1
137 TABLET ORAL DAILY
Qty: 90 TABLET | Refills: 0 | Status: SHIPPED | OUTPATIENT
Start: 2022-11-21 | End: 2022-12-16 | Stop reason: DRUGHIGH

## 2022-12-09 ENCOUNTER — NURSE TRIAGE (OUTPATIENT)
Dept: FAMILY MEDICINE | Facility: CLINIC | Age: 87
End: 2022-12-09

## 2022-12-09 NOTE — TELEPHONE ENCOUNTER
Spoke with patient's sister, Deanne. Consent to communicate on file.     Deanne calling regarding patient's symptoms that seem to becoming worse. Patient was diagnosed with atopic dermatitis in the past and prescribed Diprolene cream. Patient's eczema appears to be worsening, as it has been becoming more itchy and swollen. Cream appears to not be helping as much as it usually does.     Patient usually has home care that sees patient, and recommended that he be seen by provider due to worsening symptoms.    Eczema is mostly on left leg, however is also under arms.     Patient does not have pus, soft scabs, fever, or spreading redness.     Per protocol, patient should be seen within 3 days. Patient's sister requesting to have patient scheduled on 12/15, as patient also has a TSH lab that same day. Assisted with scheduling appointment on 12/15 at 3:00 pm.    Advised patient should be seen in UC/ED if symptoms worsen in the meantime. Patient's sister verbalized understanding and has no further questions at this time.     Marii Barnes RN  [facility]      Reason for Disposition    Localized severe itching after 2 days of steroid cream    Additional Information    Negative: Sounds like a life-threatening emergency to the triager    Negative: Dry skin that is widespread but not itchy (not eczema)    Negative: Widespread rash and doesn't match the symptoms of eczema    Negative: Localized rash and doesn't match the symptoms of eczema    Negative: Age < 12 weeks with fever 100.4 F (38.0 C) or higher rectally    Negative: Child sounds very sick or weak to the triager    Negative: Looks infected (spreading redness, pus, soft oozing scabs) with fever    Negative: Many small blisters or punched-out ulcers occur    Negative: Looks infected (spreading redness, pus, soft oozing scabs) without fever    Negative: Localized rash is very painful to touch    Negative: Fever that is unexplained    Negative:  Widespread severe itching after using steroid cream and oral Benadryl for over 24 hours    Negative: Question about eczema treatment program that triager unable to answer    Negative: Caller requesting a prescription refill    Protocols used: ECZEMA FOLLOW-UP CALL-P-[place]

## 2022-12-15 ENCOUNTER — OFFICE VISIT (OUTPATIENT)
Dept: FAMILY MEDICINE | Facility: CLINIC | Age: 87
End: 2022-12-15
Payer: COMMERCIAL

## 2022-12-15 ENCOUNTER — ANCILLARY PROCEDURE (OUTPATIENT)
Dept: ULTRASOUND IMAGING | Facility: CLINIC | Age: 87
End: 2022-12-15
Attending: STUDENT IN AN ORGANIZED HEALTH CARE EDUCATION/TRAINING PROGRAM
Payer: COMMERCIAL

## 2022-12-15 ENCOUNTER — TELEPHONE (OUTPATIENT)
Dept: VASCULAR SURGERY | Facility: CLINIC | Age: 87
End: 2022-12-15

## 2022-12-15 ENCOUNTER — LAB (OUTPATIENT)
Dept: LAB | Facility: CLINIC | Age: 87
End: 2022-12-15
Payer: COMMERCIAL

## 2022-12-15 VITALS
TEMPERATURE: 97.9 F | RESPIRATION RATE: 14 BRPM | BODY MASS INDEX: 25.33 KG/M2 | HEIGHT: 65 IN | DIASTOLIC BLOOD PRESSURE: 84 MMHG | SYSTOLIC BLOOD PRESSURE: 152 MMHG | OXYGEN SATURATION: 99 % | WEIGHT: 152 LBS | HEART RATE: 85 BPM

## 2022-12-15 DIAGNOSIS — I87.2 VENOUS STASIS DERMATITIS OF BOTH LOWER EXTREMITIES: ICD-10-CM

## 2022-12-15 DIAGNOSIS — E03.9 ACQUIRED HYPOTHYROIDISM: ICD-10-CM

## 2022-12-15 DIAGNOSIS — M79.89 LEFT LEG SWELLING: Primary | ICD-10-CM

## 2022-12-15 DIAGNOSIS — M79.89 LEFT LEG SWELLING: ICD-10-CM

## 2022-12-15 PROCEDURE — 93970 EXTREMITY STUDY: CPT | Mod: TC | Performed by: RADIOLOGY

## 2022-12-15 PROCEDURE — 99214 OFFICE O/P EST MOD 30 MIN: CPT | Performed by: STUDENT IN AN ORGANIZED HEALTH CARE EDUCATION/TRAINING PROGRAM

## 2022-12-15 PROCEDURE — 84443 ASSAY THYROID STIM HORMONE: CPT

## 2022-12-15 PROCEDURE — 36415 COLL VENOUS BLD VENIPUNCTURE: CPT

## 2022-12-15 PROCEDURE — 84439 ASSAY OF FREE THYROXINE: CPT

## 2022-12-15 NOTE — TELEPHONE ENCOUNTER
Talked w/niece.......NEED CONSENT TO COMM if talking to her again!!    She answered the phone as pt was in getting an US to r/o DVT;  Called to schedule appt with the pt.

## 2022-12-15 NOTE — PROGRESS NOTES
Assessment & Plan     (M79.89) Left leg swelling  (primary encounter diagnosis)  Comment: New onset, worsening.  Concern for possible DVT although patient is noted to be on anticoagulation with Eliquis physical exam remarkable for extreme swelling in left lower leg as well as tightness to skin and erythema noted.  Patient also with venous stasis of bilateral lower legs concern for DVT and will send patient urgently to brain imaging to perform bilateral duplex ultrasound to rule out.  Plan: US Lower Extremity Venous Duplex Bilateral        Pending    (I87.2) Venous stasis dermatitis of both lower extremities  Comment: Chronic, worsening.  Notable bilateral anterior venous stasis and lower extremities.  Likely correlated to poor circulation and will provide patient with vascular surgical referral with vein specialty.  Plan: Vascular Surgery Referral        Pending scheduling follow-up.                   No follow-ups on file.    WILMER DE LEÓN MD  [facility] is a 89 year old accompanied by his  Sister ( in the lobby)  presenting for the following health issues:  Derm Problem and Edema      HPI       Onset/Duration: 1 week   Description  Location: Left lower leg.   Character: Red, blotchy, and  swollen.   Itching:  Very itchy.    Intensity:  moderate  Progression of Symptoms:  worsening  Accompanying signs and symptoms:   Fever: No  Body aches or joint pain: Patient has a history of osteoporosis.   Sore throat symptoms: No  Recent cold symptoms: No  History:           Previous episodes of similar rash: None  New exposures:  None.   Recent travel: No  Therapies tried and outcome: Aveeno lotion     Rash  Patient reports that he has noticed a rashes in his lower legs as well. He states that his left leg has worsened with the associated swelling along with rashes. He states that he has also noticed changes in right lower leg as well. He endorses itchiness as well   He  "reports being concerned about his skin         Review of Systems   CONSTITUTIONAL: NEGATIVE for fever, chills, change in weight  INTEGUMENTARY/SKIN: POSITIVE for skin tightness and rash   ENT/MOUTH: NEGATIVE for ear, mouth and throat problems  RESP: NEGATIVE for significant cough or SOB  CV: NEGATIVE for chest pain, palpitations or peripheral edema      Objective    BP (!) 148/82   Pulse 85   Temp 97.9  F (36.6  C) (Temporal)   Resp 14   Ht 1.64 m (5' 4.57\")   Wt 68.9 kg (152 lb)   SpO2 99%   BMI 25.63 kg/m    Body mass index is 25.63 kg/m .  Physical Exam   GENERAL: healthy, alert and no distress  RESP: lungs clear to auscultation - no rales, rhonchi or wheezes  CV: regular rate and rhythm, normal S1 S2, no S3 or S4, no murmur, click or rub, no peripheral edema and peripheral pulses strong  MS: no gross musculoskeletal defects noted, no edema  SKIN: Left leg: erythematous, 4+ swelling in leg with notable venous status. Skin is extremely tight from top of tibia to ankle. Right leg: venous status prevalent     No results found for any visits on 12/15/22.                "

## 2022-12-16 ENCOUNTER — TELEPHONE (OUTPATIENT)
Dept: FAMILY MEDICINE | Facility: CLINIC | Age: 87
End: 2022-12-16

## 2022-12-16 DIAGNOSIS — R60.0 EDEMA OF LEFT LOWER EXTREMITY: Primary | ICD-10-CM

## 2022-12-16 DIAGNOSIS — E03.9 ACQUIRED HYPOTHYROIDISM: Primary | ICD-10-CM

## 2022-12-16 LAB
T4 FREE SERPL-MCNC: 0.86 NG/DL (ref 0.76–1.46)
TSH SERPL DL<=0.005 MIU/L-ACNC: 32.21 MU/L (ref 0.4–4)

## 2022-12-16 RX ORDER — LEVOTHYROXINE SODIUM 150 UG/1
150 TABLET ORAL DAILY
Qty: 90 TABLET | Refills: 3 | Status: SHIPPED | OUTPATIENT
Start: 2022-12-16 | End: 2022-12-29

## 2022-12-16 RX ORDER — TORSEMIDE 10 MG/1
10 TABLET ORAL DAILY
Qty: 20 TABLET | Refills: 0 | Status: SHIPPED | OUTPATIENT
Start: 2022-12-16

## 2022-12-16 NOTE — TELEPHONE ENCOUNTER
"Pt's sister, Corrinne calling. Consent to communicate is on file. Corrinne had further questions regarding vascular surgery referral. Explained that referral was placed for \"venous stasis dermatitis of both lower extremities\" and writer explained what this means. Sister wanted to know how soon he should be seen. Advised per referral he should be seen in 1-2 weeks.  is looking for a clinic close to [place] such as [place]. No locations near [place] within [place]. Sister does not want to drive to [facility] or [place]. She will check with patient's insurance and will see if there are more local options for the vascular specialist and let us know if anything is needed from our office.    She asked about pt's thyroid and reviewed results with her. She asked what the abnormal thyroid results could do to his body. Explained this can cause fatigue and low tolerance to cold, constipation, weight gain, etc. Medication is coming from the mail order pharmacy, so will plan to recheck TSH 6 weeks after he increases the dose.     Corrinne had no further questions or concerns.    Glenda Earl RN  [facility]    "

## 2022-12-16 NOTE — TELEPHONE ENCOUNTER
"Spoke with patient and gave PCP message:    \"Your thyroid dose needs to be increased. Be sure to take your medication on an empty stomach. A new prescription has been sent to your mail order pharmacy.  Return for recheck of these labs in 6 weeks.  Call [phone] or go to [email or web address] for a lab-only appointment.\"    Patient verbalized understanding and was scheduled for lab appointment for 1/19/22.    Nereida Flores RN  [facility]    "

## 2022-12-16 NOTE — RESULT ENCOUNTER NOTE
Please call patient:  Your thyroid dose needs to be increased. Be sure to take your medication on an empty stomach. A new prescription has been sent to your mail order pharmacy.  Return for recheck of these labs in 6 weeks.  Call [phone] or go to [email or web address] for a lab-only appointment.    Tameka Man MD

## 2022-12-19 DIAGNOSIS — I87.2 VENOUS STASIS DERMATITIS OF BOTH LOWER EXTREMITIES: Primary | ICD-10-CM

## 2022-12-20 NOTE — TELEPHONE ENCOUNTER
12-20-22 Spoke w/Mabel; pt is going to a vein clinic in [place] and Dr. Walters is aware of this.  sm

## 2022-12-28 ENCOUNTER — MYC MEDICAL ADVICE (OUTPATIENT)
Dept: FAMILY MEDICINE | Facility: CLINIC | Age: 87
End: 2022-12-28

## 2022-12-28 DIAGNOSIS — E03.9 ACQUIRED HYPOTHYROIDISM: ICD-10-CM

## 2022-12-29 RX ORDER — LEVOTHYROXINE SODIUM 150 UG/1
150 TABLET ORAL DAILY
Qty: 90 TABLET | Refills: 3 | Status: SHIPPED | OUTPATIENT
Start: 2022-12-29 | End: 2023-01-02

## 2023-01-01 ENCOUNTER — PATIENT OUTREACH (OUTPATIENT)
Dept: CARE COORDINATION | Facility: CLINIC | Age: 88
End: 2023-01-01
Payer: COMMERCIAL

## 2023-01-01 ENCOUNTER — HEALTH MAINTENANCE LETTER (OUTPATIENT)
Age: 88
End: 2023-01-01

## 2023-01-02 RX ORDER — LEVOTHYROXINE SODIUM 150 UG/1
150 TABLET ORAL DAILY
Qty: 90 TABLET | Refills: 3 | Status: SHIPPED | OUTPATIENT
Start: 2023-01-02

## 2023-01-10 ENCOUNTER — MEDICAL CORRESPONDENCE (OUTPATIENT)
Dept: HEALTH INFORMATION MANAGEMENT | Facility: CLINIC | Age: 88
End: 2023-01-10
Payer: COMMERCIAL

## 2023-01-12 ENCOUNTER — MYC MEDICAL ADVICE (OUTPATIENT)
Dept: FAMILY MEDICINE | Facility: CLINIC | Age: 88
End: 2023-01-12
Payer: COMMERCIAL

## 2023-01-19 ENCOUNTER — TELEPHONE (OUTPATIENT)
Dept: FAMILY MEDICINE | Facility: CLINIC | Age: 88
End: 2023-01-19

## 2023-01-19 NOTE — TELEPHONE ENCOUNTER
Reason for call:  Other   Patient called regarding (reason for call): call back  Additional comments: Patient sister called stating that the patient is requesting the doctor medical records on the patient.  needs to be sent to [facility]. Fax # [phone]  Ph# [phone]. ATTN: Briseyda   The records need to be sent as soon as possible for the patient to move into the senior assisted living facility.      Phone number to reach patient:  Other phone number:  [phone]    Best Time:  anytime    Can we leave a detailed message on this number?  YES    Travel screening: Not Applicable

## 2023-01-20 NOTE — TELEPHONE ENCOUNTER
Called to see what is needed Briseyda said they faxed a EMILIA with what they needed I told her did not receive please fax directly to me to [phone].   Manisha Zheng   [facility]

## 2023-01-30 ENCOUNTER — NURSE TRIAGE (OUTPATIENT)
Dept: NURSING | Facility: CLINIC | Age: 88
End: 2023-01-30
Payer: COMMERCIAL

## 2023-01-30 NOTE — TELEPHONE ENCOUNTER
Called sister (Corinne) and relayed message from Dr. Man. Notified that there are no other appointment openings today at [facility], offered to transfer to central scheduling if she would like them to check other [facility] clnics. She states that she cannot drive due to age and not sure how she would get him to urgent care. States that she is comfortable going to [place] [facility] as she is able to drive there. Advised to go to [place].    Miracle Angel RN   [facility]

## 2023-01-30 NOTE — TELEPHONE ENCOUNTER
Provider Response to 2nd Level Triage Request    I have reviewed the RN documentation. My recommendation is:  Face To Face Visit. Same Day: to be seen by another provider in same service line as I don't have capacity for an add on today

## 2023-01-30 NOTE — TELEPHONE ENCOUNTER
"Nurse Triage SBAR    Is this a 2nd Level Triage? YES, LICENSED PRACTITIONER REVIEW IS REQUIRED    Situation:   Caller is pt's sister (Corinne).  On Consent to Communicate.  Not currently with pt.  Rec'd call from an RN from [facility] who provide pca care for pt.  Corinne was told pt has severe swelling of lower L leg which now includes pus.    Now conference-calling pt directly.  Reached \"Jian\" -> pt's pca-caregiver.  Reports L Leg is \"swollen and has yellowish water coming out of it.\"  Malodorous.  Pt himself is extremely [place], therefore conducted triage together with pca.    Background:   Chronic lower leg edema  L leg somewhat moreso than R.    Assessment:   L Leg swelling acutely worse than usual.  Currently appears \"twice the size of R leg.\"  \"Swollen from the knee down.\"  \"Feels hard.\"  Leg has been \"watering for a long time, but not as disgusting as today (per pca's report).\"  Purulent drainage.    Protocol Recommended Disposition:   Go To ED/UCC Now (Or To Office With PCP Approval)   No ability to transport pt to an urgent care.  Pt's sister-Corinne (who is also in her 90's) would be able to drive pt to [facility] only.  No open appt slots at [facility] per checking with central scheduling.  Routed to provider for advice.  Can pt be squeezed into clinic today?    Does the patient meet one of the following criteria for ADS visit consideration? 16+ years old, with an [facility] PCP -> Yes    TIP  Providers, please consider if this condition is appropriate for management at one of our Acute and Diagnostic Services sites.     If patient is a good candidate, please use dotphrase <dot>triageresponse and select Refer to ADS to document.    Callback # for pt's sister (Corinne) -> [phone]   May call back to pt directly, however extremely [place].  (Best to call sister first, then conference-call pt who can hear his sister.)    Thank you-    Birgit [facility] Nurse Advisor     Reason for Disposition    SEVERE " swelling (e.g., swelling extends above knee, entire leg is swollen, weeping fluid)    Additional Information    Negative: Sounds like a life-threatening emergency to the triager    Negative: Chest pain    Negative: Small area of swelling and followed an insect bite to the area    Negative: Followed a knee injury    Negative: Ankle or foot injury    Negative: Pregnant with leg swelling or edema    Negative: Difficulty breathing at rest    Negative: Entire foot is cool or blue in comparison to other side    Protocols used: LEG SWELLING AND EDEMA-A-OH

## 2023-02-07 ENCOUNTER — MEDICAL CORRESPONDENCE (OUTPATIENT)
Dept: HEALTH INFORMATION MANAGEMENT | Facility: CLINIC | Age: 88
End: 2023-02-07

## 2023-02-13 ENCOUNTER — TELEPHONE (OUTPATIENT)
Dept: FAMILY MEDICINE | Facility: CLINIC | Age: 88
End: 2023-02-13
Payer: COMMERCIAL

## 2023-02-13 NOTE — TELEPHONE ENCOUNTER
Joselo Smith ([facility]- PT HC) and relayed verbal ok for requested orders per Dr. Man.    Danielle Ace, BSN RN  [facility]

## 2023-02-13 NOTE — TELEPHONE ENCOUNTER
The Home Care/Assisted Living/Nursing Facility is calling regarding an established patient.  Has the patient seen Home Care in the past or is currently residing in Assisted Living or Nursing Facility? No.     Sarah calling from [facility] requesting the following orders that are NOT within the Home Care, Assisted Living or Nursing Home Eval and Treatment standing order and must be ordered by a Licensed Practitioner.    Preferred Call Back Number: [phone], dvm ok    PT/OT/Speech Therapy  -Ok for home PT 2x week for 3 weeks, 1x per week for 3 weeks  -Ok for home OT evaluation    FYI to PCP- as result of cellulitis (recent hospitalizations), pt does have large scab area on left shin, Sarah states that she is not addressing this issue as patient has nurse monitoring all wound care from assisted living.    Sarah states that patient is working on establishing care with a provider that does rounds at his assisted living, but is hoping for orders through PCP short term to keep patient out of the hospital.    Routing to Licensed Practitioner (Provider) to review request and provide approval or recommendation.    Writer has verified [employer] will send fax to have orders signed.    MONICA Cosme RN  [facility]

## 2023-02-20 ENCOUNTER — MEDICAL CORRESPONDENCE (OUTPATIENT)
Dept: HEALTH INFORMATION MANAGEMENT | Facility: CLINIC | Age: 88
End: 2023-02-20

## 2023-02-20 ENCOUNTER — TELEPHONE (OUTPATIENT)
Dept: FAMILY MEDICINE | Facility: CLINIC | Age: 88
End: 2023-02-20
Payer: COMMERCIAL

## 2023-02-20 NOTE — TELEPHONE ENCOUNTER
The Home Care/Assisted Living/Nursing Facility is calling regarding an established patient.  Has the patient seen Home Care in the past or is currently residing in Assisted Living or Nursing Facility? Yes.     Priyanka calling from [facility]- OT requesting the following orders that are within the Home Care, Assisted Living or Nursing Home Eval and Treatment standing order and can be signed as standing order signature required by RN.    Preferred Call Back Number: [phone], dvm ok    PT/OT/Speech Therapy  - OT 1x week for 4 weeks for upper body strength cognitive testing, and ADLs    Any additional Orders:  Are there any orders requested, not stated above, that are outside of the standing order and must be routed to a licensed practitioner for approval?    No    Writer has verified [employer] will send fax to have orders signed.    MONICA Cosme RN  [facility]

## 2023-02-21 ENCOUNTER — MEDICAL CORRESPONDENCE (OUTPATIENT)
Dept: HEALTH INFORMATION MANAGEMENT | Facility: CLINIC | Age: 88
End: 2023-02-21

## 2023-02-21 ENCOUNTER — TELEPHONE (OUTPATIENT)
Dept: FAMILY MEDICINE | Facility: CLINIC | Age: 88
End: 2023-02-21
Payer: COMMERCIAL

## 2023-02-21 NOTE — TELEPHONE ENCOUNTER
Reason for Call:  Form, our goal is to have forms completed with 72 hours, however, some forms may require a visit or additional information.    Type of letter, form or note:  Home Health Certification    Who is the form from?: Home care    Where did the form come from: form was faxed in    What clinic location was the form placed at?: [facility]    Where the form was placed: Given to physician    What number is listed as a contact on the form?: [phone]  Tbe-[phone]    Call taken on 2/21/2023 at 1:45 PM by Manisha Zheng   [facility]

## 2024-01-01 ENCOUNTER — TRANSFERRED RECORDS (OUTPATIENT)
Dept: HEALTH INFORMATION MANAGEMENT | Facility: CLINIC | Age: 89
End: 2024-01-01
Payer: COMMERCIAL